# Patient Record
Sex: FEMALE | Race: WHITE
[De-identification: names, ages, dates, MRNs, and addresses within clinical notes are randomized per-mention and may not be internally consistent; named-entity substitution may affect disease eponyms.]

---

## 2017-03-30 ENCOUNTER — APPOINTMENT (OUTPATIENT)
Dept: GYNECOLOGIC ONCOLOGY | Facility: CLINIC | Age: 57
End: 2017-03-30

## 2017-03-30 VITALS
SYSTOLIC BLOOD PRESSURE: 120 MMHG | BODY MASS INDEX: 28.32 KG/M2 | DIASTOLIC BLOOD PRESSURE: 80 MMHG | WEIGHT: 170 LBS | HEIGHT: 65 IN

## 2017-03-30 DIAGNOSIS — M84.40XA PATHOLOGICAL FRACTURE, UNSPECIFIED SITE, INITIAL ENCOUNTER FOR FRACTURE: ICD-10-CM

## 2017-03-30 DIAGNOSIS — C53.9 MALIGNANT NEOPLASM OF CERVIX UTERI, UNSPECIFIED: ICD-10-CM

## 2017-03-30 DIAGNOSIS — C79.9 SECONDARY MALIGNANT NEOPLASM OF UNSPECIFIED SITE: ICD-10-CM

## 2017-03-30 DIAGNOSIS — C79.51 SECONDARY MALIGNANT NEOPLASM OF BONE: ICD-10-CM

## 2017-03-30 DIAGNOSIS — C80.1 SECONDARY MALIGNANT NEOPLASM OF UNSPECIFIED SITE: ICD-10-CM

## 2017-03-30 DIAGNOSIS — N88.8 OTHER SPECIFIED NONINFLAMMATORY DISORDERS OF CERVIX UTERI: ICD-10-CM

## 2017-04-07 ENCOUNTER — APPOINTMENT (OUTPATIENT)
Dept: ORTHOPEDIC SURGERY | Facility: CLINIC | Age: 57
End: 2017-04-07

## 2017-04-14 PROBLEM — M84.40XA PATHOLOGIC FRACTURE: Status: ACTIVE | Noted: 2017-04-14

## 2017-04-14 PROBLEM — C53.9 CERVICAL CANCER, FIGO STAGE IVB: Status: ACTIVE | Noted: 2017-04-14

## 2017-04-14 PROBLEM — C79.51 BONE METASTASIS: Status: ACTIVE | Noted: 2017-04-14

## 2017-04-23 ENCOUNTER — INPATIENT (INPATIENT)
Facility: HOSPITAL | Age: 57
LOS: 3 days | Discharge: HOME HEALTH | End: 2017-04-27
Attending: INTERNAL MEDICINE | Admitting: INTERNAL MEDICINE
Payer: COMMERCIAL

## 2017-04-23 VITALS — WEIGHT: 160.06 LBS

## 2017-04-23 LAB
ALBUMIN SERPL ELPH-MCNC: 2.4 G/DL — LOW (ref 3.3–5)
ALP SERPL-CCNC: 420 U/L — HIGH (ref 40–120)
ALT FLD-CCNC: 84 U/L — HIGH (ref 12–78)
ANION GAP SERPL CALC-SCNC: 13 MMOL/L — SIGNIFICANT CHANGE UP (ref 5–17)
APTT BLD: 32.5 SEC — SIGNIFICANT CHANGE UP (ref 27.5–37.4)
AST SERPL-CCNC: 203 U/L — HIGH (ref 15–37)
BILIRUB SERPL-MCNC: 2.5 MG/DL — HIGH (ref 0.2–1.2)
BLD GP AB SCN SERPL QL: SIGNIFICANT CHANGE UP
BUN SERPL-MCNC: 17 MG/DL — SIGNIFICANT CHANGE UP (ref 7–23)
CALCIUM SERPL-MCNC: 7.3 MG/DL — LOW (ref 8.5–10.1)
CHLORIDE SERPL-SCNC: 90 MMOL/L — LOW (ref 96–108)
CO2 SERPL-SCNC: 24 MMOL/L — SIGNIFICANT CHANGE UP (ref 22–31)
CREAT SERPL-MCNC: 0.73 MG/DL — SIGNIFICANT CHANGE UP (ref 0.5–1.3)
GLUCOSE SERPL-MCNC: 132 MG/DL — HIGH (ref 70–99)
INR BLD: 1.55 RATIO — HIGH (ref 0.88–1.16)
LACTATE SERPL-SCNC: 3.3 MMOL/L — HIGH (ref 0.7–2)
NT-PROBNP SERPL-SCNC: 1122 PG/ML — HIGH (ref 0–125)
POTASSIUM SERPL-MCNC: 3.7 MMOL/L — SIGNIFICANT CHANGE UP (ref 3.5–5.3)
POTASSIUM SERPL-SCNC: 3.7 MMOL/L — SIGNIFICANT CHANGE UP (ref 3.5–5.3)
PROT SERPL-MCNC: 6.7 GM/DL — SIGNIFICANT CHANGE UP (ref 6–8.3)
PROTHROM AB SERPL-ACNC: 16.9 SEC — HIGH (ref 9.8–12.7)
RAPID RVP RESULT: SIGNIFICANT CHANGE UP
SODIUM SERPL-SCNC: 127 MMOL/L — LOW (ref 135–145)
TROPONIN I SERPL-MCNC: <0.015 NG/ML — SIGNIFICANT CHANGE UP (ref 0.01–0.04)
TYPE + AB SCN PNL BLD: SIGNIFICANT CHANGE UP

## 2017-04-23 PROCEDURE — 93010 ELECTROCARDIOGRAM REPORT: CPT

## 2017-04-23 PROCEDURE — 76705 ECHO EXAM OF ABDOMEN: CPT | Mod: 26

## 2017-04-23 PROCEDURE — 99285 EMERGENCY DEPT VISIT HI MDM: CPT

## 2017-04-23 PROCEDURE — 71010: CPT | Mod: 26

## 2017-04-23 RX ORDER — PIPERACILLIN AND TAZOBACTAM 4; .5 G/20ML; G/20ML
3.38 INJECTION, POWDER, LYOPHILIZED, FOR SOLUTION INTRAVENOUS ONCE
Qty: 0 | Refills: 0 | Status: COMPLETED | OUTPATIENT
Start: 2017-04-23 | End: 2017-04-23

## 2017-04-23 RX ORDER — ONDANSETRON 8 MG/1
4 TABLET, FILM COATED ORAL ONCE
Qty: 0 | Refills: 0 | Status: COMPLETED | OUTPATIENT
Start: 2017-04-23 | End: 2017-04-23

## 2017-04-23 RX ORDER — VANCOMYCIN HCL 1 G
1000 VIAL (EA) INTRAVENOUS ONCE
Qty: 0 | Refills: 0 | Status: COMPLETED | OUTPATIENT
Start: 2017-04-23 | End: 2017-04-23

## 2017-04-23 RX ORDER — ACETAMINOPHEN 500 MG
650 TABLET ORAL ONCE
Qty: 0 | Refills: 0 | Status: COMPLETED | OUTPATIENT
Start: 2017-04-23 | End: 2017-04-23

## 2017-04-23 RX ORDER — DIPHENHYDRAMINE HCL 50 MG
52 CAPSULE ORAL ONCE
Qty: 0 | Refills: 0 | Status: DISCONTINUED | OUTPATIENT
Start: 2017-04-23 | End: 2017-04-23

## 2017-04-23 RX ORDER — DIPHENHYDRAMINE HCL 50 MG
50 CAPSULE ORAL ONCE
Qty: 0 | Refills: 0 | Status: COMPLETED | OUTPATIENT
Start: 2017-04-23 | End: 2017-04-23

## 2017-04-23 RX ORDER — SODIUM CHLORIDE 9 MG/ML
2000 INJECTION INTRAMUSCULAR; INTRAVENOUS; SUBCUTANEOUS ONCE
Qty: 0 | Refills: 0 | Status: COMPLETED | OUTPATIENT
Start: 2017-04-23 | End: 2017-04-23

## 2017-04-23 RX ADMIN — SODIUM CHLORIDE 2000 MILLILITER(S): 9 INJECTION INTRAMUSCULAR; INTRAVENOUS; SUBCUTANEOUS at 20:20

## 2017-04-23 RX ADMIN — PIPERACILLIN AND TAZOBACTAM 200 GRAM(S): 4; .5 INJECTION, POWDER, LYOPHILIZED, FOR SOLUTION INTRAVENOUS at 21:42

## 2017-04-23 RX ADMIN — Medication 50 MILLIGRAM(S): at 21:42

## 2017-04-23 RX ADMIN — Medication 250 MILLIGRAM(S): at 22:43

## 2017-04-23 RX ADMIN — Medication 650 MILLIGRAM(S): at 21:42

## 2017-04-23 NOTE — ED PROVIDER NOTE - DETAILS:
I, Maninder Pace, performed the initial face to face bedside interview with this patient regarding history of present illness, review of symptoms and relevant past medical, social and family history.  I completed an independent physical examination.  I was the initial provider who evaluated this patient.  The history, relevant review of systems, past medical and surgical history, medical decision making, and physical examination was documented by the scribe in my presence and I attest to the accuracy of the documentation.

## 2017-04-23 NOTE — ED STATDOCS - DETAILS:
I, Laurie Reed, performed the initial face to face bedside interview with this patient regarding history of present illness, review of symptoms and relevant past medical, social and family history.  I completed an independent physical examination.  I was the initial provider who evaluated this patient.  The history, relevant review of systems, past medical and surgical history, medical decision making, and physical examination was documented by the scribe in my presence and I attest to the accuracy of the documentation.

## 2017-04-23 NOTE — ED PROVIDER NOTE - CONSTITUTIONAL, MLM
normal... +Diaphoretic. Well appearing, well nourished, awake, alert, oriented to person, place, time/situation and in no apparent distress.

## 2017-04-23 NOTE — ED PROVIDER NOTE - NS ED MD SCRIBE ATTENDING SCRIBE SECTIONS
RESULTS/DISPOSITION/PAST MEDICAL/SURGICAL/SOCIAL HISTORY/HISTORY OF PRESENT ILLNESS/REVIEW OF SYSTEMS/PHYSICAL EXAM/PROGRESS NOTE

## 2017-04-23 NOTE — ED STATDOCS - PROGRESS NOTE DETAILS
57 y/o F PMHx PMD Dr. Ochoa, Onco Dr. Coombs cervical ca with mets, presents to the ED c/o abd pain. Pt's family provides that the pt has been having upper abd pain, decreased intake p/o, increased weakness, and SOB. +BM today +N. Further eval in Main ED. Please note that orders have been placed by Dr. Reed

## 2017-04-23 NOTE — ED PROVIDER NOTE - PROGRESS NOTE DETAILS
CT results reviewed and case DW Dr. Conway (sx) who will see and evaluate pt. Dr Conway had seen pt, NGT ordered and pt will be followed by Sx but admitted to the medicine service. Case DW Dr. Coleman and pt will be admitted to hostpitalist.

## 2017-04-23 NOTE — ED PROVIDER NOTE - CARE PLAN
Principal Discharge DX:	Sepsis  Secondary Diagnosis:	Cervical ca  Secondary Diagnosis:	Partial small bowel obstruction

## 2017-04-23 NOTE — ED ADULT NURSE NOTE - OBJECTIVE STATEMENT
pt brought to Intake 4, A&Ox3, skin w/d/i, PMHx cervical ca with mets, port noted to R chest wall, presents to the ED c/o abd pain, having upper abd pain, decreased intake p/o, increased weakness, and SOB. +BM today described as normal, + nausea w/ no episodes of emesis, found to be 93%SPO2 on arrival, placed on NC 3L/min, Sl placed, labs sent, fluid as per orders, report to Pattie in main 6.

## 2017-04-23 NOTE — ED PROVIDER NOTE - OBJECTIVE STATEMENT
55 y/o F PMHx cervical ca with mets presents to the ED c/o abd pain. Pt's family provides that the pt has been having upper abd pain, n/v/d, decrease PO intake, and increased weakness. Currently pt has no other complaints and denies CP, fever, and chills. PMD= Don. Oncologist= Corin.

## 2017-04-24 DIAGNOSIS — R16.0 HEPATOMEGALY, NOT ELSEWHERE CLASSIFIED: ICD-10-CM

## 2017-04-24 DIAGNOSIS — Z98.890 OTHER SPECIFIED POSTPROCEDURAL STATES: Chronic | ICD-10-CM

## 2017-04-24 DIAGNOSIS — C53.1 MALIGNANT NEOPLASM OF EXOCERVIX: ICD-10-CM

## 2017-04-24 DIAGNOSIS — K56.69 OTHER INTESTINAL OBSTRUCTION: ICD-10-CM

## 2017-04-24 LAB
APPEARANCE UR: (no result)
BACTERIA # UR AUTO: (no result)
BILIRUB UR-MCNC: (no result)
COLOR SPEC: (no result)
COMMENT - URINE: SIGNIFICANT CHANGE UP
DIFF PNL FLD: (no result)
EPI CELLS # UR: SIGNIFICANT CHANGE UP
GLUCOSE UR QL: NEGATIVE MG/DL — SIGNIFICANT CHANGE UP
KETONES UR-MCNC: (no result)
LACTATE SERPL-SCNC: 2 MMOL/L — SIGNIFICANT CHANGE UP (ref 0.7–2)
LEUKOCYTE ESTERASE UR-ACNC: (no result)
NITRITE UR-MCNC: NEGATIVE — SIGNIFICANT CHANGE UP
PH UR: 5 — SIGNIFICANT CHANGE UP (ref 5–8)
PROT UR-MCNC: 30 MG/DL
RBC CASTS # UR COMP ASSIST: (no result) /HPF (ref 0–4)
SP GR SPEC: 1.01 — SIGNIFICANT CHANGE UP (ref 1.01–1.02)
TROPONIN I SERPL-MCNC: <0.015 NG/ML — SIGNIFICANT CHANGE UP (ref 0.01–0.04)
TROPONIN I SERPL-MCNC: <0.015 NG/ML — SIGNIFICANT CHANGE UP (ref 0.01–0.04)
UROBILINOGEN FLD QL: 8 MG/DL
WBC UR QL: (no result)

## 2017-04-24 PROCEDURE — 74183 MRI ABD W/O CNTR FLWD CNTR: CPT | Mod: 26

## 2017-04-24 PROCEDURE — 74176 CT ABD & PELVIS W/O CONTRAST: CPT | Mod: 26

## 2017-04-24 PROCEDURE — 74022 RADEX COMPL AQT ABD SERIES: CPT | Mod: 26

## 2017-04-24 RX ORDER — SODIUM CHLORIDE 9 MG/ML
1000 INJECTION, SOLUTION INTRAVENOUS
Qty: 0 | Refills: 0 | Status: DISCONTINUED | OUTPATIENT
Start: 2017-04-24 | End: 2017-04-26

## 2017-04-24 RX ORDER — ACETAMINOPHEN 500 MG
650 TABLET ORAL EVERY 6 HOURS
Qty: 0 | Refills: 0 | Status: DISCONTINUED | OUTPATIENT
Start: 2017-04-24 | End: 2017-04-27

## 2017-04-24 RX ORDER — PIPERACILLIN AND TAZOBACTAM 4; .5 G/20ML; G/20ML
3.38 INJECTION, POWDER, LYOPHILIZED, FOR SOLUTION INTRAVENOUS EVERY 8 HOURS
Qty: 0 | Refills: 0 | Status: DISCONTINUED | OUTPATIENT
Start: 2017-04-24 | End: 2017-04-25

## 2017-04-24 RX ORDER — MORPHINE SULFATE 50 MG/1
2 CAPSULE, EXTENDED RELEASE ORAL EVERY 4 HOURS
Qty: 0 | Refills: 0 | Status: DISCONTINUED | OUTPATIENT
Start: 2017-04-24 | End: 2017-04-27

## 2017-04-24 RX ORDER — ACETAMINOPHEN 500 MG
650 TABLET ORAL ONCE
Qty: 0 | Refills: 0 | Status: COMPLETED | OUTPATIENT
Start: 2017-04-24 | End: 2017-04-24

## 2017-04-24 RX ORDER — ONDANSETRON 8 MG/1
4 TABLET, FILM COATED ORAL EVERY 4 HOURS
Qty: 0 | Refills: 0 | Status: DISCONTINUED | OUTPATIENT
Start: 2017-04-24 | End: 2017-04-24

## 2017-04-24 RX ORDER — SODIUM CHLORIDE 9 MG/ML
1000 INJECTION INTRAMUSCULAR; INTRAVENOUS; SUBCUTANEOUS ONCE
Qty: 0 | Refills: 0 | Status: COMPLETED | OUTPATIENT
Start: 2017-04-24 | End: 2017-04-24

## 2017-04-24 RX ORDER — DEXAMETHASONE 0.5 MG/5ML
4 ELIXIR ORAL DAILY
Qty: 0 | Refills: 0 | Status: DISCONTINUED | OUTPATIENT
Start: 2017-04-24 | End: 2017-04-27

## 2017-04-24 RX ORDER — ONDANSETRON 8 MG/1
4 TABLET, FILM COATED ORAL EVERY 4 HOURS
Qty: 0 | Refills: 0 | Status: DISCONTINUED | OUTPATIENT
Start: 2017-04-24 | End: 2017-04-27

## 2017-04-24 RX ORDER — MORPHINE SULFATE 50 MG/1
4 CAPSULE, EXTENDED RELEASE ORAL EVERY 4 HOURS
Qty: 0 | Refills: 0 | Status: DISCONTINUED | OUTPATIENT
Start: 2017-04-24 | End: 2017-04-27

## 2017-04-24 RX ADMIN — ONDANSETRON 4 MILLIGRAM(S): 8 TABLET, FILM COATED ORAL at 02:00

## 2017-04-24 RX ADMIN — Medication 4 MILLIGRAM(S): at 19:02

## 2017-04-24 RX ADMIN — ONDANSETRON 4 MILLIGRAM(S): 8 TABLET, FILM COATED ORAL at 19:48

## 2017-04-24 RX ADMIN — SODIUM CHLORIDE 75 MILLILITER(S): 9 INJECTION, SOLUTION INTRAVENOUS at 09:28

## 2017-04-24 RX ADMIN — PIPERACILLIN AND TAZOBACTAM 25 GRAM(S): 4; .5 INJECTION, POWDER, LYOPHILIZED, FOR SOLUTION INTRAVENOUS at 09:32

## 2017-04-24 RX ADMIN — SODIUM CHLORIDE 1000 MILLILITER(S): 9 INJECTION INTRAMUSCULAR; INTRAVENOUS; SUBCUTANEOUS at 05:38

## 2017-04-24 RX ADMIN — PIPERACILLIN AND TAZOBACTAM 25 GRAM(S): 4; .5 INJECTION, POWDER, LYOPHILIZED, FOR SOLUTION INTRAVENOUS at 16:40

## 2017-04-24 RX ADMIN — MORPHINE SULFATE 4 MILLIGRAM(S): 50 CAPSULE, EXTENDED RELEASE ORAL at 15:17

## 2017-04-24 RX ADMIN — MORPHINE SULFATE 4 MILLIGRAM(S): 50 CAPSULE, EXTENDED RELEASE ORAL at 15:30

## 2017-04-24 RX ADMIN — ONDANSETRON 4 MILLIGRAM(S): 8 TABLET, FILM COATED ORAL at 13:20

## 2017-04-24 RX ADMIN — PIPERACILLIN AND TAZOBACTAM 25 GRAM(S): 4; .5 INJECTION, POWDER, LYOPHILIZED, FOR SOLUTION INTRAVENOUS at 23:12

## 2017-04-24 RX ADMIN — Medication 650 MILLIGRAM(S): at 05:04

## 2017-04-24 NOTE — CONSULT NOTE ADULT - PROBLEM SELECTOR RECOMMENDATION 9
with likely disease progression in the bones and possibly in the left lobe of the liver  chemo options are limited  pemetrexed or off label use of immunotherapy can be considered if she improves.

## 2017-04-24 NOTE — CONSULT NOTE ADULT - ASSESSMENT
55 y/o female with h/o metastatic cervical cancer with extensive bone mets (diagnosed in 8/16) s/p surgery for spinal stabilization s/p XRT and chemotherapy (cisplatin). She has a large cervical/uterine mass obstructing the ureters. She was admitted on 4/23 for worsening abdominal pain, nausea and vomiting. Workup showed SBO and fever ro 10.7F. She was given zosyn IV.    1. Febrile syndrome. ?cause ?sepsis ?tumor fever. cervical Ca with extensive bone mets and likely liver mets.  -obtain BC x 2  -agree with zosyn 3.375 gm IV q8h for now  -reason for abx use and side effects reviewed with patient; monitor BMP  -monitor temps  -f/u CBC  -prognosis is poor  -supportive care  2. Other issues: care per medicine and oncology

## 2017-04-24 NOTE — CONSULT NOTE ADULT - SUBJECTIVE AND OBJECTIVE BOX
Patient is a 56y old  Female who presents with a chief complaint of abdominal pain, N/V    HPI:  55 y/o female with h/o metastatic cervical cancer with extensive bone mets (diagnosed in 8/16) s/p surgery for spinal stabilization s/p XRT and chemotherapy (cisplatin). She has a large cervical/uterine mass obstructing the ureters. She was admitted on 4/23 for worsening abdominal pain, nausea and vomiting. Workup showed SBO and fever ro 10.7F. She was given zosyn IV.      PAST MEDICAL & SURGICAL HISTORY:  cervical cancer as above with bone mets  lumbar spine stabilization for pathological fracture  mediport  Hernia repair  abdominal wall defect, congenital.  Meds: per reconciliation sheet, noted below  MEDICATIONS  (STANDING):  piperacillin/tazobactam IVPB. 3.375Gram(s) IV Intermittent every 8 hours  dextrose 5% + sodium chloride 0.45%. 1000milliLiter(s) IV Continuous <Continuous>    MEDICATIONS  (PRN):  morphine  - Injectable 2milliGRAM(s) IV Push every 4 hours PRN Moderate Pain (4 - 6)  morphine  - Injectable 4milliGRAM(s) IV Push every 4 hours PRN Severe Pain (7 - 10)  ondansetron    Tablet 4milliGRAM(s) Oral every 4 hours PRN Nausea and/or Vomiting  acetaminophen   Tablet 650milliGRAM(s) Oral every 6 hours PRN For Temp greater than 38 C (100.4 F)  acetaminophen   Tablet. 650milliGRAM(s) Oral every 6 hours PRN Mild Pain (1 - 3)    Allergies    No Known Allergies    Intolerances      Social: no smoking, no alcohol, no illegal drugs; no recent travel, no exposure to TB  FAMILY HISTORY:    ROS: the patient denies fever, no chills, no HA, no dizziness, no sore throat, no blurry vision, no CP, no palpitations, no SOB, no cough, no abdominal pain, no diarrhea, no N/V, no dysuria, no leg pain, no claudication, no rash, no joint aches, no rectal pain or bleeding, no night sweats    Vital Signs Last 24 Hrs  T(C): 36.4, Max: 38.7 (04-23 @ 21:00)  T(F): 97.6, Max: 101.7 (04-23 @ 21:00)  HR: 86 (86 - 117)  BP: 104/61 (104/61 - 123/72)  BP(mean): --  RR: 20 (16 - 20)  SpO2: 98% (93% - 100%)  Daily     Daily     PE:    Constitutional: frail looking  HEENT: NC/AT, EOMI, PERRLA  Neck: supple  Back: no tenderness  Respiratory: clear  Cardiovascular: S1S2 regular, no murmurs  Abdomen: soft, not tender, not distended, positive BS  Genitourinary: deferred  Rectal: deferred  Musculoskeletal: no muscle tenderness, no joint swelling or tenderness  Extremities: no pedal edema  Neurological: AxOx3, moving all extremities, no focal deficits  Skin: no rashes    Labs:                        11.4   10.9  )-----------( 160      ( 23 Apr 2017 21:13 )             32.9     04-23    127<L>  |  90<L>  |  17  ----------------------------<  132<H>  3.7   |  24  |  0.73    Ca    7.3<L>      23 Apr 2017 20:13    TPro  6.7  /  Alb  2.4<L>  /  TBili  2.5<H>  /  DBili  x   /  AST  203<H>  /  ALT  84<H>  /  AlkPhos  420<H>  04-23     LIVER FUNCTIONS - ( 23 Apr 2017 20:13 )  Alb: 2.4 g/dL / Pro: 6.7 gm/dL / ALK PHOS: 420 U/L / ALT: 84 U/L / AST: 203 U/L / GGT: x                 Radiology:    CT abdomen and pelvis:  Stomach and proximal small bowel loops are decompressed.  There are   dilated loops of mid small bowel measuring up to 4.5 cm multiple   air-fluid levels.  The distal small bowel is collapsed.  The colon is   underdistended.  Findings are suspicious for an early or low-grade bowel   obstruction.  Discrete transition point is not visualized.  The patient   would likely benefit from nasogastric tube placement.  If symptoms   persist or worsen, a follow-up CT with oral contrast can be performed.    There are two ventral abdominal wall hernias with nonobstructed distal   small bowel loop seen inside the rectus sheath; this does not contribute   to the patient's bowel obstruction.       Interval progression of masslike hypertrophy of the left hepatic lobe   which now measures 9 x 19 x 19 cm compared to 8 x14 x 16.5 cm   08/02/2016.  Underlying cirrhosis should be considered.  MRI can be   performed to exclude hepatocellular carcinoma.  Liver biopsy can be   performed as clinically warranted.    Progression of osseous metastatic disease throughout the visualized   spine, ribs and bony pelvis since 08/02/2014.    Advanced directives addressed: full resuscitation Patient is a 56y old  Female who presents with a chief complaint of abdominal pain, N/V    HPI:  55 y/o female with h/o omphalocele, metastatic cervical cancer with extensive bone mets (diagnosed in 8/16) s/p surgery for spinal stabilization s/p XRT and chemotherapy (cisplatin). She has a large cervical/uterine mass obstructing the ureters. She was admitted on 4/23 for worsening abdominal pain, nausea and vomiting. Workup showed SBO and fever ro 10.7F. She was given zosyn IV.      PAST MEDICAL & SURGICAL HISTORY:  cervical cancer as above with bone mets  lumbar spine stabilization for pathological fracture  mediport  Hernia repair  abdominal wall defect, congenital.  Meds: per reconciliation sheet, noted below  MEDICATIONS  (STANDING):  piperacillin/tazobactam IVPB. 3.375Gram(s) IV Intermittent every 8 hours  dextrose 5% + sodium chloride 0.45%. 1000milliLiter(s) IV Continuous <Continuous>    MEDICATIONS  (PRN):  morphine  - Injectable 2milliGRAM(s) IV Push every 4 hours PRN Moderate Pain (4 - 6)  morphine  - Injectable 4milliGRAM(s) IV Push every 4 hours PRN Severe Pain (7 - 10)  ondansetron    Tablet 4milliGRAM(s) Oral every 4 hours PRN Nausea and/or Vomiting  acetaminophen   Tablet 650milliGRAM(s) Oral every 6 hours PRN For Temp greater than 38 C (100.4 F)  acetaminophen   Tablet. 650milliGRAM(s) Oral every 6 hours PRN Mild Pain (1 - 3)    Allergies    No Known Allergies    Intolerances      Social: no smoking, no alcohol, no illegal drugs; no recent travel, no exposure to TB  FAMILY HISTORY:    ROS: the patient denies fever, no chills, no HA, no dizziness, no sore throat, no blurry vision, no CP, no palpitations, no SOB, no cough, no abdominal pain, no diarrhea, no N/V, no dysuria, no leg pain, no claudication, no rash, no joint aches, no rectal pain or bleeding, no night sweats    Vital Signs Last 24 Hrs  T(C): 36.4, Max: 38.7 (04-23 @ 21:00)  T(F): 97.6, Max: 101.7 (04-23 @ 21:00)  HR: 86 (86 - 117)  BP: 104/61 (104/61 - 123/72)  BP(mean): --  RR: 20 (16 - 20)  SpO2: 98% (93% - 100%)  Daily     Daily     PE:    Constitutional: frail looking  HEENT: NC/AT, EOMI, PERRLA  Neck: supple  Back: no tenderness  Respiratory: clear  Cardiovascular: S1S2 regular, no murmurs  Abdomen: soft, not tender, not distended, positive BS  Genitourinary: deferred  Rectal: deferred  Musculoskeletal: no muscle tenderness, no joint swelling or tenderness  Extremities: no pedal edema  Neurological: AxOx3, moving all extremities, no focal deficits  Skin: no rashes    Labs:                        11.4   10.9  )-----------( 160      ( 23 Apr 2017 21:13 )             32.9     04-23    127<L>  |  90<L>  |  17  ----------------------------<  132<H>  3.7   |  24  |  0.73    Ca    7.3<L>      23 Apr 2017 20:13    TPro  6.7  /  Alb  2.4<L>  /  TBili  2.5<H>  /  DBili  x   /  AST  203<H>  /  ALT  84<H>  /  AlkPhos  420<H>  04-23     LIVER FUNCTIONS - ( 23 Apr 2017 20:13 )  Alb: 2.4 g/dL / Pro: 6.7 gm/dL / ALK PHOS: 420 U/L / ALT: 84 U/L / AST: 203 U/L / GGT: x                 Radiology:    CT abdomen and pelvis:  Stomach and proximal small bowel loops are decompressed.  There are   dilated loops of mid small bowel measuring up to 4.5 cm multiple   air-fluid levels.  The distal small bowel is collapsed.  The colon is   underdistended.  Findings are suspicious for an early or low-grade bowel   obstruction.  Discrete transition point is not visualized.  The patient   would likely benefit from nasogastric tube placement.  If symptoms   persist or worsen, a follow-up CT with oral contrast can be performed.    There are two ventral abdominal wall hernias with nonobstructed distal   small bowel loop seen inside the rectus sheath; this does not contribute   to the patient's bowel obstruction.       Interval progression of masslike hypertrophy of the left hepatic lobe   which now measures 9 x 19 x 19 cm compared to 8 x14 x 16.5 cm   08/02/2016.  Underlying cirrhosis should be considered.  MRI can be   performed to exclude hepatocellular carcinoma.  Liver biopsy can be   performed as clinically warranted.    Progression of osseous metastatic disease throughout the visualized   spine, ribs and bony pelvis since 08/02/2014.    Advanced directives addressed: full resuscitation

## 2017-04-24 NOTE — CONSULT NOTE ADULT - ASSESSMENT
56 y old female with cervical CA, S/p chemoradiation, with abdominal pain, nausea vomiting, fever, passing gas, had a BM in ER, Ct evidence of  possible early SBO    IV hydration  serial abdominal exam  NGT  fever work up, medical evaluation  DVT /GI prophylaxis  Oncological evaluation  Pt is known to DR. Alfaro will inform him in AM

## 2017-04-24 NOTE — PATIENT PROFILE ADULT. - VISION (WITH CORRECTIVE LENSES IF THE PATIENT USUALLY WEARS THEM):
for distance/Partially impaired: cannot see medication labels or newsprint, but can see obstacles in path, and the surrounding layout; can count fingers at arm's length

## 2017-04-24 NOTE — CHART NOTE - NSCHARTNOTEFT_GEN_A_CORE
Hospitalist Update Note    Case discussed via phone with Dr. Huang regarding patient. Patient recently seen outpatient in her office to discuss progression of disease. Per Gyn Onc surgeon, there is no surgical plans from her standpoint. Case will be managed by medical oncology and general surgery with regards to SBO. Any potential surgery by general surgery would be moreso palliative given extensive disease.     - Will continue care via NGT and serial abdominal exams with Dr. Gonzalez on board.   - Palliative care discussions for GOC underway.

## 2017-04-24 NOTE — CONSULT NOTE ADULT - SUBJECTIVE AND OBJECTIVE BOX
Patient is a 56y old  Female who presents with a chief complaint of abdominal pain,fever    HPI: 67 Y old female with H/O cervical CA, S/P chemoradiation last in January 2017, has chronic abdominal pain, off a on worse since yesterday, diffuse associated with nausea and vomiting. febrile in the ER, no cough , congestion, no urinary complaint had a loose BM in ER, passing gas.    ROS: as above    PAST MEDICAL & SURGICAL HISTORY:  Congenital abdominal wall defect, S/P surgeries  Cervical CA, chemo radiation  Thoracic spine surgery    Allergies    No Known Allergies    FH: NC  Social H: Quit smoking in August 2016, Drinks weekly.    Medication Zofran, laxative    Vital Signs Last 24 Hrs  T(C): 36.6, Max: 38.7 (04-23 @ 21:00)  T(F): 97.9, Max: 101.7 (04-23 @ 21:00)  HR: 98 (98 - 117)  BP: 120/76 (111/68 - 123/72)  BP(mean): --  RR: 17 (16 - 18)  SpO2: 100% (93% - 100%)      PHYSICAL EXAM:      Constitutional: NAD    General:  AOx3    Respiratory:  CTABL    Cardiovascular: S1+S2+0    Gastrointestinal : Abdomen soft,  distended, Mid and left  abdominal tenderness no rebound or guarding, palpable liver. healed mid line scars.      Extremities: NV intact    Neurological: No focal deficit.    Labs:                          11.4   10.9  )-----------( 160      ( 23 Apr 2017 21:13 )             32.9       04-23    127<L>  |  90<L>  |  17  ----------------------------<  132<H>  3.7   |  24  |  0.73    Ca    7.3<L>      23 Apr 2017 20:13    TPro  6.7  /  Alb  2.4<L>  /  TBili  2.5<H>  /  DBili  x   /  AST  203<H>  /  ALT  84<H>  /  AlkPhos  420<H>  04-23      PT/INR - ( 23 Apr 2017 20:13 )   PT: 16.9 sec;   INR: 1.55 ratio         PTT - ( 23 Apr 2017 20:13 )  PTT:32.5 sec    S lactate: 3.3-2    IMPRESSION:  Stomach and proximal small bowel loops are decompressed.  There are   dilated loops of mid small bowel measuring up to 4.5 cm multiple   air-fluid levels.  The distal small bowel is collapsed.  The colon is   underdistended.  Findings are suspicious for an early or low-grade bowel   obstruction.  Discrete transition point is not visualized.  The patient   would likely benefit from nasogastric tube placement.  If symptoms   persist or worsen, a follow-up CT with oral contrast can be performed.    There are two ventral abdominal wall hernias with nonobstructed distal   small bowel loop seen inside the rectus sheath; this does not contribute   to the patient's bowel obstruction.       Interval progression of masslike hypertrophy of the left hepatic lobe   which now measures 9 x 19 x 19 cm compared to 8 x14 x 16.5 cm   08/02/2016.  Underlying cirrhosis should be considered.  MRI can be   performed to exclude hepatocellular carcinoma.  Liver biopsy can be   performed as clinically warranted.    Progression of osseous metastatic disease throughout the visualized   spine, ribs and bony pelvis since 08/02/2014.

## 2017-04-24 NOTE — CONSULT NOTE ADULT - SUBJECTIVE AND OBJECTIVE BOX
Patient is a 56y old  Female who presents with a chief complaint of abdominal pain nausea and vomiting with constipation    HPI:  56 yr old female who was diagnosed with metastatic  cervical cancer with extensive bone mets in 8/16. she required surgery for spinal stabilization and the path was squamous cell ca with positive p16 consistent with cervical cancer. subsequently a large cervical/uterine mass obstructing the ureters was also noted.    she received RT to her spine and also received chemotherapy with 6 cycles of cisplatin and taxol with radiation therapy to her pelvis.    last bone scan in 2/16 showed widespread bone mets but the patient was asymptomatic and no obvious destructive lesions were seen and she is currently not on chemotherapy but is receiving xgeva for bone stabilization. and enlarged left lobe of liver with normal LFTs were also noted    she is awaiting a knee replacement for severe OA.    now presents with abdominal pain, Vomiting and no BM for 1 week    found to have SBO , abnormal LFTs and further enlargement of the left lobe of the liver.        PAST MEDICAL & SURGICAL HISTORY:  cervical cancer as above with bone mets  lumbar spine stabilization for pathological fracture  mediport  Hernia repair  abdominal wall defect, congenital.    REVIEW OF SYSTEMS    General:	The patient feels unwell  no unexpected weight loss. Appetite poor . no night sweats.  Skin: no Rash.	  ENT: no sore throat or oral pain. no difficulty with swallowing  Respiratory: No chest pain, No shortness of breath, no hemoptysis, no cough, no chest pain.  Cardiovascular : No chest pain. no palpitation.  Gastrointestinal: abdominal pain, vomiting, constipation.  Genitourinary: No hematuria , no dysuria	  Musculoskeletal: No myalgia, no arthralgia, no back pain, no joint swelling  Neurological: no headaches, no dizzyness, no weakness, no double vision. 	  Psychiatric: no change in mood. 	  Hematology/Lymphatics: no weakness. 	  Endocrine:	no burning in urine or frequency  Allergic/Immunologic:	 no fever.     Allergies    No Known Allergies    Intolerances        Occupation:  [employer]:+ Smoking: Denied Drug Use: Denied  Marital Status:         FAMILY HISTORY:      MEDICATIONS  (STANDING):  piperacillin/tazobactam IVPB. 3.375Gram(s) IV Intermittent every 8 hours  dextrose 5% + sodium chloride 0.45%. 1000milliLiter(s) IV Continuous <Continuous>    MEDICATIONS  (PRN):  morphine  - Injectable 2milliGRAM(s) IV Push every 4 hours PRN Moderate Pain (4 - 6)  morphine  - Injectable 4milliGRAM(s) IV Push every 4 hours PRN Severe Pain (7 - 10)  ondansetron    Tablet 4milliGRAM(s) Oral every 4 hours PRN Nausea and/or Vomiting  acetaminophen   Tablet 650milliGRAM(s) Oral every 6 hours PRN For Temp greater than 38 C (100.4 F)  acetaminophen   Tablet. 650milliGRAM(s) Oral every 6 hours PRN Mild Pain (1 - 3)      PHYSICAL EXAM:  Vital Signs Last 24 Hrs  T(C): 36.4, Max: 38.7 (04-23 @ 21:00)  T(F): 97.6, Max: 101.7 (04-23 @ 21:00)  HR: 86 (86 - 117)  BP: 104/61 (104/61 - 123/72)  BP(mean): --  RR: 20 (16 - 20)  SpO2: 98% (93% - 100%)      Gen: appears uncomfortable with NGT in place.  HEENT: normocephalic/atraumatic, no conjunctival pallor, no scleral icterus, no oral thrush/mucosal bleeding/mucositis  Neck: supple, no masses, no JVD  Breasts: deferred  Back: nontender  Cardiovascular: heart sounds Normal S1S2, no murmurs/rubs/gallops  Respiratory: air entry normal and equal on both sides. no wheeze, no ronchi,   Gastrointestinal: large liver palpable to below the umbilicaus. no evidence for ascites. BS++  Genitourinary: deferred  Rectal: deferred  Extremities: no clubbing/cyanosis, no edema, no calf tenderness  Neurological:  Alert oriented X3 cranial nerves normal. no focal deficits  Skin: no rash on visible skin  Lymph Nodes:  no cervical/supraclavicular LAD, no axillary/groin LAD  Musculoskeletal:  full ROM  Psychiatric:  mood appears normal. anxious.        LABS:                        11.4   10.9  )-----------( 160      ( 23 Apr 2017 21:13 )             32.9     23 Apr 2017 20:13    127    |  90     |  17     ----------------------------<  132    3.7     |  24     |  0.73     Ca    7.3        23 Apr 2017 20:13    TPro  6.7    /  Alb  2.4    /  TBili  2.5    /  DBili  x      /  AST  203    /  ALT  84     /  AlkPhos  420    23 Apr 2017 20:13    LIVER FUNCTIONS - ( 23 Apr 2017 20:13 )  Alb: 2.4 g/dL / Pro: 6.7 gm/dL / ALK PHOS: 420 U/L / ALT: 84 U/L / AST: 203 U/L / GGT: x           PT/INR - ( 23 Apr 2017 20:13 )   PT: 16.9 sec;   INR: 1.55 ratio         PTT - ( 23 Apr 2017 20:13 )  PTT:32.5 sec    RADIOLOGY & ADDITIONAL STUDIES:    FINDINGS: The examination is limited by respiratory motion artifact and lack of IV/oral contrast. Visualized lower chest: Unremarkable. Liver: Interval progression of masslike hypertrophy of the left hepatic lobe which measures up to 9 x 19 x 19 cm compared to 8 x 14 x 16.5 cm on 08/02/2016. Bile ducts: Common bile duct not visualized. Gallbladder: Poorly visualized. Spleen: Unremarkable. Pancreas: Partially obscured by motion artifact. Grossly unremarkable. Adrenal glands: Unremarkable. Kidneys/ureters: No hydronephrosis, renal stones or perinephric infiltration Bladder: Unremarkable. Reproductive organs: Multilobulated appearance of the uterus compatible with multiple fibroids is unchanged in appearance from 08/02/2016. Bowel: Stomach and proximal small bowel loops are decompressed. There are dilated loops of mid small bowel measuring up to 4.5 cm with multiple air-fluid levels. Distal small bowel is collapsed. Colon is underdistended. Findings are suspicious for an early or low-grade bowel obstruction. Normal appendix. Peritoneum: No ascites, free air or abscess. Retroperitoneum: No lymphadenopathy. Vasculature: After cirrhotic calcifications of the aortoiliac tree. Abdominal wall/soft tissues: There are two ventral abdominal wall hernias with nonobstructed distal small bowel loops seen in the rectus sheath. Bones: Since 08/02/2014 there is been marked progression of osseous metastatic disease throughout the visualized spine, ribs and bony pelvis. The patient is status post T9-L4 posterior spinal fusion with raquel screw construct as well as T1 corpectomy with placement of a strut graft. Patient. IMPRESSION: Stomach and proximal small bowel loops are decompressed. There are dilated loops of mid small bowel measuring up to 4.5 cm multiple air-fluid levels. The distal small bowel is collapsed. The colon is underdistended. Findings are suspicious for an early or low-grade bowel obstruction. Discrete transition point is not visualized. The patient would likely benefit from nasogastric tube placement. If symptoms persist or worsen, a follow-up CT with oral contrast can be performed. There are two ventral abdominal wall hernias with nonobstructed distal small bowel loop seen inside the rectus sheath; this does not contribute to the patient's bowel obstruction. Interval progression of masslike hypertrophy of the left hepatic lobe which now measures 9 x 19 x 19 cm compared to 8 x 14 x 16.5 cm 08/02/2016. Underlying cirrhosis should be considered. MRI can be performed to exclude hepatocellular carcinoma. Liver biopsy can be performed as clinically warranted. Progression of osseous metastatic disease throughout the visualized spine, ribs and bony pelvis since 08/02/2014.

## 2017-04-24 NOTE — CONSULT NOTE ADULT - SUBJECTIVE AND OBJECTIVE BOX
HPI: Ms. Burnett is a 56 yr old female w/ PMHx extensive abd surgery as a child (?omphaloceole), severe knee OA (awaiting replacement) recently diagnosed with metastatic cervical cancer with extensive bone mets in 8/16, after surgery for spinal stabilization, subsequently found to have a large cervical/uterine mass obstructing the ureters. Now s/p RT to spine, chemotherapy with 6 cycles of cisplatin and taxol + RT to pelvis. Of note, last bone scan in 2/16 showed widespread bone mets but asx,  and enlarged left lobe of liver with normal LFTs were also noted. No longer on chemo but is receiving xgeva for bone stabilization. Now admitted on 4/24 for abdominal pain, vomiting and no BM x1 week. CT abd pelvis c/w early low grade SBO (s/p NGT placement), enlarging liver mass, and worsening bony mets, now with abnormal LFTs. Pt with likely poor prognosis. Palliative care consulted to help with establishing GOC.    Met Ms. Burnett this afternoon with Palliative Care DONTA Navarro, father Julio was just stepping out. Pt denied pain. Had some nausea prior to our encounter resolved s/p zofran. Also notes some difficulty getting urine out, denies incontinence of urine or stool. She has no other physical complaints.    Attempted to gather what pt knows about her current medical condition. She noted wanting the NGT out, but understanding why it had to be kept in after explanation. Pt seemed to have some very mild cognitive decline. Saying she came for not feeling well. it took quite a bit of questioning to get her to mention that she had cancer and she was unable to recall details of this. She noted she also has knee pain that she took pain medication for at home, but could not recall what the medication was, saying that her sister usually handles her medications. When asked she noted that she would want to know about what is going on with her, though agreeing with our recommendation to have family around for support just in case any of this information was upsetting. She shared that she lives with her father, she lives upstairs and her sister lives downstairs. 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     We agreed to return when she came back from MRI, however, timing for this was delayed. Thus pt's father plans to touch base with sister and get in touch with pall SW cc: meeting time for tomorrow, likely afternoon.        PAIN: ( )Yes   (x )No  Level:  Location:  Intensity:    /10  Quality:  Aggravating Factors:  Alleviating Factors:  Radiation:  Duration/Timing:  Impact on ADLs:    DYSPNEA: ( ) Yes  (x ) No  Level:    PAST MEDICAL & SURGICAL HISTORY:  cervical cancer as above with bone mets  lumbar spine stabilization for pathological fracture  mediport  Hernia repair  abdominal wall defect, congenital.  Omphacele      SOCIAL HX: Lives with father and sister    Hx opiate tolerance ( )YES  (x )NO    Baseline ADLs  (Prior to Admission)  (x ) Independent   ( )Dependent    FAMILY HISTORY: unknown      Review of Systems:    Anxiety- denies  Physical Discomfort- mild  Constipation- denies, last bm in ED no issues  Diarrhea- denies  Nausea- yes  Vomiting- yes  Anorexia- yes  Weight Loss-   Cough/Secretions- no  Fatigue- yes  Weakness- yes    All other systems reviewed and negative        PHYSICAL EXAM:    Vital Signs Last 24 Hrs  T(C): 36.2, Max: 38.7 (04-23 @ 21:00)  T(F): 97.2, Max: 101.7 (04-23 @ 21:00)  HR: 92 (86 - 117)  BP: 108/62 (104/61 - 123/72)  BP(mean): --  RR: 18 (16 - 20)  SpO2: 98% (93% - 100%)  Daily     Daily     PPSV2: 40  %  FAST:    General: Middle aged female, sitting up in chair, NAD, flat affect, pleasant  Mental Status: aOx3  HEENT: NGT in place to wall suction with dark brown fluid, perrl  Lungs: dec at bases bl  Cardiac: +s1 s2 rrr  GI: +bs, soft, nt, healed surgical scar, palpable liver below costal angle  Ext: scant pedal edema  Neuro: seems to have some slowed mentation though answers questions mostly appropriately, otherwise no focal deficits      LABS:                        11.4   10.9  )-----------( 160      ( 23 Apr 2017 21:13 )             32.9     04-23    127<L>  |  90<L>  |  17  ----------------------------<  132<H>  3.7   |  24  |  0.73    Ca    7.3<L>      23 Apr 2017 20:13    TPro  6.7  /  Alb  2.4<L>  /  TBili  2.5<H>  /  DBili  x   /  AST  203<H>  /  ALT  84<H>  /  AlkPhos  420<H>  04-23    PT/INR - ( 23 Apr 2017 20:13 )   PT: 16.9 sec;   INR: 1.55 ratio         PTT - ( 23 Apr 2017 20:13 )  PTT:32.5 sec  Albumin: Albumin, Serum: 2.4 g/dL (04-23 @ 20:13)      Allergies    No Known Allergies    Intolerances      MEDICATIONS  (STANDING):  piperacillin/tazobactam IVPB. 3.375Gram(s) IV Intermittent every 8 hours  dextrose 5% + sodium chloride 0.45%. 1000milliLiter(s) IV Continuous <Continuous>    MEDICATIONS  (PRN):  morphine  - Injectable 2milliGRAM(s) IV Push every 4 hours PRN Moderate Pain (4 - 6)  morphine  - Injectable 4milliGRAM(s) IV Push every 4 hours PRN Severe Pain (7 - 10)  acetaminophen   Tablet 650milliGRAM(s) Oral every 6 hours PRN For Temp greater than 38 C (100.4 F)  acetaminophen   Tablet. 650milliGRAM(s) Oral every 6 hours PRN Mild Pain (1 - 3)  ondansetron Injectable 4milliGRAM(s) IV Push every 4 hours PRN Nausea and/or Vomiting      RADIOLOGY/ADDITIONAL STUDIES:    EXAM:  CT ABDOMEN AND PELVIS                        PROCEDURE DATE:  04/24/2017    INTERPRETATION:  EXAM: CT ABDOMEN AND PELVIS WITHOUT CONTRAST    IMPRESSION:  Stomach and proximal small bowel loops are decompressed.  There are   dilated loops of mid small bowel measuring up to 4.5 cm multiple   air-fluid levels.  The distal small bowel is collapsed.  The colon is   underdistended.  Findings are suspicious for an early or low-grade bowel   obstruction.  Discrete transition point is not visualized.  The patient   would likely benefit from nasogastric tube placement.  If symptoms   persist or worsen, a follow-up CT with oral contrast can be performed.    There are two ventral abdominal wall hernias with nonobstructed distal   small bowel loop seen inside the rectus sheath; this does not contribute   to the patient's bowel obstruction.       Interval progression of masslike hypertrophy of the left hepatic lobe   which now measures 9 x 19 x 19 cm compared to 8 x14 x 16.5 cm   08/02/2016.  Underlying cirrhosis should be considered.  MRI can be   performed to exclude hepatocellular carcinoma.  Liver biopsy can be   performed as clinically warranted.    Progression of osseous metastatic disease throughout the visualized   spine, ribs and bony pelvis since 08/02/2014.    EXAM:  US ABDOMEN LIMITED                        PROCEDURE DATE:  04/23/2017    INTERPRETATION:  CLINICAL INFORMATION: Vomiting.  Fever.  History of   cancer.    IMPRESSION:     Gallbladder sludge.  No evidence of gallstones, cholecystitis or biliary   duct ductal dilatation.      DASHA ABRAHAM   This document has been electronically signed. Apr 23 2017 10:39PM

## 2017-04-24 NOTE — CONSULT NOTE ADULT - ASSESSMENT
56 yr old female w/ PMHx extensive abd surgery as a child (?omphaloceole), severe knee OA (awaiting replacement) recently diagnosed with metastatic cervical cancer with extensive bone mets in 8/16, after surgery for spinal stabilization, subsequently found to have a large cervical/uterine mass obstructing the ureters. Now s/p RT to spine, chemotherapy with 6 cycles of cisplatin and taxol + RT to pelvis. Of note, last bone scan in 2/16 showed widespread bone mets but asx,  and enlarged left lobe of liver with normal LFTs were also noted. No longer on chemo but is receiving xgeva for bone stabilization. Now admitted on 4/24 for abdominal pain, vomiting and no BM x1 week. CT abd pelvis c/w early low grade SBO (s/p NGT placement), enlarging liver mass, and worsening bony mets, now with abnormal LFTs. Pt with likely poor prognosis. Palliative care consulted to help with establishing GOC.      1) N/V  - 2/2 to SBO  - surgical notes appreciated  - NGT placed  - medical management for now  - c/w zofran, will also add low dose steroids to help with this likely malignant SBO (decadron 4mg IVqd)    2) Abd pain  - nociceptive pain 2/2 to visceral stretch from underlying SBO and enlarging liver mass  - c/w morphine IV 2mg or 4mg prn, no doses needed yet  - monitor response    3) Metastatic cervical cancer   - widespread bone mets, worsening, also worsening liver mass  - awaiting MRI for further workup  - elvin/gyn also consulted, likely no surgical intervention  - oncology notes appreciated  - not candidate for chemo at this moment, may be if improves  - likely overall poor prognosis, elevated LFTs    4) Debility  - PPS 40-50%, active before this  - evidence of protein malnutrition with albumin 2.4    5) Prognosis  - likely poor  - in light of extensive metastatic cervical cancer, c/b sbo, enlarging liver mass, worsened bony mets, abn LFTs, and poor nutritional status with pauctiy of curative options pt would likely fit criteria for hospice if no cure possible or opting out of this    6) GOC/aDvanced Directives  - pt seems to have capacity for medical decision-making, will need more information cc: possible cognitive issues from family, pt would like to be aware of all medical updates, preferring family to be involved when shared  - full code  - will clarify HCP status, for now surrogates are father Julio Burentt [phone] and sister Awilda Lewis [phone]  - father to get back to our team cc: meeting time for tomorrow. Hope to solidify HCP, and begin to share information, as well as gather wishes for care moving forward.    Thank you for including us in Ms. Burnett's care. Will continue to follow with you.    Helen Bhardwaj MD  Palliative Care Attending

## 2017-04-24 NOTE — H&P ADULT - ASSESSMENT
This is a 56 y.o F PMH of prior abdominal surgeries as a child with metastatic cervical cancer to bone presents with abdominal pain, nausea and vomiting concerning for early small bowel obstruction and likely metastatic disease to the liver:     # Small Bowel Obstruction  # Nausea / Vomiting  # Acute Abdominal Pain  - NGT, IVFs for hydration, NPO and pain medications.   - serial abdominal exams. Case discussed with Dr. Gonzalez and also consult placed to Dr. Huang who has seen patient outpatient for Gyn exams. No reported gyn surgeries previously.   - will continue IV Zosyn empirically in the setting of fever. Appreciate ID recs.   - await blood culture results.     # Metastatic Cervical Cancer - known bony mets now progressed and hepatomegaly concerning for visceral involvement.   - MRI abdomin ordered for delineation.   - GI consult for further input.   - Heme following, last Chemo in Jan 2017.   - Consult Palliative Care for additional support, GOC.     DVT ppx: Venodynes, hold off on pharmacological ppx in case surgery needed.   Diet: NPO  Dispo: admit to 1N, floor.     Case discussed with team, Onc, Surgery, Palliative, Heme.

## 2017-04-24 NOTE — PROGRESS NOTE ADULT - SUBJECTIVE AND OBJECTIVE BOX
CC:Patient is a 56y old  Female who presents with a chief complaint of     Subjective:  Pt seen and examined at bedside with chaperone and . Pt is AAOx3, pt in no acute distress. Pt states improved abd pain since admission. Pt denied c/o fever, chills, chest pain, SOB, N/V/D, extremity pain or dysfunction, hemoptysis, hematemesis, hematuria, hematochexia, headache, diplopia, vertigo, dizzyness. Pt states (+) void, (+) ambulation, no bowel function of flatus or bm today    ROS:  abd pain, obstipation, otherwise negative ROS    Vital Signs Last 24 Hrs  T(C): 36.4, Max: 38.7 (04-23 @ 21:00)  T(F): 97.6, Max: 101.7 (04-23 @ 21:00)  HR: 86 (86 - 117)  BP: 104/61 (104/61 - 123/72)  BP(mean): --  RR: 20 (16 - 20)  SpO2: 98% (93% - 100%)    Labs:    CARDIAC MARKERS ( 24 Apr 2017 02:52 )  <0.015 ng/mL / x     / x     / x     / x      CARDIAC MARKERS ( 23 Apr 2017 23:53 )  <0.015 ng/mL / x     / x     / x     / x      CARDIAC MARKERS ( 23 Apr 2017 20:13 )  <0.015 ng/mL / x     / x     / x     / x                                11.4   10.9  )-----------( 160      ( 23 Apr 2017 21:13 )             32.9     CBC Full  -  ( 23 Apr 2017 21:13 )  WBC Count : 10.9 K/uL  Hemoglobin : 11.4 g/dL  Hematocrit : 32.9 %  Platelet Count - Automated : 160 K/uL  Mean Cell Volume : 85.1 fl  Mean Cell Hemoglobin : 29.4 pg  Mean Cell Hemoglobin Concentration : 34.6 gm/dL  Auto Neutrophil # : 9.9 K/uL  Auto Lymphocyte # : 0.1 K/uL  Auto Monocyte # : 0.8 K/uL  Auto Eosinophil # : 0.0 K/uL  Auto Basophil # : 0.1 K/uL  Auto Neutrophil % : 90.6 %  Auto Lymphocyte % : 1.3 %  Auto Monocyte % : 6.9 %  Auto Eosinophil % : 0.3 %  Auto Basophil % : 0.8 %    04-23    127<L>  |  90<L>  |  17  ----------------------------<  132<H>  3.7   |  24  |  0.73    Ca    7.3<L>      23 Apr 2017 20:13    TPro  6.7  /  Alb  2.4<L>  /  TBili  2.5<H>  /  DBili  x   /  AST  203<H>  /  ALT  84<H>  /  AlkPhos  420<H>  04-23    LIVER FUNCTIONS - ( 23 Apr 2017 20:13 )  Alb: 2.4 g/dL / Pro: 6.7 gm/dL / ALK PHOS: 420 U/L / ALT: 84 U/L / AST: 203 U/L / GGT: x           PT/INR - ( 23 Apr 2017 20:13 )   PT: 16.9 sec;   INR: 1.55 ratio         PTT - ( 23 Apr 2017 20:13 )  PTT:32.5 sec      Meds:  morphine  - Injectable 2milliGRAM(s) IV Push every 4 hours PRN  morphine  - Injectable 4milliGRAM(s) IV Push every 4 hours PRN  ondansetron    Tablet 4milliGRAM(s) Oral every 4 hours PRN  piperacillin/tazobactam IVPB. 3.375Gram(s) IV Intermittent every 8 hours  acetaminophen   Tablet 650milliGRAM(s) Oral every 6 hours PRN  acetaminophen   Tablet. 650milliGRAM(s) Oral every 6 hours PRN  dextrose 5% + sodium chloride 0.45%. 1000milliLiter(s) IV Continuous <Continuous>      Radiology:  EXAM:  CT ABDOMEN AND PELVIS                            PROCEDURE DATE:  04/24/2017        INTERPRETATION:  EXAM: CT ABDOMEN AND PELVIS WITHOUT CONTRAST    CLINICAL INFORMATION:  Flank pain and vomiting.  History of metastatic   cervical cancer.    TECHNIQUE:   Axial CT images were acquired through the abdomen and pelvis   Intravenous contrast:  None.  Oral contrast: None.   Coronal and sagittal reformats were generated.     COMPARISON STUDY:  Contrast-enhanced CT from 08/02/2016.    FINDINGS:   The examination is limited by respiratory motion artifact and lack of   IV/oral contrast.    Visualized lower chest: Unremarkable.    Liver: Interval progression of masslike hypertrophy of the left hepatic   lobe which measures up to 9 x 19 x 19 cm compared to 8 x 14 x 16.5 cm on   08/02/2016.  Bile ducts: Common bile duct not visualized.  Gallbladder: Poorly visualized.  Spleen: Unremarkable.  Pancreas: Partially obscured by motion artifact.  Grossly unremarkable.  Adrenal glands: Unremarkable.  Kidneys/ureters: No hydronephrosis, renal stones or perinephric   infiltration    Bladder: Unremarkable.  Reproductive organs: Multilobulated appearance of the uterus compatible   with multiple fibroids is unchanged in appearance from 08/02/2016.    Bowel: Stomach and proximal small bowel loops are decompressed.  There   are dilated loops of mid small bowel measuring up to 4.5 cm with multiple   air-fluid levels.  Distal small bowel is collapsed.  Colon is   underdistended.  Findings are suspicious for an early or low-grade bowel   obstruction.  Normal appendix.  Peritoneum: No ascites, free air or abscess.    Retroperitoneum: No lymphadenopathy.  Vasculature: After cirrhotic calcifications of the aortoiliac tree.    Abdominal wall/soft tissues: There are two ventral abdominal wall hernias   with nonobstructed distal small bowel loops seen in the rectus sheath.  Bones: Since 08/02/2014 there is been marked progression of osseous   metastatic disease throughout the visualized spine, ribs and bony pelvis.    The patient is status post T9-L4 posterior spinal fusion with raquel screw   construct as well as T1 corpectomy with placement of a strut graft.    Patient.      IMPRESSION:  Stomach and proximal small bowel loops are decompressed.  There are   dilated loops of mid small bowel measuring up to 4.5 cm multiple   air-fluid levels.  The distal small bowel is collapsed.  The colon is   underdistended.  Findings are suspicious for an early or low-grade bowel   obstruction.  Discrete transition point is not visualized.  The patient   would likely benefit from nasogastric tube placement.  If symptoms   persist or worsen, a follow-up CT with oral contrast can be performed.    There are two ventral abdominal wall hernias with nonobstructed distal   small bowel loop seen inside the rectus sheath; this does not contribute   to the patient's bowel obstruction.       Interval progression of masslike hypertrophy of the left hepatic lobe   which now measures 9 x 19 x 19 cm compared to 8 x14 x 16.5 cm   08/02/2016.  Underlying cirrhosis should be considered.  MRI can be   performed to exclude hepatocellular carcinoma.  Liver biopsy can be   performed as clinically warranted.    Progression of osseous metastatic disease throughout the visualized   spine, ribs and bony pelvis since 08/02/2014.              DASHA ABRAHAM   This document has been electronically signed. Apr 24 2017  1:27AM    Physical exam:  Pt is aaox3  Pt in no acute distress  Resp: CTAB  CVS: S1S2(+)  ABD: bowel sounds (+), soft, mildly distended, no rebound, no guarding, no rigidity, no skin changes to exam. (+) mild lower b/l abd tenderness to exam, nontender reducible ventral hernias to exam. NGT demonstrates approrpaite bilious output  EXT: no calf tenderness or edema to exam b/l, on VTE prophylaxis  Skin: no skin changes to exam

## 2017-04-24 NOTE — ED ADULT NURSE REASSESSMENT NOTE - NS ED NURSE REASSESS COMMENT FT1
Pt received from prior RN sleeping, but easily awakens. Pt with NGT draining to suction.  Awaiting admission orders at this time. Will cont to monitor.
Pt reports some relief of pain and nausea since NGT insertion. Declines pain medication at this time. Medicated as ordered and maintained NPO. Plan of care discussed with pt and father with verbalized understanding. WIll cont to monitor.
Pt. is resting sleeping in bed- Rash over neck disappeared and pt. states to feel much better at this time- Pending CT of Abdomen - Cardiac monitor in place- Will cont to monitor closely- Family at the bedside
Received patient from Lesli HENDRICKS from Intake area - Pt. febrile and Tachycardic- Code Sepsis called at 2108- BC x 2 performed as Sepsis protocol- IV in place and IVFs and antibiotics infusing as ordered. Rash noted in the neck and pt. states it started once she arrived to the ED- Denies new meds and or foods- medicated with benadryl as ordered. Pt. placed on cardiac monitor - Family a the bedside - Will cont to monitor pt. closely- Safety maintained
Pt. is resting in bed- Low grade rectal temp noted, medicated with Tylenol 650mg Rectally as ordered. Pt. bladder scanned for urine, 98 ml noted in bladder- IVFs NS 1000ml Bolus initiated as ordered. Pt. denies urgency to void at this time- NG Tube draining effectively- Perineal care performed- Will cont to monitor pt. closely- Safety maintained

## 2017-04-24 NOTE — PROGRESS NOTE ADULT - ASSESSMENT
A/P:  SBO  Metastatic cervical cancer  Hepatomegaly r/o malignancy  NPO, IV hydration  NGT decompression  GI/DVT prophylaxis  Pain control  Monitor for return of bowel function  Serial abd exams  F/U labs, radiologic studies  Pt will be monitored for signs of evolution/resolution of pathology and surgical intervention as required and warranted  Oncology on consult  Medical management per primary service  ID on consult  Pt aware of and agrees with all of the above

## 2017-04-24 NOTE — H&P ADULT - HISTORY OF PRESENT ILLNESS
This is a 57 y/o female with PMH of omphalocele s/p multiple abdominal surgeries as a child, metastatic cervical cancer with extensive bone mets (diagnosed in 8/16) s/p surgery for spinal stabilization s/p XRT to her pelvis and chemotherapy (cisplatin and taxol). Patient last completed chemotherapy in Jan 2017 and has dealt with chronic constipation issues. She reports having weakness and persistent nausea and vomiting. In the ED, patient notably febrile, lactate 3.3. CT abd / pelvis revealed early small bowel obstruction with large liver mass concerning for visceral metastic disease (new for patient). She was given IV Zosyn and Vanc empirically, seen by surgery with NG tube placed.     Patient very weak with mild abdominal pain having some relief with NGT. No CP or SOB. Fever resolved.   ROS: stated above.       PAST MEDICAL & SURGICAL HISTORY:  cervical cancer as above with bone mets  lumbar spine stabilization for pathological fracture  mediport  Hernia repair  abdominal wall defect, congenital.  Omphacele    Social Hx: lives with , former smoker quit > 1 year ago. No illicit drug use.   Family Hx: unknown  NKDA    Vital Signs Last 24 Hrs  T(C): 36.4, Max: 38.7 (04-23 @ 21:00)  T(F): 97.6, Max: 101.7 (04-23 @ 21:00)  HR: 86 (86 - 117)  BP: 104/61 (104/61 - 123/72)  BP(mean): --  RR: 20 (16 - 20)  SpO2: 98% (93% - 100%)    PHYSICAL EXAM:  Constitutional: middle aged female weak appearing in mild distress.   HEENT: PERR, EOMI, Normal Hearing, NGT in place, dark brown feculent material in suction bottle  Neck: Soft and supple, No LAD, No JVD  Respiratory: Breath sounds are clear bilaterally, No wheezing, rales or rhonchi  Cardiovascular: S1 and S2, regular rate and rhythm, no Murmurs, gallops or rubs  Gastrointestinal: Bowel Sounds present however with notable distention. Nontender to light palpation. Enlarged liver. No guarding, no rebound  Extremities: No peripheral edema  Vascular: 2+ peripheral pulses  Neurological: A/O x 3, no focal deficits  Musculoskeletal: 5/5 strength b/l upper and lower extremities  Skin: No rashes      MEDICATIONS  (STANDING):  piperacillin/tazobactam IVPB. 3.375Gram(s) IV Intermittent every 8 hours  dextrose 5% + sodium chloride 0.45%. 1000milliLiter(s) IV Continuous <Continuous>      LABS: All Labs Reviewed:                        11.4   10.9  )-----------( 160      ( 23 Apr 2017 21:13 )             32.9     04-23    127<L>  |  90<L>  |  17  ----------------------------<  132<H>  3.7   |  24  |  0.73    Ca    7.3<L>      23 Apr 2017 20:13    TPro  6.7  /  Alb  2.4<L>  /  TBili  2.5<H>  /  DBili  x   /  AST  203<H>  /  ALT  84<H>  /  AlkPhos  420<H>  04-23    PT/INR - ( 23 Apr 2017 20:13 )   PT: 16.9 sec;   INR: 1.55 ratio    PTT - ( 23 Apr 2017 20:13 )  PTT:32.5 sec  CARDIAC MARKERS trops < 0.01 X 3.       Blood Culture: pending    Radiology:   CT Abd/Pelvis IMPRESSION:  Stomach and proximal small bowel loops are decompressed.  There are   dilated loops of mid small bowel measuring up to 4.5 cm multiple   air-fluid levels.  The distal small bowel is collapsed.  The colon is   underdistended.  Findings are suspicious for an early or low-grade bowel   obstruction.  Discrete transition point is not visualized.  The patient   would likely benefit from nasogastric tube placement.  If symptoms   persist or worsen, a follow-up CT with oral contrast can be performed.    There are two ventral abdominal wall hernias with nonobstructed distal   small bowel loop seen inside the rectus sheath; this does not contribute   to the patient's bowel obstruction.       Interval progression of masslike hypertrophy of the left hepatic lobe   which now measures 9 x 19 x 19 cm compared to 8 x14 x 16.5 cm   08/02/2016.  Underlying cirrhosis should be considered.  MRI can be   performed to exclude hepatocellular carcinoma.  Liver biopsy can be   performed as clinically warranted.    Progression of osseous metastatic disease throughout the visualized   spine, ribs and bony pelvis since 08/02/2014. This is a 55 y/o female with PMH of omphalocele s/p multiple abdominal surgeries as a child, metastatic cervical cancer with extensive bone mets (diagnosed in 8/16) s/p surgery for spinal stabilization s/p XRT to her pelvis and chemotherapy (cisplatin and taxol). Patient last completed chemotherapy in Jan 2017 and has dealt with chronic constipation issues. She reports having weakness and persistent nausea and vomiting. In the ED, patient notably febrile, lactate 3.3. CT abd / pelvis revealed early small bowel obstruction with large liver mass concerning for visceral metastic disease with enlarged liver (new for patient). She was given IV Zosyn and Vanc empirically, seen by surgery with NG tube placed.     Patient very weak with mild abdominal pain having some relief with NGT. No CP or SOB. Fever resolved.   ROS: stated above.       PAST MEDICAL & SURGICAL HISTORY:  cervical cancer as above with bone mets  lumbar spine stabilization for pathological fracture  mediport  Hernia repair  abdominal wall defect, congenital.  Omphacele    Social Hx: lives with , former smoker quit > 1 year ago. No illicit drug use.   Family Hx: unknown  NKDA    Vital Signs Last 24 Hrs  T(C): 36.4, Max: 38.7 (04-23 @ 21:00)  T(F): 97.6, Max: 101.7 (04-23 @ 21:00)  HR: 86 (86 - 117)  BP: 104/61 (104/61 - 123/72)  BP(mean): --  RR: 20 (16 - 20)  SpO2: 98% (93% - 100%)    PHYSICAL EXAM:  Constitutional: middle aged female weak appearing in mild distress. Awake.   HEENT: PERR, EOMI, Normal Hearing, NGT in place, dark brown feculent material in suction bottle  Neck: Soft and supple, No LAD, No JVD  Respiratory: Breath sounds are clear bilaterally, No wheezing, rales or rhonchi  Cardiovascular: S1 and S2, regular rate and rhythm, no Murmurs, gallops or rubs  Gastrointestinal: Bowel Sounds present however with notable distention. Nontender to light palpation. Enlarged liver. No guarding, no rebound  Extremities: No peripheral edema  Vascular: 2+ peripheral pulses  Neurological: A/O x 3, no focal deficits  Musculoskeletal: 5/5 strength b/l upper and lower extremities  Skin: No rashes      MEDICATIONS  (STANDING):  piperacillin/tazobactam IVPB. 3.375Gram(s) IV Intermittent every 8 hours  dextrose 5% + sodium chloride 0.45%. 1000milliLiter(s) IV Continuous <Continuous>      LABS: All Labs Reviewed:                        11.4   10.9  )-----------( 160      ( 23 Apr 2017 21:13 )             32.9     04-23    127<L>  |  90<L>  |  17  ----------------------------<  132<H>  3.7   |  24  |  0.73    Ca    7.3<L>      23 Apr 2017 20:13    TPro  6.7  /  Alb  2.4<L>  /  TBili  2.5<H>  /  DBili  x   /  AST  203<H>  /  ALT  84<H>  /  AlkPhos  420<H>  04-23    PT/INR - ( 23 Apr 2017 20:13 )   PT: 16.9 sec;   INR: 1.55 ratio    PTT - ( 23 Apr 2017 20:13 )  PTT:32.5 sec  CARDIAC MARKERS trops < 0.01 X 3.       Blood Culture: pending    Radiology:   CT Abd/Pelvis IMPRESSION:  Stomach and proximal small bowel loops are decompressed.  There are   dilated loops of mid small bowel measuring up to 4.5 cm multiple   air-fluid levels.  The distal small bowel is collapsed.  The colon is   underdistended.  Findings are suspicious for an early or low-grade bowel   obstruction.  Discrete transition point is not visualized.  The patient   would likely benefit from nasogastric tube placement.  If symptoms   persist or worsen, a follow-up CT with oral contrast can be performed.    There are two ventral abdominal wall hernias with nonobstructed distal   small bowel loop seen inside the rectus sheath; this does not contribute   to the patient's bowel obstruction.       Interval progression of masslike hypertrophy of the left hepatic lobe   which now measures 9 x 19 x 19 cm compared to 8 x14 x 16.5 cm   08/02/2016.  Underlying cirrhosis should be considered.  MRI can be   performed to exclude hepatocellular carcinoma.  Liver biopsy can be   performed as clinically warranted.    Progression of osseous metastatic disease throughout the visualized   spine, ribs and bony pelvis since 08/02/2014.    US Abd: Gallbladder sludge.  No evidence of gallstones, cholecystitis or biliary   duct ductal dilatation.

## 2017-04-25 LAB
ALBUMIN SERPL ELPH-MCNC: 2 G/DL — LOW (ref 3.3–5)
ALP SERPL-CCNC: 347 U/L — HIGH (ref 40–120)
ALT FLD-CCNC: 78 U/L — SIGNIFICANT CHANGE UP (ref 12–78)
ANION GAP SERPL CALC-SCNC: 13 MMOL/L — SIGNIFICANT CHANGE UP (ref 5–17)
ANISOCYTOSIS BLD QL: SLIGHT — SIGNIFICANT CHANGE UP
AST SERPL-CCNC: 192 U/L — HIGH (ref 15–37)
BASOPHILS # BLD AUTO: 0.1 K/UL — SIGNIFICANT CHANGE UP (ref 0–0.2)
BASOPHILS NFR BLD AUTO: 1.3 % — SIGNIFICANT CHANGE UP (ref 0–2)
BILIRUB DIRECT SERPL-MCNC: 1.4 MG/DL — HIGH (ref 0–0.2)
BILIRUB INDIRECT FLD-MCNC: 0.5 MG/DL — SIGNIFICANT CHANGE UP (ref 0.2–1)
BILIRUB SERPL-MCNC: 1.9 MG/DL — HIGH (ref 0.2–1.2)
BUN SERPL-MCNC: 18 MG/DL — SIGNIFICANT CHANGE UP (ref 7–23)
CALCIUM SERPL-MCNC: 6.8 MG/DL — LOW (ref 8.5–10.1)
CHLORIDE SERPL-SCNC: 97 MMOL/L — SIGNIFICANT CHANGE UP (ref 96–108)
CO2 SERPL-SCNC: 24 MMOL/L — SIGNIFICANT CHANGE UP (ref 22–31)
CREAT SERPL-MCNC: 0.87 MG/DL — SIGNIFICANT CHANGE UP (ref 0.5–1.3)
EOSINOPHIL # BLD AUTO: 0 K/UL — SIGNIFICANT CHANGE UP (ref 0–0.5)
EOSINOPHIL NFR BLD AUTO: 0.1 % — SIGNIFICANT CHANGE UP (ref 0–6)
GLUCOSE SERPL-MCNC: 129 MG/DL — HIGH (ref 70–99)
HCT VFR BLD CALC: 33.6 % — LOW (ref 34.5–45)
HGB BLD-MCNC: 10.7 G/DL — LOW (ref 11.5–15.5)
HYPOCHROMIA BLD QL: SLIGHT — SIGNIFICANT CHANGE UP
LYMPHOCYTES # BLD AUTO: 0.1 K/UL — LOW (ref 1–3.3)
LYMPHOCYTES # BLD AUTO: 1.2 % — LOW (ref 13–44)
MANUAL DIF COMMENT BLD-IMP: SIGNIFICANT CHANGE UP
MCHC RBC-ENTMCNC: 27.7 PG — SIGNIFICANT CHANGE UP (ref 27–34)
MCHC RBC-ENTMCNC: 32 GM/DL — SIGNIFICANT CHANGE UP (ref 32–36)
MCV RBC AUTO: 86.7 FL — SIGNIFICANT CHANGE UP (ref 80–100)
MONOCYTES # BLD AUTO: 0.6 K/UL — SIGNIFICANT CHANGE UP (ref 0–0.9)
MONOCYTES NFR BLD AUTO: 6.1 % — SIGNIFICANT CHANGE UP (ref 2–14)
NEUTROPHILS # BLD AUTO: 8.6 K/UL — HIGH (ref 1.8–7.4)
NEUTROPHILS NFR BLD AUTO: 91.4 % — HIGH (ref 43–77)
PLAT MORPH BLD: NORMAL — SIGNIFICANT CHANGE UP
PLATELET # BLD AUTO: 159 K/UL — SIGNIFICANT CHANGE UP (ref 150–400)
POIKILOCYTOSIS BLD QL AUTO: SLIGHT — SIGNIFICANT CHANGE UP
POLYCHROMASIA BLD QL SMEAR: SLIGHT — SIGNIFICANT CHANGE UP
POTASSIUM SERPL-MCNC: 3.1 MMOL/L — LOW (ref 3.5–5.3)
POTASSIUM SERPL-SCNC: 3.1 MMOL/L — LOW (ref 3.5–5.3)
PROT SERPL-MCNC: 5.7 GM/DL — LOW (ref 6–8.3)
RBC # BLD: 3.87 M/UL — SIGNIFICANT CHANGE UP (ref 3.8–5.2)
RBC # FLD: 14.9 % — HIGH (ref 10.3–14.5)
RBC BLD AUTO: (no result)
SODIUM SERPL-SCNC: 134 MMOL/L — LOW (ref 135–145)
TOXIC GRANULES BLD QL SMEAR: PRESENT — SIGNIFICANT CHANGE UP
WBC # BLD: 9.4 K/UL — SIGNIFICANT CHANGE UP (ref 3.8–10.5)
WBC # FLD AUTO: 9.4 K/UL — SIGNIFICANT CHANGE UP (ref 3.8–10.5)

## 2017-04-25 RX ORDER — DIPHENHYDRAMINE HCL 50 MG
25 CAPSULE ORAL EVERY 6 HOURS
Qty: 0 | Refills: 0 | Status: DISCONTINUED | OUTPATIENT
Start: 2017-04-25 | End: 2017-04-27

## 2017-04-25 RX ORDER — POTASSIUM CHLORIDE 20 MEQ
10 PACKET (EA) ORAL
Qty: 0 | Refills: 0 | Status: COMPLETED | OUTPATIENT
Start: 2017-04-25 | End: 2017-04-25

## 2017-04-25 RX ORDER — PANTOPRAZOLE SODIUM 20 MG/1
40 TABLET, DELAYED RELEASE ORAL DAILY
Qty: 0 | Refills: 0 | Status: DISCONTINUED | OUTPATIENT
Start: 2017-04-25 | End: 2017-04-27

## 2017-04-25 RX ADMIN — Medication 4 MILLIGRAM(S): at 06:11

## 2017-04-25 RX ADMIN — SODIUM CHLORIDE 75 MILLILITER(S): 9 INJECTION, SOLUTION INTRAVENOUS at 20:05

## 2017-04-25 RX ADMIN — Medication 100 MILLIEQUIVALENT(S): at 14:49

## 2017-04-25 RX ADMIN — MORPHINE SULFATE 4 MILLIGRAM(S): 50 CAPSULE, EXTENDED RELEASE ORAL at 17:00

## 2017-04-25 RX ADMIN — Medication 100 MILLIEQUIVALENT(S): at 19:52

## 2017-04-25 RX ADMIN — PANTOPRAZOLE SODIUM 40 MILLIGRAM(S): 20 TABLET, DELAYED RELEASE ORAL at 11:48

## 2017-04-25 RX ADMIN — SODIUM CHLORIDE 75 MILLILITER(S): 9 INJECTION, SOLUTION INTRAVENOUS at 14:48

## 2017-04-25 RX ADMIN — SODIUM CHLORIDE 75 MILLILITER(S): 9 INJECTION, SOLUTION INTRAVENOUS at 00:03

## 2017-04-25 RX ADMIN — Medication 25 MILLIGRAM(S): at 10:57

## 2017-04-25 RX ADMIN — PIPERACILLIN AND TAZOBACTAM 25 GRAM(S): 4; .5 INJECTION, POWDER, LYOPHILIZED, FOR SOLUTION INTRAVENOUS at 06:52

## 2017-04-25 RX ADMIN — MORPHINE SULFATE 4 MILLIGRAM(S): 50 CAPSULE, EXTENDED RELEASE ORAL at 16:42

## 2017-04-25 RX ADMIN — Medication 100 MILLIEQUIVALENT(S): at 11:58

## 2017-04-25 NOTE — PROGRESS NOTE ADULT - ASSESSMENT
Metastatic cervical cancer  S/P chemotherapy  SBO ? adhesions  Enlarged liver; ? etiology R/O metastatic disease  Poor Performance status    A/P    NGT/ attempt at removal  Liver biopsy scheduled  Will present case in conference in AM

## 2017-04-25 NOTE — DIETITIAN INITIAL EVALUATION ADULT. - PERTINENT MEDS FT
dextrose 5% + NaCl 0.45% @75mL/hr, benadryl, protonix, KCl 10 mEq/100mL, decadron, morphine, zofran,

## 2017-04-25 NOTE — PROGRESS NOTE ADULT - ASSESSMENT
57 y/o female with h/o metastatic cervical cancer with extensive bone mets (diagnosed in 8/16) s/p surgery for spinal stabilization s/p XRT and chemotherapy (cisplatin). She has a large cervical/uterine mass obstructing the ureters. She was admitted on 4/23 for worsening abdominal pain, nausea and vomiting. Workup showed SBO and fever ro 10.7F. She was given zosyn IV.    1. Febrile syndrome is improving. ?cause ?tumor fever. cervical Ca with extensive bone mets and likely liver mets.  -BC x 2 are negative to date  -on zosyn 3.375 gm IV q8h # 2  -tolerating abx well so far; no side effects noted  -if cultures remain negative, plan to discontinue abx soon  -monitor temps  -f/u CBC  -prognosis is poor  -supportive care  2. Other issues: care per medicine and oncology

## 2017-04-25 NOTE — PROGRESS NOTE ADULT - SUBJECTIVE AND OBJECTIVE BOX
CC: abdominal pain.     HPI: This is a 57 y/o female with PMH of omphalocele s/p multiple abdominal surgeries as a child, metastatic cervical cancer with extensive bone mets (diagnosed in 8/16) s/p surgery for spinal stabilization s/p XRT to her pelvis and chemotherapy (cisplatin and taxol). Patient last completed chemotherapy in Jan 2017 and has dealt with chronic constipation issues. She reports having weakness and persistent nausea and vomiting. In the ED, patient notably febrile, lactate 3.3. CT abd / pelvis revealed early small bowel obstruction with large liver mass concerning for visceral metastic disease with enlarged liver (new for patient). She was given IV Zosyn and Vanc empirically, seen by surgery with NG tube placed.     4/25/17: Had normal bowel movement overnight, + flatus. Less abd pain. Will clamp NGT, await surgery recs to pull today. Afebrile.    ROS: stated above.     Vital Signs Last 24 Hrs  T(C): 36.8, Max: 36.8 (04-25 @ 05:42)  T(F): 98.2, Max: 98.2 (04-25 @ 05:42)  HR: 92 (92 - 103)  BP: 113/67 (96/61 - 113/67)  BP(mean): --  RR: 17 (17 - 18)  SpO2: 92% (92% - 94%)    PHYSICAL EXAM:  PHYSICAL EXAM:  Constitutional: middle aged female weak appearing in mild distress. Awake.   HEENT: PERR, EOMI, Normal Hearing, NGT in place, dark brown feculent material in suction bottle  Neck: Soft and supple, No LAD, No JVD  Respiratory: Breath sounds are clear bilaterally, No wheezing, rales or rhonchi  Cardiovascular: S1 and S2, regular rate and rhythm, no Murmurs, gallops or rubs  Gastrointestinal: Bowel Sounds present however with notable distention slightly less today. Nontender to light palpation. Enlarged liver. No guarding, no rebound  Extremities: No peripheral edema  Vascular: 2+ peripheral pulses  Neurological: A/O x 3, no focal deficits  Musculoskeletal: 5/5 strength b/l upper and lower extremities  Skin: No rashes      MEDICATIONS  (STANDING):  piperacillin/tazobactam IVPB. 3.375Gram(s) IV Intermittent every 8 hours  dextrose 5% + sodium chloride 0.45%. 1000milliLiter(s) IV Continuous <Continuous>  dexamethasone  Injectable 4milliGRAM(s) IV Push daily  pantoprazole  Injectable 40milliGRAM(s) IV Push daily      LABS: All Labs Reviewed:                        10.7   9.4   )-----------( 159      ( 25 Apr 2017 05:42 )             33.6      04-25    134<L>  |  97  |  18  ----------------------------<  129<H>  3.1<L>   |  24  |  0.87    Ca    6.8<L>      25 Apr 2017 05:42    TPro  5.7<L>  /  Alb  2.0<L>  /  TBili  1.9<H>  /  DBili  1.4<H>  /  AST  192<H>  /  ALT  78  /  AlkPhos  347<H>  04-25    PT/INR - ( 23 Apr 2017 20:13 )   PT: 16.9 sec;   INR: 1.55 ratio         Neg trops X 3.       Culture - Blood (04.23.17 @ 21:13)    Specimen Source: .Blood None    Culture Results:   No growth to date.        MRI ABDOMEN:   IMPRESSION:    Marked enlargement of the left lobe of the liver with suggestion of   multiple underlying masses, which appear atypical for metastatic lesions.   Imaging findings are more suggestive of multiple areas of focal nodular   hyperplasia localized to the left lobe of the liver. Further noninvasive   testing can be performed utilizing hepatocyte-specific contrast agent   (Eovist) on follow up MRI. Otherwise, biopsy of the left lobe should   yield the correct diagnosis.

## 2017-04-25 NOTE — PROGRESS NOTE ADULT - SUBJECTIVE AND OBJECTIVE BOX
· Subjective and Objective: 	  CC:Patient is a 56y old  Female who presents with a chief complaint of     Subjective:  Pt seen and examined at bedside with chaperone and . Pt is AAOx3, pt in no acute distress. Pt states improved abd pain since admission. Pt denied c/o fever, chills, chest pain, SOB, N/V/D, extremity pain or dysfunction, hemoptysis, hematemesis, hematuria, hematochexia, headache, diplopia, vertigo, dizzyness. Pt states (+) void, (+) ambulation, no bowel function of flatus or bm today    4/25 Feeling beter, +flatus, hungary, no NV, minimal NGT output.

## 2017-04-25 NOTE — DIETITIAN INITIAL EVALUATION ADULT. - NS AS NUTRI DX NUTRIENT
pt meets criteria for moderate malnutrition in acute illness/Inadequate protein-energy intake/Malnutrition

## 2017-04-25 NOTE — CHART NOTE - NSCHARTNOTEFT_GEN_A_CORE
Hospitalist Update Note    Patient with what appears to be Hives on face and upper chest. Itchy. Did not eat anything. States she has hives occassionally. No SOB.   - given the fact that blood ctx negative, afebrile without source of infection will STOP Zosyn. No known PCN allergy and tolerated Zosyn X 2 days.   - monitor  - benedryl for itch.

## 2017-04-25 NOTE — PROGRESS NOTE ADULT - SUBJECTIVE AND OBJECTIVE BOX
INTERVAL HISTORY:    NGT in place  Possible removal today  Plan for liver biopsy as per Dr Coombs and GI    No sig pain  appetite poor        Allergies    No Known Allergies    Intolerances        MEDICATIONS  (STANDING):  piperacillin/tazobactam IVPB. 3.375Gram(s) IV Intermittent every 8 hours  dextrose 5% + sodium chloride 0.45%. 1000milliLiter(s) IV Continuous <Continuous>  dexamethasone  Injectable 4milliGRAM(s) IV Push daily  pantoprazole  Injectable 40milliGRAM(s) IV Push daily    MEDICATIONS  (PRN):  morphine  - Injectable 2milliGRAM(s) IV Push every 4 hours PRN Moderate Pain (4 - 6)  morphine  - Injectable 4milliGRAM(s) IV Push every 4 hours PRN Severe Pain (7 - 10)  acetaminophen   Tablet 650milliGRAM(s) Oral every 6 hours PRN For Temp greater than 38 C (100.4 F)  acetaminophen   Tablet. 650milliGRAM(s) Oral every 6 hours PRN Mild Pain (1 - 3)  ondansetron Injectable 4milliGRAM(s) IV Push every 4 hours PRN Nausea and/or Vomiting      Vital Signs Last 24 Hrs  T(C): 36.8, Max: 36.8 (04- @ 05:42)  T(F): 98.2, Max: 98.2 (04-25 @ 05:42)  HR: 92 (92 - 103)  BP: 113/67 (96/61 - 113/67)  BP(mean): --  RR: 17 (17 - 18)  SpO2: 92% (92% - 94%)    PHYSICAL EXAM:    GENERAL: NAD,   HEAD:  Atraumatic, Normocephalic  EYES: EOMI, PERRLA, conjunctiva and sclera clear    NECK: Supple, No JVD, Normal thyroid  NERVOUS SYSTEM:    CHEST/LUNG: Clear to percussion bilaterally; No rales, rhonchi,   HEART: Regular rate and rhythm;   ABDOMEN: enlarged liver/ sl tender  EXTREMITIES:   edema:-  LYMPH: No lymphadenopathy noted        LABS:                        10.7   9.4   )-----------( 159      ( 2017 05:42 )             33.6     04-    134<L>  |  97  |  18  ----------------------------<  129<H>  3.1<L>   |  24  |  0.87    Ca    6.8<L>      2017 05:42    TPro  5.7<L>  /  Alb  2.0<L>  /  TBili  1.9<H>  /  DBili  1.4<H>  /  AST  192<H>  /  ALT  78  /  AlkPhos  347<H>  04-25    PT/INR - ( 2017 20:13 )   PT: 16.9 sec;   INR: 1.55 ratio         PTT - ( 2017 20:13 )  PTT:32.5 sec  Urinalysis Basic - ( 2017 18:10 )    Color: Genie / Appearance: Slightly Turbid / S.015 / pH: x  Gluc: x / Ketone: Trace  / Bili: Small / Urobili: 8 mg/dL   Blood: x / Protein: 30 mg/dL / Nitrite: Negative   Leuk Esterase: Small / RBC: 6-10 /HPF / WBC 11-25   Sq Epi: x / Non Sq Epi: Few / Bacteria: Moderate          RADIOLOGY & ADDITIONAL STUDIES:    PATHOLOGY:

## 2017-04-25 NOTE — PROGRESS NOTE ADULT - SUBJECTIVE AND OBJECTIVE BOX
Patient is a 56y old  Female who presents with a chief complaint of abdominal pain, N/V    HPI:  57 y/o female with h/o omphalocele, metastatic cervical cancer with extensive bone mets (diagnosed in 8/16) s/p surgery for spinal stabilization s/p XRT and chemotherapy (cisplatin). She has a large cervical/uterine mass obstructing the ureters. She was admitted on 4/23 for worsening abdominal pain, nausea and vomiting. Workup showed SBO and fever ro 10.7F. She was given zosyn IV.      PAST MEDICAL & SURGICAL HISTORY:  cervical cancer as above with bone mets  lumbar spine stabilization for pathological fracture  mediport  Hernia repair  abdominal wall defect, congenital.  Meds: per reconciliation sheet, noted below  MEDICATIONS  (STANDING):  piperacillin/tazobactam IVPB. 3.375Gram(s) IV Intermittent every 8 hours  dextrose 5% + sodium chloride 0.45%. 1000milliLiter(s) IV Continuous <Continuous>    MEDICATIONS  (PRN):  morphine  - Injectable 2milliGRAM(s) IV Push every 4 hours PRN Moderate Pain (4 - 6)  morphine  - Injectable 4milliGRAM(s) IV Push every 4 hours PRN Severe Pain (7 - 10)  ondansetron    Tablet 4milliGRAM(s) Oral every 4 hours PRN Nausea and/or Vomiting  acetaminophen   Tablet 650milliGRAM(s) Oral every 6 hours PRN For Temp greater than 38 C (100.4 F)  acetaminophen   Tablet. 650milliGRAM(s) Oral every 6 hours PRN Mild Pain (1 - 3)    Allergies    No Known Allergies    Intolerances      Social: no smoking, no alcohol, no illegal drugs; no recent travel, no exposure to TB  FAMILY HISTORY:    ROS: the patient denies fever, no chills, no HA, no dizziness, no sore throat, no blurry vision, no CP, no palpitations, no SOB, no cough, no abdominal pain, no diarrhea, no N/V, no dysuria, no leg pain, no claudication, no rash, no joint aches, no rectal pain or bleeding, no night sweats    Vital Signs Last 24 Hrs  T(C): 36.4, Max: 38.7 (04-23 @ 21:00)  T(F): 97.6, Max: 101.7 (04-23 @ 21:00)  HR: 86 (86 - 117)  BP: 104/61 (104/61 - 123/72)  BP(mean): --  RR: 20 (16 - 20)  SpO2: 98% (93% - 100%)  Daily     Daily     PE:    Constitutional: frail looking  HEENT: NC/AT, EOMI, PERRLA  Neck: supple  Back: no tenderness  Respiratory: clear  Cardiovascular: S1S2 regular, no murmurs  Abdomen: soft, not tender, not distended, positive BS  Genitourinary: deferred  Rectal: deferred  Musculoskeletal: no muscle tenderness, no joint swelling or tenderness  Extremities: no pedal edema  Neurological: AxOx3, moving all extremities, no focal deficits  Skin: no rashes    Labs:                        11.4   10.9  )-----------( 160      ( 23 Apr 2017 21:13 )             32.9     04-23    127<L>  |  90<L>  |  17  ----------------------------<  132<H>  3.7   |  24  |  0.73    Ca    7.3<L>      23 Apr 2017 20:13    TPro  6.7  /  Alb  2.4<L>  /  TBili  2.5<H>  /  DBili  x   /  AST  203<H>  /  ALT  84<H>  /  AlkPhos  420<H>  04-23     LIVER FUNCTIONS - ( 23 Apr 2017 20:13 )  Alb: 2.4 g/dL / Pro: 6.7 gm/dL / ALK PHOS: 420 U/L / ALT: 84 U/L / AST: 203 U/L / GGT: x           Culture - Blood (04.23.17 @ 20:13)    Specimen Source: .Blood Blood-Peripheral    Culture Results:   No growth to date.        Radiology:    CT abdomen and pelvis:  Stomach and proximal small bowel loops are decompressed.  There are   dilated loops of mid small bowel measuring up to 4.5 cm multiple   air-fluid levels.  The distal small bowel is collapsed.  The colon is   underdistended.  Findings are suspicious for an early or low-grade bowel   obstruction.  Discrete transition point is not visualized.  The patient   would likely benefit from nasogastric tube placement.  If symptoms   persist or worsen, a follow-up CT with oral contrast can be performed.    There are two ventral abdominal wall hernias with nonobstructed distal   small bowel loop seen inside the rectus sheath; this does not contribute   to the patient's bowel obstruction.       Interval progression of masslike hypertrophy of the left hepatic lobe   which now measures 9 x 19 x 19 cm compared to 8 x14 x 16.5 cm   08/02/2016.  Underlying cirrhosis should be considered.  MRI can be   performed to exclude hepatocellular carcinoma.  Liver biopsy can be   performed as clinically warranted.    Progression of osseous metastatic disease throughout the visualized   spine, ribs and bony pelvis since 08/02/2014.    Advanced directives addressed: full resuscitation

## 2017-04-25 NOTE — PROGRESS NOTE ADULT - ASSESSMENT
56 yr old female w/ PMHx extensive abd surgery as a child (?omphaloceole), severe knee OA (awaiting replacement) recently diagnosed with metastatic cervical cancer with extensive bone mets in 8/16, after surgery for spinal stabilization, subsequently found to have a large cervical/uterine mass obstructing the ureters. Now s/p RT to spine, chemotherapy with 6 cycles of cisplatin and taxol + RT to pelvis. Of note, last bone scan in 2/16 showed widespread bone mets but asx,  and enlarged left lobe of liver with normal LFTs were also noted. No longer on chemo but is receiving xgeva for bone stabilization. Now admitted on 4/24 for abdominal pain, vomiting and no BM x1 week. CT abd pelvis c/w early low grade SBO (s/p NGT placement), enlarging liver mass, and worsening bony mets, now with abnormal LFTs. Pt with likely poor prognosis. Palliative care consulted to help with establishing GOC.      1) N/V  - 2/2 to SBO  - surgical notes appreciated  - NGT placed to be D/C today  -Advance diet to full liquids as per surgery  - c/w zofran, will also add low dose steroids to help with this likely malignant SBO (decadron 4mg IVqd)    2) Abd pain  - nociceptive pain 2/2 to visceral stretch from underlying SBO and enlarging liver mass  - c/w morphine IV 2mg or 4mg prn, no doses needed yet  - monitor response    3) Metastatic cervical cancer   - widespread bone mets, worsening, also worsening liver mass  -Liver Bx to eval treatment options as per GI  - awaiting MRI for further workup  - elvin/gyn also consulted, likely no surgical intervention  - oncology notes appreciated  - not candidate for chemo at this moment, may be if improves  - likely overall poor prognosis, elevated LFTs    4) Debility  - PPS 40-50%, active before this  - evidence of protein malnutrition with albumin 2.4    5) Prognosis  - likely poor  - in light of extensive metastatic cervical cancer, c/b sbo, enlarging liver mass, worsened bony mets, abn LFTs, and poor nutritional status with pauctiy of curative options pt would likely fit criteria for hospice if no cure possible or opting out of this    6) GOC/aDvanced Directives  - pt seems to have capacity for medical decision-making, will need more information cc: possible cognitive issues from family, pt would like to be aware of all medical updates, preferring family to be involved when shared  - full code  - will clarify HCP status, for now surrogates are father Julio Burnett [phone] and sister Awilda Lewis [phone]  - [facility] discussion scheduled for 4/26 9am

## 2017-04-25 NOTE — PROGRESS NOTE ADULT - SUBJECTIVE AND OBJECTIVE BOX
HPI: Ms. Burnett is a 56 yr old female w/ PMHx extensive abd surgery as a child (?omphaloceole), severe knee OA (awaiting replacement) recently diagnosed with metastatic cervical cancer with extensive bone mets in 8/16, after surgery for spinal stabilization, subsequently found to have a large cervical/uterine mass obstructing the ureters. Now s/p RT to spine, chemotherapy with 6 cycles of cisplatin and taxol + RT to pelvis. Of note, last bone scan in 2/16 showed widespread bone mets but asx,  and enlarged left lobe of liver with normal LFTs were also noted. No longer on chemo but is receiving xgeva for bone stabilization. Admitted on 4/24 for abdominal pain, vomiting and no BM x1 week. CT abd pelvis c/w early low grade SBO (s/p NGT placement), enlarging liver mass, and worsening bony mets, now with abnormal LFTs. Pt with likely poor prognosis. Palliative care consulted to help with establishing GOC.    4/25/17 Seen and examined at bedside with father in room. Pt C/O discomfort R/T NG tube and is hopeful that will be D/C today.        PAIN: ( )Yes   (x )No  Level:  Location:  Intensity:    /10  Quality:  Aggravating Factors:  Alleviating Factors:  Radiation:  Duration/Timing:  Impact on ADLs:    DYSPNEA: ( ) Yes  (x ) No  Level:    PAST MEDICAL & SURGICAL HISTORY:  cervical cancer as above with bone mets  lumbar spine stabilization for pathological fracture  mediport  Hernia repair  abdominal wall defect, congenital.  Omphacele      SOCIAL HX: Lives with father and sister    Hx opiate tolerance ( )YES  (x )NO    Baseline ADLs  (Prior to Admission)  (x ) Independent   ( )Dependent    FAMILY HISTORY: unknown      Review of Systems:    Anxiety- denies  Physical Discomfort- mild  Constipation- denies, last bm in ED no issues  Diarrhea- denies  Nausea- yes  Vomiting- yes  Anorexia- yes  Weight Loss-   Cough/Secretions- no  Fatigue- yes  Weakness- yes    All other systems reviewed and negative        PHYSICAL EXAM:    Vital Signs Last 24 Hrs  T(C): 36.2, Max: 38.7 (04-23 @ 21:00)  T(F): 97.2, Max: 101.7 (04-23 @ 21:00)  HR: 92 (86 - 117)  BP: 108/62 (104/61 - 123/72)  BP(mean): --  RR: 18 (16 - 20)  SpO2: 98% (93% - 100%)  Daily     Daily     PPSV2: 40  %  FAST:    General: Middle aged female, sitting up in chair, NAD, flat affect, pleasant  Mental Status: aOx3  HEENT: NGT in place to wall suction with small amt dark brown fluid,   Lungs: dec at bases bl  Cardiac: +s1 s2 rrr  GI: +bs, soft, nt,   Ext: scant pedal edema  Neuro: seems to have some slowed mentation though answers questions mostly appropriately, otherwise no focal deficits      LABS:                                     10.7   9.4   )-----------( 159      ( 25 Apr 2017 05:42 )             33.6            04-25    134<L>  |  97  |  18  ----------------------------<  129<H>  3.1<L>   |  24  |  0.87    Ca    6.8<L>      25 Apr 2017 05:42    TPro  5.7<L>  /  Alb  2.0<L>  /  TBili  1.9<H>  /  DBili  1.4<H>  /  AST  192<H>  /  ALT  78  /  AlkPhos  347<H>  04-25    Ca    7.3<L>      23 Apr 2017 20:13             Allergies    No Known Allergies    Intolerances        RADIOLOGY/ADDITIONAL STUDIES:    EXAM:  CT ABDOMEN AND PELVIS                        PROCEDURE DATE:  04/24/2017    INTERPRETATION:  EXAM: CT ABDOMEN AND PELVIS WITHOUT CONTRAST    IMPRESSION:  Stomach and proximal small bowel loops are decompressed.  There are   dilated loops of mid small bowel measuring up to 4.5 cm multiple   air-fluid levels.  The distal small bowel is collapsed.  The colon is   underdistended.  Findings are suspicious for an early or low-grade bowel   obstruction.  Discrete transition point is not visualized.  The patient   would likely benefit from nasogastric tube placement.  If symptoms   persist or worsen, a follow-up CT with oral contrast can be performed.    There are two ventral abdominal wall hernias with nonobstructed distal   small bowel loop seen inside the rectus sheath; this does not contribute   to the patient's bowel obstruction.       Interval progression of masslike hypertrophy of the left hepatic lobe   which now measures 9 x 19 x 19 cm compared to 8 x14 x 16.5 cm   08/02/2016.  Underlying cirrhosis should be considered.  MRI can be   performed to exclude hepatocellular carcinoma.  Liver biopsy can be   performed as clinically warranted.    Progression of osseous metastatic disease throughout the visualized   spine, ribs and bony pelvis since 08/02/2014.    EXAM:  US ABDOMEN LIMITED                        PROCEDURE DATE:  04/23/2017    INTERPRETATION:  CLINICAL INFORMATION: Vomiting.  Fever.  History of   cancer.    IMPRESSION:     Gallbladder sludge.  No evidence of gallstones, cholecystitis or biliary   duct ductal dilatation.      DASHA ABRAHAM   This document has been electronically signed. Apr 23 2017 10:39PM

## 2017-04-25 NOTE — DIETITIAN INITIAL EVALUATION ADULT. - SIGNS/SYMPTOMS
sister reports decreased PO intake for about 1 week PTA, pt experiencing N/V, and pt currently NPO PO intake <75% 1 week PTA and mild (1+) edema in extremeties

## 2017-04-25 NOTE — PROGRESS NOTE ADULT - ASSESSMENT
This is a 56 y.o F PMH of prior abdominal surgeries as a child with metastatic cervical cancer to bone presents with abdominal pain, nausea and vomiting concerning for early small bowel obstruction and likely metastatic disease to the liver:     # Small Bowel Obstruction  # Nausea / Vomiting  # Acute Abdominal Pain  - with improvement and normal BM --> CLAMP NGT today.   - await surgical recs for pulling NGT.   - IV decadron added to aid in decreasing intestinal inflammation.   - cont IVFs, pain meds, NPO.   - GYN Onc Dr. Huang aware of patient's hospitalization, no gyn onc surgery planned and defers to general surgery for management of SBO.   - on IV Zosyn day #2, likely d/c soon if B. ctx remain negative.   -  Appreciate ID recs.     # Liver Lesion - concerning for malignancy (new).   - discussed with Onc and GI today, prefer IR biopsy for further delineation.   - unclear how this will  for patient with poor functional status.   - IR consult placed, not on blood thinners.     # Metastatic Cervical Cancer - known bony mets now progressed and hepatomegaly concerning for visceral involvement.   - MRI abdomin results noted above.   - GI following, IR consult for biopsy.   - Heme following, last Chemo in Jan 2017.   - Consult Palliative Care for additional support, GOC as patient will likely be a hospice candidate if no further treatment available.     # Hypokalemia- replete and recheck AM labs, check Mag.     DVT ppx: Venodynes, hold off on pharmacological ppx in case surgery needed.   Diet: NPO  Dispo: remain inpatient pending biopsy.     Case discussed with team, Onc,  Palliative, Heme.

## 2017-04-26 LAB
ALBUMIN SERPL ELPH-MCNC: 1.9 G/DL — LOW (ref 3.3–5)
ALP SERPL-CCNC: 339 U/L — HIGH (ref 40–120)
ALT FLD-CCNC: 72 U/L — SIGNIFICANT CHANGE UP (ref 12–78)
ANION GAP SERPL CALC-SCNC: 12 MMOL/L — SIGNIFICANT CHANGE UP (ref 5–17)
AST SERPL-CCNC: 179 U/L — HIGH (ref 15–37)
BASOPHILS # BLD AUTO: 0.1 K/UL — SIGNIFICANT CHANGE UP (ref 0–0.2)
BASOPHILS NFR BLD AUTO: 0.7 % — SIGNIFICANT CHANGE UP (ref 0–2)
BILIRUB DIRECT SERPL-MCNC: 1.1 MG/DL — HIGH (ref 0–0.2)
BILIRUB INDIRECT FLD-MCNC: 0.5 MG/DL — SIGNIFICANT CHANGE UP (ref 0.2–1)
BILIRUB SERPL-MCNC: 1.6 MG/DL — HIGH (ref 0.2–1.2)
BUN SERPL-MCNC: 16 MG/DL — SIGNIFICANT CHANGE UP (ref 7–23)
CALCIUM SERPL-MCNC: 6.4 MG/DL — CRITICAL LOW (ref 8.5–10.1)
CHLORIDE SERPL-SCNC: 98 MMOL/L — SIGNIFICANT CHANGE UP (ref 96–108)
CO2 SERPL-SCNC: 24 MMOL/L — SIGNIFICANT CHANGE UP (ref 22–31)
CREAT SERPL-MCNC: 0.81 MG/DL — SIGNIFICANT CHANGE UP (ref 0.5–1.3)
EOSINOPHIL # BLD AUTO: 0 K/UL — SIGNIFICANT CHANGE UP (ref 0–0.5)
EOSINOPHIL NFR BLD AUTO: 0.4 % — SIGNIFICANT CHANGE UP (ref 0–6)
GLUCOSE SERPL-MCNC: 101 MG/DL — HIGH (ref 70–99)
HCT VFR BLD CALC: 33 % — LOW (ref 34.5–45)
HGB BLD-MCNC: 10.4 G/DL — LOW (ref 11.5–15.5)
LYMPHOCYTES # BLD AUTO: 0.2 K/UL — LOW (ref 1–3.3)
LYMPHOCYTES # BLD AUTO: 2.8 % — LOW (ref 13–44)
MAGNESIUM SERPL-MCNC: 1.7 MG/DL — LOW (ref 1.8–2.4)
MCHC RBC-ENTMCNC: 27.2 PG — SIGNIFICANT CHANGE UP (ref 27–34)
MCHC RBC-ENTMCNC: 31.4 GM/DL — LOW (ref 32–36)
MCV RBC AUTO: 86.7 FL — SIGNIFICANT CHANGE UP (ref 80–100)
MONOCYTES # BLD AUTO: 0.7 K/UL — SIGNIFICANT CHANGE UP (ref 0–0.9)
MONOCYTES NFR BLD AUTO: 7.8 % — SIGNIFICANT CHANGE UP (ref 2–14)
NEUTROPHILS # BLD AUTO: 7.6 K/UL — HIGH (ref 1.8–7.4)
NEUTROPHILS NFR BLD AUTO: 88.3 % — HIGH (ref 43–77)
PLATELET # BLD AUTO: 138 K/UL — LOW (ref 150–400)
POTASSIUM SERPL-MCNC: 3.3 MMOL/L — LOW (ref 3.5–5.3)
POTASSIUM SERPL-SCNC: 3.3 MMOL/L — LOW (ref 3.5–5.3)
PROT SERPL-MCNC: 5.1 GM/DL — LOW (ref 6–8.3)
RBC # BLD: 3.81 M/UL — SIGNIFICANT CHANGE UP (ref 3.8–5.2)
RBC # FLD: 15.2 % — HIGH (ref 10.3–14.5)
SODIUM SERPL-SCNC: 134 MMOL/L — LOW (ref 135–145)
WBC # BLD: 8.6 K/UL — SIGNIFICANT CHANGE UP (ref 3.8–10.5)
WBC # FLD AUTO: 8.6 K/UL — SIGNIFICANT CHANGE UP (ref 3.8–10.5)

## 2017-04-26 PROCEDURE — 74183 MRI ABD W/O CNTR FLWD CNTR: CPT | Mod: 26

## 2017-04-26 RX ORDER — SODIUM CHLORIDE 9 MG/ML
500 INJECTION INTRAMUSCULAR; INTRAVENOUS; SUBCUTANEOUS ONCE
Qty: 0 | Refills: 0 | Status: COMPLETED | OUTPATIENT
Start: 2017-04-26 | End: 2017-04-26

## 2017-04-26 RX ORDER — MAGNESIUM SULFATE 500 MG/ML
2 VIAL (ML) INJECTION ONCE
Qty: 0 | Refills: 0 | Status: COMPLETED | OUTPATIENT
Start: 2017-04-26 | End: 2017-04-26

## 2017-04-26 RX ORDER — POTASSIUM CHLORIDE 20 MEQ
40 PACKET (EA) ORAL ONCE
Qty: 0 | Refills: 0 | Status: COMPLETED | OUTPATIENT
Start: 2017-04-26 | End: 2017-04-26

## 2017-04-26 RX ADMIN — PANTOPRAZOLE SODIUM 40 MILLIGRAM(S): 20 TABLET, DELAYED RELEASE ORAL at 13:48

## 2017-04-26 RX ADMIN — Medication 4 MILLIGRAM(S): at 06:28

## 2017-04-26 RX ADMIN — Medication 25 MILLIGRAM(S): at 10:04

## 2017-04-26 RX ADMIN — MORPHINE SULFATE 4 MILLIGRAM(S): 50 CAPSULE, EXTENDED RELEASE ORAL at 14:33

## 2017-04-26 RX ADMIN — Medication 25 MILLIGRAM(S): at 05:48

## 2017-04-26 RX ADMIN — SODIUM CHLORIDE 1000 MILLILITER(S): 9 INJECTION INTRAMUSCULAR; INTRAVENOUS; SUBCUTANEOUS at 05:40

## 2017-04-26 RX ADMIN — Medication 40 MILLIEQUIVALENT(S): at 13:48

## 2017-04-26 RX ADMIN — MORPHINE SULFATE 4 MILLIGRAM(S): 50 CAPSULE, EXTENDED RELEASE ORAL at 15:07

## 2017-04-26 RX ADMIN — Medication 50 GRAM(S): at 09:50

## 2017-04-26 NOTE — CHART NOTE - NSCHARTNOTEFT_GEN_A_CORE
Upon Nutritional Assessment by the Registered Dietitian your patient was determined to meet criteria has evidence of the following diagnosis/diagnoses:        [ ]  Mild Protein Calorie Malnutrition        [x]  Moderate Protein Calorie Malnutrition        [ ] Severe Protein Calorie Malnutrition        [ ] Unspecified Protein Calorie Malnutrition    Findings as based on:  •  Comprehensive nutrition assessment and Nutrition focused physical examination  •  Insufficient food/energy intake  •  Weight loss over time(Please specify time period)  •  Loss of muscle mass  •  Loss of fat mass  •  Fluid accumulation    Findings relevant to patient:  1. Insufficient food/energy intake  2.Fluid accumulation  3    Nutrition Interventions:  1. Advance diet as medically feasible  2. Order 8oz chocolate ensure TID   3.    TO BE COMPLETED BY PROVIDER:          [ ] Agree:         [ ] Disagree:    Comments: Fluid accumulation may be masking true weight loss.

## 2017-04-26 NOTE — GOALS OF CARE CONVERSATION - PERSONAL ADVANCE DIRECTIVE - TREATMENT GUIDELINE COMMENT
no limits set currently, clarified that sister is HCP (via POa that she will bring in). Awaiting more information about prognosis and plan moving forward before discussing code status, pt already noted feeling overwhelmed when just considering what is already on the table. Spent 32 minutes discussing GOC with family including Advance care planning including the explanation and discussion of advance directives inclusive for now of proxy designations, more to follow.

## 2017-04-26 NOTE — PROGRESS NOTE ADULT - SUBJECTIVE AND OBJECTIVE BOX
Patient is a 56y old  Female who presents with a chief complaint of Abdominal pain, nausea and vomiting. (24 Apr 2017 13:35)    HPI:  Pt seen and examined, NAD, pain resolved, no fever, passing gas, had multiple BMs, no nausea, tolerating FLD.    Vital Signs Last 24 Hrs  T(C): 36.9, Max: 36.9 (04-25 @ 13:13)  T(F): 98.5, Max: 98.5 (04-26 @ 05:59)  HR: 97 (97 - 102)  BP: 98/59 (84/33 - 105/52)  BP(mean): --  RR: 18 (17 - 18)  SpO2: 93% (93% - 93%)      I&O's Summary    I & Os for current day (as of 26 Apr 2017 13:10)  =============================================  IN: 200 ml / OUT: 0 ml / NET: 200 ml      PHYSICAL EXAM:      Constitutional: NAD    General:  AOx3    Respiratory:  CTABL    Cardiovascular: S1+S2+0    Gastrointestinal : Abdomen soft, mildly distended, NT, palpable liver.    Extremities: NV intact    Neurological: No focal deficit.          Labs:                          10.4   8.6   )-----------( 138      ( 26 Apr 2017 05:45 )             33.0       04-26    134<L>  |  98  |  16  ----------------------------<  101<H>  3.3<L>   |  24  |  0.81    Ca    6.4<LL>      26 Apr 2017 05:45  Mg     1.7     04-26    TPro  5.1<L>  /  Alb  1.9<L>  /  TBili  1.6<H>  /  DBili  1.1<H>  /  AST  179<H>  /  ALT  72  /  AlkPhos  339<H>  04-26

## 2017-04-26 NOTE — PROGRESS NOTE ADULT - SUBJECTIVE AND OBJECTIVE BOX
Patient is a 56y old  Female who presents with a chief complaint of Abdominal pain, nausea and vomiting. (24 Apr 2017 13:35)      HPI:  This is a 55 y/o female with PMH of omphalocele s/p multiple abdominal surgeries as a child, metastatic cervical cancer with extensive bone mets (diagnosed in 8/16) s/p surgery for spinal stabilization s/p XRT to her pelvis and chemotherapy (cisplatin and taxol). Patient last completed chemotherapy in Jan 2017 and has dealt with chronic constipation issues. She reports having weakness and persistent nausea and vomiting. In the ED, patient notably febrile, lactate 3.3. CT abd / pelvis revealed early small bowel obstruction with large liver mass concerning for visceral metastic disease with enlarged liver (new for patient). She was given IV Zosyn and Vanc empirically, seen by surgery with NG tube placed.         abd pain improved and tole liquids  neg cp or sob  pos BM    mild RUQ discomfort  Patient very weak with mild abdominal pain having some relief with NGT. No CP or SOB. Fever resolved.   ROS: stated above.       PAST MEDICAL & SURGICAL HISTORY:  cervical cancer as above with bone mets  lumbar spine stabilization for pathological fracture  mediport  Hernia repair  abdominal wall defect, congenital.  Omphacele    Social Hx: lives with , former smoker quit > 1 year ago. No illicit drug use.   Family Hx: unknown  NKDA    Vital Signs Last 24 Hrs  T(C): 36.4, Max: 38.7 (04-23 @ 21:00)  T(F): 97.6, Max: 101.7 (04-23 @ 21:00)  HR: 86 (86 - 117)  BP: 104/61 (104/61 - 123/72)  BP(mean): --  RR: 20 (16 - 20)  SpO2: 98% (93% - 100%)    PHYSICAL EXAM:  Constitutional: middle aged female weak appearing in mild distress. Awake.   HEENT: PERR, EOMI, Normal Hearing, NGT in place, dark brown feculent material in suction bottle  Neck: Soft and supple, No LAD, No JVD  Respiratory: Breath sounds are clear bilaterally, No wheezing, rales or rhonchi  Cardiovascular: S1 and S2, regular rate and rhythm, no Murmurs, gallops or rubs  Gastrointestinal: Bowel Sounds present however with notable distention. Nontender to light palpation. Enlarged liver. No guarding, no rebound  Extremities: No peripheral edema  Vascular: 2+ peripheral pulses  Neurological: A/O x 3, no focal deficits  Musculoskeletal: 5/5 strength b/l upper and lower extremities  Skin: No rashes      MEDICATIONS  (STANDING):  piperacillin/tazobactam IVPB. 3.375Gram(s) IV Intermittent every 8 hours  dextrose 5% + sodium chloride 0.45%. 1000milliLiter(s) IV Continuous <Continuous>      LABS: All Labs Reviewed:                        11.4   10.9  )-----------( 160      ( 23 Apr 2017 21:13 )             32.9     04-23    127<L>  |  90<L>  |  17  ----------------------------<  132<H>  3.7   |  24  |  0.73    Ca    7.3<L>      23 Apr 2017 20:13    TPro  6.7  /  Alb  2.4<L>  /  TBili  2.5<H>  /  DBili  x   /  AST  203<H>  /  ALT  84<H>  /  AlkPhos  420<H>  04-23    PT/INR - ( 23 Apr 2017 20:13 )   PT: 16.9 sec;   INR: 1.55 ratio    PTT - ( 23 Apr 2017 20:13 )  PTT:32.5 sec  CARDIAC MARKERS trops < 0.01 X 3.       Blood Culture: pending    Radiology:   CT Abd/Pelvis IMPRESSION:  Stomach and proximal small bowel loops are decompressed.  There are   dilated loops of mid small bowel measuring up to 4.5 cm multiple   air-fluid levels.  The distal small bowel is collapsed.  The colon is   underdistended.  Findings are suspicious for an early or low-grade bowel   obstruction.  Discrete transition point is not visualized.  The patient   would likely benefit from nasogastric tube placement.  If symptoms   persist or worsen, a follow-up CT with oral contrast can be performed.    There are two ventral abdominal wall hernias with nonobstructed distal   small bowel loop seen inside the rectus sheath; this does not contribute   to the patient's bowel obstruction.       Interval progression of masslike hypertrophy of the left hepatic lobe   which now measures 9 x 19 x 19 cm compared to 8 x14 x 16.5 cm   08/02/2016.  Underlying cirrhosis should be considered.  MRI can be   performed to exclude hepatocellular carcinoma.  Liver biopsy can be   performed as clinically warranted.    Progression of osseous metastatic disease throughout the visualized   spine, ribs and bony pelvis since 08/02/2014.    US Abd: Gallbladder sludge.  No evidence of gallstones, cholecystitis or biliary   duct ductal dilatation. (24 Apr 2017 13:35)      PAST MEDICAL & SURGICAL HISTORY:  Spinal surgery in prior 3 months: August 2016  Personal history of spine surgery: August 2016      MEDICATIONS  (STANDING):  dextrose 5% + sodium chloride 0.45%. 1000milliLiter(s) IV Continuous <Continuous>  dexamethasone  Injectable 4milliGRAM(s) IV Push daily  pantoprazole  Injectable 40milliGRAM(s) IV Push daily  magnesium sulfate  IVPB 2Gram(s) IV Intermittent once  potassium chloride    Tablet ER 40milliEquivalent(s) Oral once    MEDICATIONS  (PRN):  morphine  - Injectable 2milliGRAM(s) IV Push every 4 hours PRN Moderate Pain (4 - 6)  morphine  - Injectable 4milliGRAM(s) IV Push every 4 hours PRN Severe Pain (7 - 10)  acetaminophen   Tablet 650milliGRAM(s) Oral every 6 hours PRN For Temp greater than 38 C (100.4 F)  acetaminophen   Tablet. 650milliGRAM(s) Oral every 6 hours PRN Mild Pain (1 - 3)  ondansetron Injectable 4milliGRAM(s) IV Push every 4 hours PRN Nausea and/or Vomiting  diphenhydrAMINE   Injectable 25milliGRAM(s) IV Push every 6 hours PRN Rash and/or Itching      Allergies    No Known Allergies    Intolerances        SOCIAL HISTORY:as above    FAMILY HISTORY:NC      REVIEW OF SYSTEMS:    CONSTITUTIONAL: No weakness, fevers or chills  EYES/ENT: No visual changes;  No vertigo or throat pain   NECK: No pain or stiffness  RESPIRATORY: No cough, wheezing, hemoptysis; No shortness of breath  CARDIOVASCULAR: No chest pain or palpitations  GENITOURINARY: No dysuria, frequency or hematuria  NEUROLOGICAL: No numbness or weakness  SKIN: No itching, burning, rashes, or lesions   All other review of systems is negative unless indicated above.    Vital Signs Last 24 Hrs  T(C): 36.9, Max: 36.9 (04-25 @ 13:13)  T(F): 98.5, Max: 98.5 (04-26 @ 05:59)  HR: 97 (97 - 102)  BP: 98/59 (84/33 - 105/52)  BP(mean): --  RR: 18 (17 - 18)  SpO2: 93% (93% - 93%)    PHYSICAL EXAM:    Constitutional: NAD, well-developed  HEENT: EOMI, throat clear  Neck: No LAD, supple  Respiratory: CTA and P  Cardiovascular: S1 and S2, RRR, no M  Gastrointestinal: BS+, soft, NT/ND, neg HSM,  Extremities: No peripheral edema, neg clubing, cyanosis  Vascular: 2+ peripheral pulses  Neurological: A/O x 3, no focal deficits  Psychiatric: Normal mood, normal affect  Skin: No rashes    LABS:  CBC Full  -  ( 26 Apr 2017 05:45 )  WBC Count : 8.6 K/uL  Hemoglobin : 10.4 g/dL  Hematocrit : 33.0 %  Platelet Count - Automated : 138 K/uL  Mean Cell Volume : 86.7 fl  Mean Cell Hemoglobin : 27.2 pg  Mean Cell Hemoglobin Concentration : 31.4 gm/dL  Auto Neutrophil # : 7.6 K/uL  Auto Lymphocyte # : 0.2 K/uL  Auto Monocyte # : 0.7 K/uL  Auto Eosinophil # : 0.0 K/uL  Auto Basophil # : 0.1 K/uL  Auto Neutrophil % : 88.3 %  Auto Lymphocyte % : 2.8 %  Auto Monocyte % : 7.8 %  Auto Eosinophil % : 0.4 %  Auto Basophil % : 0.7 %    04-26    134<L>  |  98  |  16  ----------------------------<  101<H>  3.3<L>   |  24  |  0.81    Ca    6.4<LL>      26 Apr 2017 05:45  Mg     1.7     04-26    TPro  5.1<L>  /  Alb  1.9<L>  /  TBili  1.6<H>  /  DBili  1.1<H>  /  AST  179<H>  /  ALT  72  /  AlkPhos  339<H>  04-26            RADIOLOGY & ADDITIONAL STUDIES:EXAM:  MRI ABDOMEN W WO CONTRAST                            PROCEDURE DATE:  04/24/2017        INTERPRETATION:  MRI ABDOMEN W WO CONTRAST    HISTORY:  cervical cancer progressive hepatomegaly,  rule out cirrhosis   vs mets    TECHNIQUE: MultiplanarT1-weighted, T2-weighted, and diffusion weighted   sequences were obtained of the abdomen, including dynamic post-contrast   T1-weighted sequences.         Field Strength: 1.5T    Contrast: 7 cc Gadavist.    COMPARISON: CT abdomen and pelvis 4/24/2017.    FINDINGS:    LIVER: There is marked enlargement of the left lobe of the liver. There   is suggestion of multiple masses within the left lobe, which appear   atypical for metastatic lesions. They demonstrate mildly increased T2   signal in comparison to the background liver on precontrast images. There   is heterogeneous arterial enhancement and isointensity to the background   liver on delayed phase images. A lesion in the far lateral segment shows   suggestion of a T2 hyperintense central scar with delayed enhancement.   The hepatic vessels that course through these lesions do not appear   significantly displaced. These could possibly represent multiple areas of   focal nodular hyperplasia localized to the left lobe of the liver.    SPLEEN: Normal.  PANCREAS: Suboptimal visualization.  GALLBLADDER/BILIARY TREE: Nondilated. Normal gallbladder.  ADRENALS: Normal.  KIDNEYS: Symmetric nephrograms. No hydronephrosis.  LYMPHADENOPATHY/RETROPERITONEUM: No adenopathy.  VASCULATURE: Normal caliber aorta.  BOWEL: Limited visualization is unremarkable.  PERITONEUM/ABDOMINAL WALL: No ascites.  SKELETAL: Diffuse spinal fusion hardware is present. Partially visualized   metastatic bone lesions in the pelvis.  LUNG BASES: No effusion.    IMPRESSION:    Marked enlargement of the left lobe of the liver with suggestion of   multiple underlying masses, which appear atypical for metastatic lesions.   Imaging findings are more suggestive of multiple areas of focal nodular   hyperplasia localizedto the left lobe of the liver. Further noninvasive   testing can be performed utilizing hepatocyte-specific contrast agent   (Eovist) on follow up MRI. Otherwise, biopsy of the left lobe should   yield the correct diagnosis.              JESSI DURAND   This document has been electronically signed. Apr 24 2017  5:03PM

## 2017-04-26 NOTE — PROGRESS NOTE ADULT - ASSESSMENT
This is a 56 y.o F PMH of prior abdominal surgeries as a child with metastatic cervical cancer to bone presents with abdominal pain, nausea and vomiting concerning for early small bowel obstruction and likely metastatic disease to the liver:     # Small Bowel Obstruction - 2/2 malignancy and complications, now resolved.   # Nausea / Vomiting - resolved.   # Acute Abdominal Pain - improving.   - NGT X 1 day, pulled out on 4/25.   - NPO for specialized MRI abd with EOVIST to better delineate if hepatomegaly is consistent with malignancy. If results inconclusive will pursue IV liver biopsy.   - IV decadron added to aid in decreasing intestinal inflammation.   - cont IVFs, pain meds, NPO.   - GYN Onc Dr. Huang aware of patient's hospitalization, no gyn onc surgery planned and defers to general surgery for management of SBO.   - off IV Zosyn X 2 days. No signs of infection.     # Liver Lesion - concerning for malignancy (new).   - discussed with Onc and GI today, see above.     # Hypomagnesium  # Hypokalemia  - replete with Mag rider and PO K.     # Hypocalcemia - corrected Serum Ca 8.1, check ionized Luke in AM.        # Metastatic Cervical Cancer - known bony mets now progressed and hepatomegaly concerning for visceral involvement.   - see above.   - Heme following, last Chemo in Jan 2017.   - Consult Palliative Care for additional support, GOC as patient will likely be a hospice candidate if no further treatment available.       DVT ppx: Venodynes, hold off on pharmacological ppx in case surgery needed.   Diet: NPO  Dispo: remain inpatient for MRI.     Case discussed with team, Onc,  Palliative, Heme.

## 2017-04-26 NOTE — PROGRESS NOTE ADULT - SUBJECTIVE AND OBJECTIVE BOX
Patient is a 56y old  Female who presents with a chief complaint of abdominal pain nausea and vomiting with constipation    HPI:  56 yr old female who was diagnosed with metastatic  cervical cancer with extensive bone mets in 8/16. she required surgery for spinal stabilization and the path was squamous cell ca with positive p16 consistent with cervical cancer. subsequently a large cervical/uterine mass obstructing the ureters was also noted.    she received RT to her spine and also received chemotherapy with 6 cycles of cisplatin and taxol with radiation therapy to her pelvis.    last bone scan in 2/16 showed widespread bone mets but the patient was asymptomatic and no obvious destructive lesions were seen and she is currently not on chemotherapy but is receiving xgeva for bone stabilization. and enlarged left lobe of liver with normal LFTs were also noted    she is awaiting a knee replacement for severe OA.    now presents with abdominal pain, Vomiting and no BM for 1 week    found to have SBO , abnormal LFTs and further enlargement of the left lobe of the liver.    NGT removed and nausea and vomiting resolved.    case discussed extensively at the tumor board and with IR.    left lobe enlargement is most likely due to focal hyperplasia and not malignancy but the clinical course of rapid enlargement and abnormal LFTs unusual.    MRI scan with EOVIST shall most likely answer the question, if not liver biopsy is indicated.        PAST MEDICAL & SURGICAL HISTORY:  cervical cancer as above with bone mets  lumbar spine stabilization for pathological fracture  mediport  Hernia repair  abdominal wall defect, congenital.    REVIEW OF SYSTEMS    General:	The patient feels well today.  no unexpected weight loss. Appetite poor . no night sweats.  Skin: no Rash.	  ENT: no sore throat or oral pain. no difficulty with swallowing  Respiratory: No chest pain, No shortness of breath, no hemoptysis, no cough, no chest pain.  Cardiovascular : No chest pain. no palpitation.  Gastrointestinal: abdominal pain, vomiting, constipation.  Genitourinary: No hematuria , no dysuria	  Musculoskeletal: No myalgia, no arthralgia, no back pain, no joint swelling  Neurological: no headaches, no dizzyness, no weakness, no double vision. 	  Psychiatric: no change in mood. 	  Hematology/Lymphatics: + weakness. 	  Endocrine:	no burning in urine or frequency  Allergic/Immunologic:	 no fever.     Allergies    No Known Allergies    Intolerances        Occupation:  Alochol:+ Smoking: Denied Drug Use: Denied  Marital Status:         FAMILY HISTORY:      MEDICATIONS  (STANDING):  dextrose 5% + sodium chloride 0.45%. 1000milliLiter(s) IV Continuous <Continuous>  dexamethasone  Injectable 4milliGRAM(s) IV Push daily  pantoprazole  Injectable 40milliGRAM(s) IV Push daily  magnesium sulfate  IVPB 2Gram(s) IV Intermittent once  potassium chloride    Tablet ER 40milliEquivalent(s) Oral once      Exam:    Vital Signs Last 24 Hrs  T(C): 36.9, Max: 36.9 (04-25 @ 13:13)  T(F): 98.5, Max: 98.5 (04-26 @ 05:59)  HR: 97 (97 - 102)  BP: 98/59 (84/33 - 105/52)  BP(mean): --  RR: 18 (17 - 18)  SpO2: 93% (93% - 93%)        Gen: appears comfortable. anicteric  HEENT: normocephalic/atraumatic, no conjunctival pallor, no scleral icterus, no oral thrush/mucosal bleeding/mucositis  Neck: supple, no masses, no JVD  Breasts: deferred  Back: nontender  Cardiovascular: heart sounds Normal S1S2, no murmurs/rubs/gallops  Respiratory: air entry normal and equal on both sides. no wheeze, no ronchi,   Gastrointestinal: large liver palpable to below the umbilicaus. no evidence for ascites. BS+  Genitourinary: deferred  Rectal: deferred  Extremities: no clubbing/cyanosis, no edema, no calf tenderness  Neurological:  Alert oriented X3 cranial nerves normal. no focal deficits  Skin: no rash on visible skin  Lymph Nodes:  no cervical/supraclavicular LAD, no axillary/groin LAD  Musculoskeletal:  full ROM  Psychiatric:  mood appears normal. anxious.        LABS:                                   10.4   8.6   )-----------( 138      ( 26 Apr 2017 05:45 )             33.0     04-26    134<L>  |  98  |  16  ----------------------------<  101<H>  3.3<L>   |  24  |  0.81    Ca    6.4<LL>      26 Apr 2017 05:45  Mg     1.7     04-26    TPro  5.1<L>  /  Alb  1.9<L>  /  TBili  1.6<H>  /  DBili  1.1<H>  /  AST  179<H>  /  ALT  72  /  AlkPhos  339<H>  04-26    LIVER FUNCTIONS - ( 26 Apr 2017 05:45 )  Alb: 1.9 g/dL / Pro: 5.1 gm/dL / ALK PHOS: 339 U/L / ALT: 72 U/L / AST: 179 U/L / GGT: x                 RADIOLOGY & ADDITIONAL STUDIES:    MRI with Tod:    LIVER: There is marked enlargement of the left lobe of the liver. There   is suggestion of multiple masses within the left lobe, which appear   atypical for metastatic lesions. They demonstrate mildly increased T2   signal in comparison to the background liver on precontrast images. There   is heterogeneous arterial enhancement and isointensity to the background   liver on delayed phase images. A lesion in the far lateral segment shows   suggestion of a T2 hyperintense central scar with delayed enhancement.   The hepatic vessels that course through these lesions do not appear   significantly displaced. These could possibly represent multiple areas of   focal nodular hyperplasia localized to the left lobe of the liver.    SPLEEN: Normal.  PANCREAS: Suboptimal visualization.  GALLBLADDER/BILIARY TREE: Nondilated. Normal gallbladder.  ADRENALS: Normal.  KIDNEYS: Symmetric nephrograms. No hydronephrosis.  LYMPHADENOPATHY/RETROPERITONEUM: No adenopathy.  VASCULATURE: Normal caliber aorta.  BOWEL: Limited visualization is unremarkable.  PERITONEUM/ABDOMINAL WALL: No ascites.  SKELETAL: Diffuse spinal fusion hardware is present. Partially visualized   metastatic bone lesions in the pelvis.  LUNG BASES: No effusion.    IMPRESSION:    Marked enlargement of the left lobe of the liver with suggestion of   multiple underlying masses, which appear atypical for metastatic lesions.   Imaging findings are more suggestive of multiple areas of focal nodular   hyperplasia localized to the left lobe of the liver. Further noninvasive   testing can be performed utilizing hepatocyte-specific contrast agent   (Eovist) on follow up MRI. Otherwise, biopsy of the left lobe should   yield the correct diagnosis.

## 2017-04-26 NOTE — GOALS OF CARE CONVERSATION - PERSONAL ADVANCE DIRECTIVE - CONVERSATION DETAILS
Discussed cancer diagnosis with pt and family, as well as complications of SBO. She understands this as well as current plan to further workup liver involvement. Pt still with good functional status, hopeful to get home. Understanding of need for this information to formulate treatment plan and lay our risks/benefits so they can make a meaningful decision. Pt and family on board with this, hoping for the best, agreeable to home pall in the interim.

## 2017-04-26 NOTE — PROGRESS NOTE ADULT - ASSESSMENT
56 y old female with 56 y old female with Cervical CA, resolved SBO VS ileus.    Pain control  Diet as tolerated  No surgical intervention  Care per primary service.

## 2017-04-26 NOTE — CHART NOTE - NSCHARTNOTEFT_GEN_A_CORE
Hospitalist Update Note    Reviewed MRI Abdomin findings indicative of increased hepatocellular uptake of EOVIST contrast more so in line with nodular hyperplasia.   Discussed with Dr. Coombs.      Plan:   - cancel IR biopsy of liver  - abd pain improved, + BM, will advance diet to regular. Seen by surgery  - PT consult and dispo planning. Will f/u outpatient for further chemo options.   - discussed with patient who is relieved.

## 2017-04-26 NOTE — PROGRESS NOTE ADULT - PROBLEM SELECTOR PLAN 3
case discussed extensively at the tumor board and with IR.    left lobe enlargement is most likely due to focal hyperplasia and not malignancy but the clinical course of rapid enlargement and abnormal LFTs unusual.    MRI scan with EOVIST shall most likely answer the question, if not liver biopsy is indicated.

## 2017-04-26 NOTE — PROGRESS NOTE ADULT - ASSESSMENT
SBO, possible partial  improving    advance diet      Liver lesion   DW oncology  possible met and if management will be changed, would consider bx  DW pt and hospitalist      PPI  follow HCT

## 2017-04-26 NOTE — PROGRESS NOTE ADULT - SUBJECTIVE AND OBJECTIVE BOX
CC: abdominal pain.     HPI: This is a 57 y/o female with PMH of omphalocele s/p multiple abdominal surgeries as a child, metastatic cervical cancer with extensive bone mets (diagnosed in 8/16) s/p surgery for spinal stabilization s/p XRT to her pelvis and chemotherapy (cisplatin and taxol). Patient last completed chemotherapy in Jan 2017 and has dealt with chronic constipation issues. She reports having weakness and persistent nausea and vomiting. In the ED, patient notably febrile, lactate 3.3. CT abd / pelvis revealed early small bowel obstruction with large liver mass concerning for visceral metastic disease with enlarged liver (new for patient). She was given IV Zosyn and Vanc empirically, seen by surgery with NG tube placed.     4/26/17: Patient with less abd pain, + normal BM this morning. No CP. + weakness. For specialized MRI abd to decipher liver lesion, if inconclusive will pursue IR liver biopsy.     ROS: stated above.     Vital Signs Last 24 Hrs  T(C): 36.9, Max: 36.9 (04-25 @ 13:13)  T(F): 98.5, Max: 98.5 (04-26 @ 05:59)  HR: 97 (97 - 102)  BP: 98/59 (84/33 - 105/52)  BP(mean): --  RR: 18 (17 - 18)  SpO2: 93% (93% - 93%)    PHYSICAL EXAM:  Constitutional: middle aged female weak but less ill appearing today.    HEENT: PERR, EOMI, Normal Hearing, Nares patent, NGT out.   Neck: Soft and supple, No LAD, No JVD  Respiratory: Breath sounds are clear bilaterally, No wheezing, rales or rhonchi  Cardiovascular: S1 and S2, regular rate and rhythm, no Murmurs, gallops or rubs  Gastrointestinal: Bowel Sounds present however with notable distention slightly less today. Nontender to light palpation. Enlarged liver. No guarding, no rebound  Extremities: No peripheral edema  Vascular: 2+ peripheral pulses  Neurological: A/O x 3, no focal deficits  Musculoskeletal: 5/5 strength b/l upper and lower extremities  Skin: No rashes    MEDICATIONS  (STANDING):  dextrose 5% + sodium chloride 0.45%. 1000milliLiter(s) IV Continuous <Continuous>  dexamethasone  Injectable 4milliGRAM(s) IV Push daily  pantoprazole  Injectable 40milliGRAM(s) IV Push daily  potassium chloride    Tablet ER 40milliEquivalent(s) Oral once        LABS: All Labs Reviewed:                        10.4   8.6   )-----------( 138      ( 26 Apr 2017 05:45 )             33.0     04-26    134<L>  |  98  |  16  ----------------------------<  101<H>  3.3<L>   |  24  |  0.81    Ca    6.4<LL>      26 Apr 2017 05:45  Mg     1.7     04-26    TPro  5.1<L>  /  Alb  1.9<L>  /  TBili  1.6<H>  /  DBili  1.1<H>  /  AST  179<H>  /  ALT  72  /  AlkPhos  339<H>  04-26     Neg trops X 3.       Culture - Blood (04.23.17 @ 21:13)    Specimen Source: .Blood None    Culture Results:   No growth to date.        MRI ABDOMEN:   IMPRESSION:    Marked enlargement of the left lobe of the liver with suggestion of   multiple underlying masses, which appear atypical for metastatic lesions.   Imaging findings are more suggestive of multiple areas of focal nodular   hyperplasia localized to the left lobe of the liver. Further noninvasive   testing can be performed utilizing hepatocyte-specific contrast agent   (Eovist) on follow up MRI. Otherwise, biopsy of the left lobe should   yield the correct diagnosis.

## 2017-04-26 NOTE — PROVIDER CONTACT NOTE (OTHER) - SITUATION
Patient's BP manually was 84/33
Patient's latest serum calcium result is 6.8
called Dr. COSTA - may have been called already - but just making sure -
spoke with gyn/onc CHANDRIKA Bob regarding patient/ made aware of consult
spoke with office regarding consult

## 2017-04-26 NOTE — PROGRESS NOTE ADULT - SUBJECTIVE AND OBJECTIVE BOX
HPI: Pt seen and examined this am. Pt s/p NGT removal yesterday, notes tolerating clears without issue. Denies abd pain, n/v today. Voiding well also.    Met with pt and family this am, see [facility] note for details.      PAIN: no    DYSPNEA: no      ROS:    Anxiety- denies  Physical Discomfort  Dyspnea  Constipation  Diarrhea  Nausea  Vomiting  Anorexia  Cough  Secretions  Fatigue  Weakness    All other systems reviewed and negative    HPI:      PAIN:  Location  Intensity  Quality  Aggravating Factors  Alleviating Factors  Timing    DYSPNEA:      ROS:    Review of Systems:    Anxiety- denies  Physical Discomfort- mild  Constipation- denies, last bm in ED no issues  Diarrhea- denies  Nausea/Vomiting- denies  Anorexia- endorses hunger    All other systems reviewed and negative    PHYSICAL EXAM:    Vital Signs Last 24 Hrs  T(C): 36.9, Max: 36.9 ( @ 13:13)  T(F): 98.5, Max: 98.5 ( @ 05:59)  HR: 97 (97 - 102)  BP: 98/59 (84/33 - 105/52)  BP(mean): --  RR: 18 (17 - 18)  SpO2: 93% (93% - 93%)  Daily     Daily Weight in k.6 (2017 12:25)    PPSV2: 40  %  FAST:    General: Middle aged female, lying in bed, NAD, flat affect, pleasant  Mental Status: aOx3  HEENT: dmm, perrl, eomi  Lungs: dec at bases bl  Cardiac: +s1 s2 rrr  GI: +bs, soft, nt, healed surgical scar, palpable liver below costal angle  Ext: scant pedal edema  Neuro: no focal deficits    LABS:                        10.4   8.6   )-----------( 138      ( 2017 05:45 )             33.0         134<L>  |  98  |  16  ----------------------------<  101<H>  3.3<L>   |  24  |  0.81    Ca    6.4<LL>      2017 05:45  Mg     1.7         TPro  5.1<L>  /  Alb  1.9<L>  /  TBili  1.6<H>  /  DBili  1.1<H>  /  AST  179<H>  /  ALT  72  /  AlkPhos  339<H>        Albumin: Albumin, Serum: 1.9 g/dL ( @ 05:45)      Allergies    No Known Allergies    Intolerances      MEDICATIONS  (STANDING):  dextrose 5% + sodium chloride 0.45%. 1000milliLiter(s) IV Continuous <Continuous>  dexamethasone  Injectable 4milliGRAM(s) IV Push daily  pantoprazole  Injectable 40milliGRAM(s) IV Push daily  magnesium sulfate  IVPB 2Gram(s) IV Intermittent once  potassium chloride    Tablet ER 40milliEquivalent(s) Oral once    MEDICATIONS  (PRN):  morphine  - Injectable 2milliGRAM(s) IV Push every 4 hours PRN Moderate Pain (4 - 6)  morphine  - Injectable 4milliGRAM(s) IV Push every 4 hours PRN Severe Pain (7 - 10)  acetaminophen   Tablet 650milliGRAM(s) Oral every 6 hours PRN For Temp greater than 38 C (100.4 F)  acetaminophen   Tablet. 650milliGRAM(s) Oral every 6 hours PRN Mild Pain (1 - 3)  ondansetron Injectable 4milliGRAM(s) IV Push every 4 hours PRN Nausea and/or Vomiting  diphenhydrAMINE   Injectable 25milliGRAM(s) IV Push every 6 hours PRN Rash and/or Itching      RADIOLOGY:    PHYSICAL EXAM:    Vital Signs Last 24 Hrs  T(C): 36.9, Max: 36.9 ( @ 13:13)  T(F): 98.5, Max: 98.5 ( @ 05:59)  HR: 97 (97 - 102)  BP: 98/59 (84/33 - 105/52)  BP(mean): --  RR: 18 (17 - 18)  SpO2: 93% (93% - 93%)  Daily     Daily Weight in k.6 (2017 12:25)    General:  HEENT:  Lungs:  Cardiac:  GI:  :  Ext:  Neuro:      LABS:                        10.4   8.6   )-----------( 138      ( 2017 05:45 )             33.0         134<L>  |  98  |  16  ----------------------------<  101<H>  3.3<L>   |  24  |  0.81    Ca    6.4<LL>      2017 05:45  Mg     1.7         TPro  5.1<L>  /  Alb  1.9<L>  /  TBili  1.6<H>  /  DBili  1.1<H>  /  AST  179<H>  /  ALT  72  /  AlkPhos  339<H>        Albumin: Albumin, Serum: 1.9 g/dL ( @ 05:45)      Allergies    No Known Allergies    Intolerances      MEDICATIONS  (STANDING):  dextrose 5% + sodium chloride 0.45%. 1000milliLiter(s) IV Continuous <Continuous>  dexamethasone  Injectable 4milliGRAM(s) IV Push daily  pantoprazole  Injectable 40milliGRAM(s) IV Push daily  magnesium sulfate  IVPB 2Gram(s) IV Intermittent once  potassium chloride    Tablet ER 40milliEquivalent(s) Oral once    MEDICATIONS  (PRN):  morphine  - Injectable 2milliGRAM(s) IV Push every 4 hours PRN Moderate Pain (4 - 6)  morphine  - Injectable 4milliGRAM(s) IV Push every 4 hours PRN Severe Pain (7 - 10)  acetaminophen   Tablet 650milliGRAM(s) Oral every 6 hours PRN For Temp greater than 38 C (100.4 F)  acetaminophen   Tablet. 650milliGRAM(s) Oral every 6 hours PRN Mild Pain (1 - 3)  ondansetron Injectable 4milliGRAM(s) IV Push every 4 hours PRN Nausea and/or Vomiting  diphenhydrAMINE   Injectable 25milliGRAM(s) IV Push every 6 hours PRN Rash and/or Itching      RADIOLOGY: CC: n/v   HPI: Pt seen and examined this am. Pt s/p NGT removal yesterday, notes tolerating clears without issue. Denies abd pain, n/v today. Voiding well also.    Met with pt and family this am, see GOC note for details.      PAIN: no    DYSPNEA: no      ROS:    Anxiety- denies  Physical Discomfort  Dyspnea  Constipation  Diarrhea  Nausea  Vomiting  Anorexia  Cough  Secretions  Fatigue  Weakness    All other systems reviewed and negative    HPI:      PAIN:  Location  Intensity  Quality  Aggravating Factors  Alleviating Factors  Timing    DYSPNEA:      ROS:    Review of Systems:    Anxiety- denies  Physical Discomfort- mild  Constipation- denies, last bm in ED no issues  Diarrhea- denies  Nausea/Vomiting- denies  Anorexia- endorses hunger    All other systems reviewed and negative    PHYSICAL EXAM:    Vital Signs Last 24 Hrs  T(C): 36.9, Max: 36.9 ( @ 13:13)  T(F): 98.5, Max: 98.5 ( @ 05:59)  HR: 97 (97 - 102)  BP: 98/59 (84/33 - 105/52)  BP(mean): --  RR: 18 (17 - 18)  SpO2: 93% (93% - 93%)  Daily     Daily Weight in k.6 (2017 12:25)    PPSV2: 40  %  FAST:    General: Middle aged female, lying in bed, NAD, flat affect, pleasant  Mental Status: aOx3  HEENT: dmm, perrl, eomi  Lungs: dec at bases bl  Cardiac: +s1 s2 rrr  GI: +bs, soft, nt, healed surgical scar, palpable liver below costal angle  Ext: scant pedal edema  Neuro: no focal deficits    LABS:                        10.4   8.6   )-----------( 138      ( 2017 05:45 )             33.0         134<L>  |  98  |  16  ----------------------------<  101<H>  3.3<L>   |  24  |  0.81    Ca    6.4<LL>      2017 05:45  Mg     1.7         TPro  5.1<L>  /  Alb  1.9<L>  /  TBili  1.6<H>  /  DBili  1.1<H>  /  AST  179<H>  /  ALT  72  /  AlkPhos  339<H>        Albumin: Albumin, Serum: 1.9 g/dL ( @ 05:45)      Allergies    No Known Allergies    Intolerances      MEDICATIONS  (STANDING):  dextrose 5% + sodium chloride 0.45%. 1000milliLiter(s) IV Continuous <Continuous>  dexamethasone  Injectable 4milliGRAM(s) IV Push daily  pantoprazole  Injectable 40milliGRAM(s) IV Push daily  magnesium sulfate  IVPB 2Gram(s) IV Intermittent once  potassium chloride    Tablet ER 40milliEquivalent(s) Oral once    MEDICATIONS  (PRN):  morphine  - Injectable 2milliGRAM(s) IV Push every 4 hours PRN Moderate Pain (4 - 6)  morphine  - Injectable 4milliGRAM(s) IV Push every 4 hours PRN Severe Pain (7 - 10)  acetaminophen   Tablet 650milliGRAM(s) Oral every 6 hours PRN For Temp greater than 38 C (100.4 F)  acetaminophen   Tablet. 650milliGRAM(s) Oral every 6 hours PRN Mild Pain (1 - 3)  ondansetron Injectable 4milliGRAM(s) IV Push every 4 hours PRN Nausea and/or Vomiting  diphenhydrAMINE   Injectable 25milliGRAM(s) IV Push every 6 hours PRN Rash and/or Itching      RADIOLOGY:    PHYSICAL EXAM:    Vital Signs Last 24 Hrs  T(C): 36.9, Max: 36.9 ( @ 13:13)  T(F): 98.5, Max: 98.5 ( @ 05:59)  HR: 97 (97 - 102)  BP: 98/59 (84/33 - 105/52)  BP(mean): --  RR: 18 (17 - 18)  SpO2: 93% (93% - 93%)  Daily     Daily Weight in k.6 (2017 12:25)    General:  HEENT:  Lungs:  Cardiac:  GI:  :  Ext:  Neuro:      LABS:                        10.4   8.6   )-----------( 138      ( 2017 05:45 )             33.0         134<L>  |  98  |  16  ----------------------------<  101<H>  3.3<L>   |  24  |  0.81    Ca    6.4<LL>      2017 05:45  Mg     1.7         TPro  5.1<L>  /  Alb  1.9<L>  /  TBili  1.6<H>  /  DBili  1.1<H>  /  AST  179<H>  /  ALT  72  /  AlkPhos  339<H>        Albumin: Albumin, Serum: 1.9 g/dL ( @ 05:45)      Allergies    No Known Allergies    Intolerances      MEDICATIONS  (STANDING):  dextrose 5% + sodium chloride 0.45%. 1000milliLiter(s) IV Continuous <Continuous>  dexamethasone  Injectable 4milliGRAM(s) IV Push daily  pantoprazole  Injectable 40milliGRAM(s) IV Push daily  magnesium sulfate  IVPB 2Gram(s) IV Intermittent once  potassium chloride    Tablet ER 40milliEquivalent(s) Oral once    MEDICATIONS  (PRN):  morphine  - Injectable 2milliGRAM(s) IV Push every 4 hours PRN Moderate Pain (4 - 6)  morphine  - Injectable 4milliGRAM(s) IV Push every 4 hours PRN Severe Pain (7 - 10)  acetaminophen   Tablet 650milliGRAM(s) Oral every 6 hours PRN For Temp greater than 38 C (100.4 F)  acetaminophen   Tablet. 650milliGRAM(s) Oral every 6 hours PRN Mild Pain (1 - 3)  ondansetron Injectable 4milliGRAM(s) IV Push every 4 hours PRN Nausea and/or Vomiting  diphenhydrAMINE   Injectable 25milliGRAM(s) IV Push every 6 hours PRN Rash and/or Itching      RADIOLOGY:

## 2017-04-26 NOTE — PROGRESS NOTE ADULT - ASSESSMENT
56 yr old female w/ PMHx extensive abd surgery as a child (?omphaloceole), severe knee OA (awaiting replacement) recently diagnosed with metastatic cervical cancer with extensive bone mets in 8/16, after surgery for spinal stabilization, subsequently found to have a large cervical/uterine mass obstructing the ureters. Now s/p RT to spine, chemotherapy with 6 cycles of cisplatin and taxol + RT to pelvis. Of note, last bone scan in 2/16 showed widespread bone mets but asx,  and enlarged left lobe of liver with normal LFTs were also noted. No longer on chemo but is receiving xgeva for bone stabilization. Now admitted on 4/24 for abdominal pain, vomiting and no BM x1 week. CT abd pelvis c/w early low grade SBO (s/p NGT placement), enlarging liver mass, and worsening bony mets, now with abnormal LFTs. Pt with likely poor prognosis. Palliative care consulted to help with establishing GOC.      1) N/V  - 2/2 to SBO  - surgical notes appreciated  - s/p NGT, improved with conservative management, tolerating clears  - c/w zofran and low dose steroids for now (decadron 4mg IV qd), if continues to tolerate progressive consistencies can likely stop after     2) Abd pain  - nociceptive pain 2/2 to visceral stretch from underlying SBO and enlarging liver mass  - c/w morphine IV 2mg or 4mg prn, no doses needed yet  - now resolved    3) Metastatic cervical cancer   - widespread bone mets, worsening, also enlarging left liver ?mass  - elvin/gyn also consulted, likely no surgical intervention  - oncology notes appreciated  - awaiting specialized MRI to determine if liver enlargement is due to benign v. malignant process. Plan for biopsy if inconclusive  - likely overall poor prognosis, elevated LFTs    4) Debility  - PPS 40-50%, active before this  - evidence of protein malnutrition with albumin 2.4, now 1.9    5) Prognosis  - guarded  - in light of extensive metastatic cervical cancer, c/b sbo, enlarging liver mass, worsened bony mets, abn LFTs, and poor nutritional status with pauctiy of curative options pt could likely fit criteria for hospice if no cure possible or opting out of this. Currently improving clinically, will need more information     6) GOC/Advanced Directives  - pt seems to have capacity for medical decision-making  - full code  - sister is POA w/ medical decision proxy as well, will bring form- Awilda Lewis [phone], father Julio Burnett [phone]  - had family meeting today, 4/26. Plan for specialized MRI and biopsy if indeterminate. Family agreeable to home palliative upon dc    Thank you for including us in Ms. Burnett's care. Will continue to follow with you.    Helen Bhardwaj MD  Palliative Care Attending

## 2017-04-26 NOTE — CHART NOTE - NSCHARTNOTEFT_GEN_A_CORE
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 Patient last completed chemotherapy in Jan 2017 and has dealt with chronic constipation issues.     PERTINENT PMH REVIEWED:  [ X ] YES [ ] NO           Primary Contact: Awilda Lewis  Relationship: Sister      HCP/Surrogate:  HCP               Phone Number: [phone]    Alternate Contact: Julio Burnett [phone] Father    HCP [ X ] Surrogate [   ] Guardian [   ]    Mental Status: [ X ] Alert  [ X  ] Oriented [  ] Confused [  ] Lethargic [  ]    Family Meeting attendees: pt, pts father, pts sister, Dr. Bhardwaj, Navjot Navarro [facility]iative [facility]    Anticipated Grief: Patient [ X ] Family [ X ]    Anticipated D/C Plan: Home with Home Palliative Program                   Summary:    Team met with pt. and her family to discuss goals of care, assist with planning and provide supportive counseling.    Pts current medical condition and goals of care discussed. Pt. and her family are agreeable to plan of a specialized MRI and then a biopsy only if the MRI is indeterminate. Pts main goal is to go home. Pt. does seem to have capacity to make medical decisions.     Pt. and her family report she has a POA that also states her sister Awilda as HCP. Pts sister will bring in copy of the form.     Team discussed option of home palliative program and pt. is agreeable to this.     Plan for pt. to return home with home palliative program upon d/c, agency to be determined. Emotional support provided.

## 2017-04-27 ENCOUNTER — TRANSCRIPTION ENCOUNTER (OUTPATIENT)
Age: 57
End: 2017-04-27

## 2017-04-27 VITALS
SYSTOLIC BLOOD PRESSURE: 100 MMHG | RESPIRATION RATE: 17 BRPM | DIASTOLIC BLOOD PRESSURE: 63 MMHG | HEART RATE: 100 BPM | TEMPERATURE: 97 F | OXYGEN SATURATION: 100 %

## 2017-04-27 LAB
ALBUMIN SERPL ELPH-MCNC: 2 G/DL — LOW (ref 3.3–5)
ALP SERPL-CCNC: 399 U/L — HIGH (ref 40–120)
ALT FLD-CCNC: 78 U/L — SIGNIFICANT CHANGE UP (ref 12–78)
ANION GAP SERPL CALC-SCNC: 12 MMOL/L — SIGNIFICANT CHANGE UP (ref 5–17)
AST SERPL-CCNC: 193 U/L — HIGH (ref 15–37)
BILIRUB DIRECT SERPL-MCNC: 1.3 MG/DL — HIGH (ref 0–0.2)
BILIRUB INDIRECT FLD-MCNC: 0.6 MG/DL — SIGNIFICANT CHANGE UP (ref 0.2–1)
BILIRUB SERPL-MCNC: 1.9 MG/DL — HIGH (ref 0.2–1.2)
BUN SERPL-MCNC: 15 MG/DL — SIGNIFICANT CHANGE UP (ref 7–23)
CA-I BLD-SCNC: 0.87 MMOL/L — LOW (ref 1.05–1.34)
CALCIUM SERPL-MCNC: 6.7 MG/DL — LOW (ref 8.5–10.1)
CHLORIDE SERPL-SCNC: 98 MMOL/L — SIGNIFICANT CHANGE UP (ref 96–108)
CO2 SERPL-SCNC: 23 MMOL/L — SIGNIFICANT CHANGE UP (ref 22–31)
CREAT SERPL-MCNC: 0.6 MG/DL — SIGNIFICANT CHANGE UP (ref 0.5–1.3)
GGT SERPL-CCNC: 406 U/L — HIGH (ref 8–40)
GLUCOSE SERPL-MCNC: 87 MG/DL — SIGNIFICANT CHANGE UP (ref 70–99)
MAGNESIUM SERPL-MCNC: 1.8 MG/DL — SIGNIFICANT CHANGE UP (ref 1.8–2.4)
POTASSIUM SERPL-MCNC: 3.9 MMOL/L — SIGNIFICANT CHANGE UP (ref 3.5–5.3)
POTASSIUM SERPL-SCNC: 3.9 MMOL/L — SIGNIFICANT CHANGE UP (ref 3.5–5.3)
PROT SERPL-MCNC: 5.2 GM/DL — LOW (ref 6–8.3)
SODIUM SERPL-SCNC: 133 MMOL/L — LOW (ref 135–145)

## 2017-04-27 RX ADMIN — PANTOPRAZOLE SODIUM 40 MILLIGRAM(S): 20 TABLET, DELAYED RELEASE ORAL at 13:31

## 2017-04-27 RX ADMIN — MORPHINE SULFATE 4 MILLIGRAM(S): 50 CAPSULE, EXTENDED RELEASE ORAL at 01:05

## 2017-04-27 RX ADMIN — Medication 4 MILLIGRAM(S): at 05:31

## 2017-04-27 RX ADMIN — MORPHINE SULFATE 4 MILLIGRAM(S): 50 CAPSULE, EXTENDED RELEASE ORAL at 05:55

## 2017-04-27 RX ADMIN — MORPHINE SULFATE 4 MILLIGRAM(S): 50 CAPSULE, EXTENDED RELEASE ORAL at 05:38

## 2017-04-27 RX ADMIN — Medication 25 MILLIGRAM(S): at 04:21

## 2017-04-27 RX ADMIN — MORPHINE SULFATE 4 MILLIGRAM(S): 50 CAPSULE, EXTENDED RELEASE ORAL at 01:51

## 2017-04-27 NOTE — PHYSICAL THERAPY INITIAL EVALUATION ADULT - PERTINENT HX OF CURRENT PROBLEM, REHAB EVAL
Pt. presents w/ c/o weakness and persistent nausea and vomiting. In Ed, pt. noted to be febrile. CT ab/pelvis revealed early SBO w/ large liver mass concerning for visceral metastatic disease.

## 2017-04-27 NOTE — PROGRESS NOTE ADULT - PROVIDER SPECIALTY LIST ADULT
Gastroenterology
Gastroenterology
Heme/Onc
Heme/Onc
Hospitalist
Hospitalist
Infectious Disease
Palliative Care
Surgery
Heme/Onc

## 2017-04-27 NOTE — DISCHARGE NOTE ADULT - CARE PROVIDER_API CALL
Kenny Rausch), Gastroenterology  [address] [place] [address]  Phone: [phone]  Fax: [phone]    Tia Coombs (MD), Hematology; Internal Medicine; Medical Oncology  [address] [place] [address]  Phone: [phone]  Fax: [phone]

## 2017-04-27 NOTE — DISCHARGE NOTE ADULT - CARE PLAN
Principal Discharge DX:	Malignant neoplasm of cervix, unspecified site  Goal:	No pain  Instructions for follow-up, activity and diet:	Follow up with Dr. Coombs for further recommendations.  Secondary Diagnosis:	Hepatomegaly  Goal:	Evaluation with MRI testing, no obvious cancer in the liver.  Instructions for follow-up, activity and diet:	Follow up with Dr. Valles for lab work and liver function test blood work and bowel obstruction.  Secondary Diagnosis:	Partial small bowel obstruction  Goal:	no further pain  Instructions for follow-up, activity and diet:	Advance diet.

## 2017-04-27 NOTE — PROGRESS NOTE ADULT - ASSESSMENT
56 yr old female w/ PMHx extensive abd surgery as a child (?omphaloceole), severe knee OA (awaiting replacement) recently diagnosed with metastatic cervical cancer with extensive bone mets in 8/16, after surgery for spinal stabilization, subsequently found to have a large cervical/uterine mass obstructing the ureters. Now s/p RT to spine, chemotherapy with 6 cycles of cisplatin and taxol + RT to pelvis. Of note, last bone scan in 2/16 showed widespread bone mets but asx,  and enlarged left lobe of liver with normal LFTs were also noted. No longer on chemo but is receiving xgeva for bone stabilization. Now admitted on 4/24 for abdominal pain, vomiting and no BM x1 week. CT abd pelvis c/w early low grade SBO (s/p NGT placement), enlarging liver mass, and worsening bony mets, now with abnormal LFTs. Pt with likely poor prognosis. Palliative care consulted to help with establishing GOC.      1) N/V  - 2/2 to SBO  - surgical notes appreciated  - s/p NGT, improved with conservative management, tolerating clears  - c/w zofran and low dose steroids for now (decadron 4mg IV qd), if continues to tolerate progressive consistencies can stop today    2) Pain: abdominal and knee  - nociceptive pain 2/2 to visceral stretch from underlying SBO and enlarging liver mass  - c/w morphine IV 2mg or 4mg prn, no doses needed yet  - also with msk pain from severe OA of knee at times  - reviewed home meds and was on dilaudid 2mg q12h prn, can resume this (can also make more often, q6h prn)     3) Metastatic cervical cancer   - widespread bone mets, worsening, also enlarging left liver ?mass  - elvin/gyn also consulted, likely no surgical intervention  - oncology notes appreciated  - specialized mRI noted, benign process, to follow up outpt     4) Debility  - PPS 40-50%, active before this  - evidence of protein malnutrition with albumin 2.4, now 1.9    5) Prognosis  - guarded  - in light of extensive metastatic cervical cancer, c/b sbo, enlarging liver mass, worsened bony mets, abn LFTs, and poor nutritional status with pauctiy of curative options pt could likely fit criteria for hospice if no cure possible or opting out of this. Currently improving clinically, will need to see how she fairs in the near future    6) GOC/Advanced Directives  - pt seems to have capacity for medical decision-making  - full code  - sister is POA w/ medical decision proxy as well, will bring form- Awilda Lewis [phone], father Julio Burnett [phone]  - had family meeting, 4/26. Family agreeable to home palliative upon dc    Thank you for including us in Ms. Burnett's care. Will continue to follow with you.    Helen Bhardwaj MD  Palliative Care Attending

## 2017-04-27 NOTE — PROGRESS NOTE ADULT - SUBJECTIVE AND OBJECTIVE BOX
CC:Patient is a 56y old  Female who presents with a chief complaint of Abdominal pain, nausea and vomiting. (27 Apr 2017 12:29)      Subjective:  Pt seen and examined at bedside with chaperone. Pt is AAOx3, pt in no acute distress. Pt denied c/o fever, chills, chest pain, SOB, abd pain, N/V/D, extremity pain or dysfunction, hemoptysis, hematemesis, hematuria, hematochexia, headache, diplopia, vertigo, dizzyness. Pt tolerating diet, (+) void, (+) ambulation, (+) bowel function    ROS:  Negative ROS    Vital Signs Last 24 Hrs  T(C): 36.3, Max: 36.7 (04-27 @ 04:07)  T(F): 97.4, Max: 98.1 (04-27 @ 04:07)  HR: 100 (93 - 100)  BP: 100/63 (91/41 - 105/64)  BP(mean): --  RR: 17 (15 - 17)  SpO2: 100% (92% - 100%)    Labs:                      10.4   8.6   )-----------( 138      ( 26 Apr 2017 05:45 )             33.0     CBC Full  -  ( 26 Apr 2017 05:45 )  WBC Count : 8.6 K/uL  Hemoglobin : 10.4 g/dL  Hematocrit : 33.0 %  Platelet Count - Automated : 138 K/uL  Mean Cell Volume : 86.7 fl  Mean Cell Hemoglobin : 27.2 pg  Mean Cell Hemoglobin Concentration : 31.4 gm/dL  Auto Neutrophil # : 7.6 K/uL  Auto Lymphocyte # : 0.2 K/uL  Auto Monocyte # : 0.7 K/uL  Auto Eosinophil # : 0.0 K/uL  Auto Basophil # : 0.1 K/uL  Auto Neutrophil % : 88.3 %  Auto Lymphocyte % : 2.8 %  Auto Monocyte % : 7.8 %  Auto Eosinophil % : 0.4 %  Auto Basophil % : 0.7 %    04-27    133<L>  |  98  |  15  ----------------------------<  87  3.9   |  23  |  0.60    Ca    6.7<L>      27 Apr 2017 05:48  Mg     1.8     04-27    TPro  5.2<L>  /  Alb  2.0<L>  /  TBili  1.9<H>  /  DBili  1.3<H>  /  AST  193<H>  /  ALT  78  /  AlkPhos  399<H>  04-27    LIVER FUNCTIONS - ( 27 Apr 2017 05:48 )  Alb: 2.0 g/dL / Pro: 5.2 gm/dL / ALK PHOS: 399 U/L / ALT: 78 U/L / AST: 193 U/L / GGT: x                 Meds:  morphine  - Injectable 2milliGRAM(s) IV Push every 4 hours PRN  morphine  - Injectable 4milliGRAM(s) IV Push every 4 hours PRN  acetaminophen   Tablet 650milliGRAM(s) Oral every 6 hours PRN  acetaminophen   Tablet. 650milliGRAM(s) Oral every 6 hours PRN  ondansetron Injectable 4milliGRAM(s) IV Push every 4 hours PRN  dexamethasone  Injectable 4milliGRAM(s) IV Push daily  pantoprazole  Injectable 40milliGRAM(s) IV Push daily  diphenhydrAMINE   Injectable 25milliGRAM(s) IV Push every 6 hours PRN      Radiology:      Physical exam:  Pt is aaox3  Pt in no acute distress  Resp: CTAB  CVS: S1S2(+)  ABD: bowel sounds (+), soft, non distended, no rebound, no guarding, no rigidity, no skin changes to exam. No tenderness to exam  EXT: no calf tenderness or edema to exam b/l, on VTE prophylaxis  Skin: no skin changes to exam

## 2017-04-27 NOTE — PHYSICAL THERAPY INITIAL EVALUATION ADULT - ADDITIONAL COMMENTS
Pt. reports living in two story house w/ sister. Pt. has a chair lift to second floor. Pt. uses a RW to ambulate as baseline. Pt. has 1 RAGHAV house w/ HR. Pt. also has a commode.

## 2017-04-27 NOTE — PROGRESS NOTE ADULT - PROBLEM SELECTOR PLAN 1
metastatic to bone  possible liver mets vs focal hyperplasia of liver  case discussed extensively at the tumor board and with IR.  left lobe enlargement is most likely due to focal hyperplasia and not malignancy but the clinical course of rapid enlargement and abnormal LFTs unusual.  MRI scan with E[facility] shall most likely answer the question, if not liver biopsy is indicated.    Plan : MRI with [facility].  liver biopsy if results positive for met or equivocal
with bone mets with radiological progression in the bones  shall explore the possibility of immunotherapy off label as outpatient
DC NGT  full liquid diet.

## 2017-04-27 NOTE — PROGRESS NOTE ADULT - ASSESSMENT
SBO, possible partial  improving    Liver lesion   DW oncology and hospitalis    possible FNH and inc ap and ast may be due to cancer and bony dx  check GTP  FU as out pt DW pt       PPI  follow HCT

## 2017-04-27 NOTE — PROGRESS NOTE ADULT - ASSESSMENT
A/P:  SBO, resolved  Metastatic cervical cancer  Hepatomegaly r/o malignancy  Diet as tolerated  GI/DVT prophylaxis  Pain control  Monitor bowel function  Serial abd exams benign  Pt stable from surgical standpoint  Oncology on consult  Medical management per primary service  ID on consult  Pt aware of and agrees with all of the above

## 2017-04-27 NOTE — DISCHARGE NOTE ADULT - PATIENT PORTAL LINK FT
“You can access the [facility] Patient Portal, offered by [facility], by registering with the following website: [email or web address]”

## 2017-04-27 NOTE — PROGRESS NOTE ADULT - PROBLEM SELECTOR PLAN 3
as per [facility] MRI scan, focal nodular hyperplasia with diffuse underlying hepatic dysfunction.  biopsy cancelled.  GI FU

## 2017-04-27 NOTE — DISCHARGE NOTE ADULT - MEDICATION SUMMARY - MEDICATIONS TO TAKE
I will START or STAY ON the medications listed below when I get home from the hospital:    HYDROmorphone 2 mg oral tablet  -- 1 tab(s) by mouth every 12 hours  -- Indication: For Pain

## 2017-04-27 NOTE — PROGRESS NOTE ADULT - SUBJECTIVE AND OBJECTIVE BOX
Patient is a 56y old  Female who admitted with a chief complaint of abdominal pain nausea and vomiting with constipation    HPI:  56 yr old female who was diagnosed with metastatic  cervical cancer with extensive bone mets in 8/16. she required surgery for spinal stabilization and the path was squamous cell ca with positive p16 consistent with cervical cancer. subsequently a large cervical/uterine mass obstructing the ureters was also noted.    she received RT to her spine and also received chemotherapy with 6 cycles of cisplatin and taxol with radiation therapy to her pelvis.    last bone scan in 2/16 showed widespread bone mets but the patient was asymptomatic and no obvious destructive lesions were seen and she is currently not on chemotherapy but is receiving xgeva for bone stabilization. and enlarged left lobe of liver with normal LFTs were also noted    she is awaiting a knee replacement for severe OA.    now presents with abdominal pain, Vomiting and no BM for 1 week    found to have SBO , abnormal LFTs and further enlargement of the left lobe of the liver.    NGT removed and nausea and vomiting resolved.    case discussed extensively at the tumor board and with IR.    left lobe enlargement is most likely due to focal hyperplasia and not malignancy but the clinical course of rapid enlargement and abnormal LFTs unusual.    MRI scan with EOVIST reported as focal hyperplasia rather than malignancy    the patient is feeling very well and tolerating po.        PAST MEDICAL & SURGICAL HISTORY:  cervical cancer as above with bone mets  lumbar spine stabilization for pathological fracture  mediport  Hernia repair  abdominal wall defect, congenital.    REVIEW OF SYSTEMS    General:	The patient feels well today.  no unexpected weight loss. Appetite good . no night sweats.  Skin: no Rash.	  ENT: no sore throat or oral pain. no difficulty with swallowing  Respiratory: No chest pain, No shortness of breath, no hemoptysis, no cough, no chest pain.  Cardiovascular : No chest pain. no palpitation.  Gastrointestinal: no abdominal pain, vomiting orconstipation.  Genitourinary: No hematuria , no dysuria	  Musculoskeletal: No myalgia, no arthralgia, no back pain, no joint swelling  Neurological: no headaches, no dizzyness, no weakness, no double vision. 	  Psychiatric: no change in mood. 	  Hematology/Lymphatics: + weakness. 	  Endocrine:	no burning in urine or frequency  Allergic/Immunologic:	 no fever.     Allergies    No Known Allergies    Intolerances        Occupation:  [employer]:+ Smoking: Denied Drug Use: Denied  Marital Status:         FAMILY HISTORY:      MEDICATIONS  (STANDING):  dexamethasone  Injectable 4milliGRAM(s) IV Push daily  pantoprazole  Injectable 40milliGRAM(s) IV Push daily      Exam:    Vital Signs Last 24 Hrs  T(C): 36.7, Max: 36.7 (04-27 @ 04:07)  T(F): 98.1, Max: 98.1 (04-27 @ 04:07)  HR: 100 (91 - 100)  BP: 91/41 (91/41 - 105/64)  BP(mean): --  RR: 16 (15 - 17)  SpO2: 92% (92% - 95%)          Gen: appears comfortable. anicteric  HEENT: normocephalic/atraumatic, no conjunctival pallor, no scleral icterus, no oral thrush/mucosal bleeding/mucositis  Neck: supple, no masses, no JVD  Breasts: deferred  Back: nontender  Cardiovascular: heart sounds Normal S1S2, no murmurs/rubs/gallops  Respiratory: air entry normal and equal on both sides. no wheeze, no ronchi,   Gastrointestinal: large liver palpable to below the umbilicus. no evidence for ascites. BS+  Genitourinary: deferred  Rectal: deferred  Extremities: no clubbing/cyanosis, no edema, no calf tenderness  Neurological:  Alert oriented X3 cranial nerves normal. no focal deficits  Skin: no rash on visible skin  Lymph Nodes:  no cervical/supraclavicular LAD, no axillary/groin LAD  Musculoskeletal:  full ROM  Psychiatric:  mood appears normal. anxious.        LABS:                                              10.4   8.6   )-----------( 138      ( 26 Apr 2017 05:45 )             33.0     04-27    133<L>  |  98  |  15  ----------------------------<  87  3.9   |  23  |  0.60    Ca    6.7<L>      27 Apr 2017 05:48  Mg     1.8     04-27    TPro  5.2<L>  /  Alb  2.0<L>  /  TBili  1.9<H>  /  DBili  1.3<H>  /  AST  193<H>  /  ALT  78  /  AlkPhos  399<H>  04-27    LIVER FUNCTIONS - ( 27 Apr 2017 05:48 )  Alb: 2.0 g/dL / Pro: 5.2 gm/dL / ALK PHOS: 399 U/L / ALT: 78 U/L / AST: 193 U/L / GGT: x                     RADIOLOGY & ADDITIONAL STUDIES:    MRI with EOVIST        Contrast: 10 cc Eovist .    COMPARISON: MRI abdomen 4/24/2017.    FINDINGS:    There is diffusely heterogeneous hepatocyte uptake of Eovist on the 20   minute hepatobiliary phase images with poor biliary excretion reflecting   a degree of hepatocellular dysfunction.    Multiple masses in the left lobe of the liver are again seen. The   precontrast signal characteristics and early dynamic postcontrast imaging   features were described on the prior report. On the 20 minute   hepatobiliary phase, there is retention of Eovist similar to the   background liver in the right lobe indicating functioning hepatocytes   within these masses.    IMPRESSION:    Multiple left lobe lesions in question on the prior MRI demonstrate   hepatocyte uptake and excretion of contrast similar to the uninvolved   right lobe of the liver in keeping with multiple areas of focal nodular   hyperplasia.    Diffusely heterogeneous Eovist uptake and poor biliary excretion   throughout the liver indicating a degree of hepatocellular dysfunction as   suggested by abnormal liver function tests.    JESSI DURAND   This document has been electronically signed. Apr 26 2017  1:45PM

## 2017-04-27 NOTE — DISCHARGE NOTE ADULT - HOSPITAL COURSE
CC: abdominal pain.     HPI: This is a 55 y/o female with PMH of omphalocele s/p multiple abdominal surgeries as a child, metastatic cervical cancer with extensive bone mets (diagnosed in 8/16) s/p surgery for spinal stabilization s/p XRT to her pelvis and chemotherapy (cisplatin and taxol). Patient last completed chemotherapy in Jan 2017 and has dealt with chronic constipation issues. She reports having weakness and persistent nausea and vomiting. In the ED, patient notably febrile, lactate 3.3. CT abd / pelvis revealed early small bowel obstruction with large liver mass concerning for visceral metastic disease with enlarged liver (new for patient). She was given IV Zosyn and Vanc empirically, seen by surgery with NG tube placed.     Hospital course: no active infection noted thus IV Zosyn stopped after 2 days. SBO managed conservatively with NGT.   MRI of the liver w/ EOVIST did not reveal malignancy.   Instructions; f/ u with GI for GTP results and recs.     4/27/17: Doing well, tolerating low residue diet. No abd pain currently. + BM this AM. Eager for dc home.     ROS: stated above.     Vital Signs Last 24 Hrs  T(C): 36.7, Max: 36.7 (04-27 @ 04:07)  T(F): 98.1, Max: 98.1 (04-27 @ 04:07)  HR: 100 (91 - 100)  BP: 91/41 (91/41 - 105/64)  BP(mean): --  RR: 16 (15 - 17)  SpO2: 92% (92% - 95%)    PHYSICAL EXAM:  Constitutional: middle aged female weak but less ill appearing today.    HEENT: PERR, EOMI, Normal Hearing, Nares patent, NGT out.   Neck: Soft and supple, No LAD, No JVD  Respiratory: Breath sounds are clear bilaterally, No wheezing, rales or rhonchi  Cardiovascular: S1 and S2, regular rate and rhythm, no Murmurs, gallops or rubs  Gastrointestinal: Bowel Sounds present however with notable distention but. Nontender to light palpation. Enlarged liver. No guarding, no rebound  Extremities: No peripheral edema  Vascular: 2+ peripheral pulses  Neurological: A/O x 3, no focal deficits  Musculoskeletal: 5/5 strength b/l upper and lower extremities  Skin: No rashes    Labs reviewed / Meds/ Images.                         10.4   8.6   )-----------( 138      ( 26 Apr 2017 05:45 )             33.0   04-27    133<L>  |  98  |  15  ----------------------------<  87  3.9   |  23  |  0.60    Ca    6.7<L>      27 Apr 2017 05:48  Mg     1.8     04-27    TPro  5.2<L>  /  Alb  2.0<L>  /  TBili  1.9<H>  /  DBili  1.3<H>  /  AST  193<H>  /  ALT  78  /  AlkPhos  399<H>  04-27    · Assessment		  This is a 56 y.o F PMH of prior abdominal surgeries as a child with metastatic cervical cancer to bone presents with abdominal pain, nausea and vomiting concerning for early small bowel obstruction and likely metastatic disease to the liver:     # Small Bowel Obstruction - 2/2 malignancy and complications, now resolved.   # Nausea / Vomiting - resolved.   # Acute Abdominal Pain - improving.   - NGT X 1 day, pulled out on 4/25.   - reviewed MRI Abd w/ EOVIST, no malignancy noted. GGT level sent per discussion with GI today --> outpatient f/u for results and to trend LFTs.   - IR biopsy canceled.   - has pain meds at home.   - GYN Onc Dr. Huang aware of patient's hospitalization, no gyn onc surgery planned and defers to general surgery for management of SBO.   - off IV Zosyn X 2 days. No signs of infection.     # Liver Lesion - likely FNH.   - discussed with Onc and GI today, see above.     # Hypocalcemia - corrected Serum Ca 8.1, checked ionized Luke in AM.        # Metastatic Cervical Cancer - known bony mets. Plan for outpatient chemo.   - Heme following, last Chemo in Jan 2017.   - discussed with Palliative.       Total time > 35 mins.   Informed father and staff.

## 2017-04-27 NOTE — PROGRESS NOTE ADULT - SUBJECTIVE AND OBJECTIVE BOX
CC: knee pain overnight, better now  HPI: Pt seen and examined this am. Pt feeling well, explaining having knee pain yesterday, given morphine for this, denies this now. Eating/drinking without issues, denies n/v. Had BM also without issue also. She feels well and is excited to go home today.       PAIN: denies  Location  Intensity  Quality  Aggravating Factors  Alleviating Factors  Timing    DYSPNEA: denies      ROS:    Review of Systems:    Anxiety- denies  Physical Discomfort- mild  Constipation- denies, last bm in ED no issues  Diarrhea- denies  Nausea/Vomiting- denies  Anorexia- endorses hunger    All other systems reviewed and negative    PHYSICAL EXAM:    Vital Signs Last 24 Hrs  T(C): 36.7, Max: 36.7 (04-27 @ 04:07)  T(F): 98.1, Max: 98.1 (04-27 @ 04:07)  HR: 100 (91 - 100)  BP: 91/41 (91/41 - 105/64)  BP(mean): --  RR: 16 (15 - 17)  SpO2: 92% (92% - 95%)  Daily       PPSV2: 40  %  FAST:    General: Middle aged female, sitting up in chair, NAD, bright affect today, pleasant  Mental Status: aOx3  HEENT: dmm, perrl, eomi  Lungs: dec at bases bl  Cardiac: +s1 s2 rrr  GI: +bs, soft, nt, healed surgical scar, palpable liver below costal angle  Ext: scant pedal edema  Neuro: no focal deficits    LABS:                        10.4   8.6   )-----------( 138      ( 26 Apr 2017 05:45 )             33.0     04-27    133<L>  |  98  |  15  ----------------------------<  87  3.9   |  23  |  0.60    Ca    6.7<L>      27 Apr 2017 05:48  Mg     1.8     04-27    TPro  5.2<L>  /  Alb  2.0<L>  /  TBili  1.9<H>  /  DBili  1.3<H>  /  AST  193<H>  /  ALT  78  /  AlkPhos  399<H>  04-27      Albumin: Albumin, Serum: 2.0 g/dL (04-27 @ 05:48)      Allergies    No Known Allergies    Intolerances      MEDICATIONS  (STANDING):  dexamethasone  Injectable 4milliGRAM(s) IV Push daily  pantoprazole  Injectable 40milliGRAM(s) IV Push daily    MEDICATIONS  (PRN):  morphine  - Injectable 2milliGRAM(s) IV Push every 4 hours PRN Moderate Pain (4 - 6)  morphine  - Injectable 4milliGRAM(s) IV Push every 4 hours PRN Severe Pain (7 - 10)  acetaminophen   Tablet 650milliGRAM(s) Oral every 6 hours PRN For Temp greater than 38 C (100.4 F)  acetaminophen   Tablet. 650milliGRAM(s) Oral every 6 hours PRN Mild Pain (1 - 3)  ondansetron Injectable 4milliGRAM(s) IV Push every 4 hours PRN Nausea and/or Vomiting  diphenhydrAMINE   Injectable 25milliGRAM(s) IV Push every 6 hours PRN Rash and/or Itching      RADIOLOGY:

## 2017-04-27 NOTE — PROGRESS NOTE ADULT - SUBJECTIVE AND OBJECTIVE BOX
Patient is a 56y old  Female who presents with a chief complaint of Abdominal pain, nausea and vomiting. (24 Apr 2017 13:35)      HPI:  This is a 55 y/o female with PMH of omphalocele s/p multiple abdominal surgeries as a child, metastatic cervical cancer with extensive bone mets (diagnosed in 8/16) s/p surgery for spinal stabilization s/p XRT to her pelvis and chemotherapy (cisplatin and taxol). Patient last completed chemotherapy in Jan 2017 and has dealt with chronic constipation issues. She reports having weakness and persistent nausea and vomiting. In the ED, patient notably febrile, lactate 3.3. CT abd / pelvis revealed early small bowel obstruction with large liver mass concerning for visceral metastic disease with enlarged liver (new for patient). She was given IV Zosyn and Vanc empirically, seen by surgery with NG tube placed.         pt feels more comfortable and mason diet  wants to go home    MRI with EOVIST noted    DW hospitalist and oncology    Patient very weak with mild abdominal pain having some relief with NGT. No CP or SOB. Fever resolved.   ROS: stated above.       PAST MEDICAL & SURGICAL HISTORY:  cervical cancer as above with bone mets  lumbar spine stabilization for pathological fracture  mediport  Hernia repair  abdominal wall defect, congenital.  Omphacele    Social Hx: lives with , former smoker quit > 1 year ago. No illicit drug use.   Family Hx: unknown  NKDA    Vital Signs Last 24 Hrs  T(C): 36.4, Max: 38.7 (04-23 @ 21:00)  T(F): 97.6, Max: 101.7 (04-23 @ 21:00)  HR: 86 (86 - 117)  BP: 104/61 (104/61 - 123/72)  BP(mean): --  RR: 20 (16 - 20)  SpO2: 98% (93% - 100%)    PHYSICAL EXAM:  Constitutional: middle aged female weak appearing in mild distress. Awake.   HEENT: PERR, EOMI, Normal Hearing, NGT in place, dark brown feculent material in suction bottle  Neck: Soft and supple, No LAD, No JVD  Respiratory: Breath sounds are clear bilaterally, No wheezing, rales or rhonchi  Cardiovascular: S1 and S2, regular rate and rhythm, no Murmurs, gallops or rubs  Gastrointestinal: Bowel Sounds present however with notable distention. Nontender to light palpation. Enlarged liver. No guarding, no rebound  Extremities: No peripheral edema  Vascular: 2+ peripheral pulses  Neurological: A/O x 3, no focal deficits  Musculoskeletal: 5/5 strength b/l upper and lower extremities  Skin: No rashes      MEDICATIONS  (STANDING):  piperacillin/tazobactam IVPB. 3.375Gram(s) IV Intermittent every 8 hours  dextrose 5% + sodium chloride 0.45%. 1000milliLiter(s) IV Continuous <Continuous>      LABS: All Labs Reviewed:                        11.4   10.9  )-----------( 160      ( 23 Apr 2017 21:13 )             32.9     04-23    127<L>  |  90<L>  |  17  ----------------------------<  132<H>  3.7   |  24  |  0.73    Ca    7.3<L>      23 Apr 2017 20:13    TPro  6.7  /  Alb  2.4<L>  /  TBili  2.5<H>  /  DBili  x   /  AST  203<H>  /  ALT  84<H>  /  AlkPhos  420<H>  04-23    PT/INR - ( 23 Apr 2017 20:13 )   PT: 16.9 sec;   INR: 1.55 ratio    PTT - ( 23 Apr 2017 20:13 )  PTT:32.5 sec  CARDIAC MARKERS trops < 0.01 X 3.       Blood Culture: pending    Radiology:   CT Abd/Pelvis IMPRESSION:  Stomach and proximal small bowel loops are decompressed.  There are   dilated loops of mid small bowel measuring up to 4.5 cm multiple   air-fluid levels.  The distal small bowel is collapsed.  The colon is   underdistended.  Findings are suspicious for an early or low-grade bowel   obstruction.  Discrete transition point is not visualized.  The patient   would likely benefit from nasogastric tube placement.  If symptoms   persist or worsen, a follow-up CT with oral contrast can be performed.    There are two ventral abdominal wall hernias with nonobstructed distal   small bowel loop seen inside the rectus sheath; this does not contribute   to the patient's bowel obstruction.       Interval progression of masslike hypertrophy of the left hepatic lobe   which now measures 9 x 19 x 19 cm compared to 8 x14 x 16.5 cm   08/02/2016.  Underlying cirrhosis should be considered.  MRI can be   performed to exclude hepatocellular carcinoma.  Liver biopsy can be   performed as clinically warranted.    Progression of osseous metastatic disease throughout the visualized   spine, ribs and bony pelvis since 08/02/2014.    US Abd: Gallbladder sludge.  No evidence of gallstones, cholecystitis or biliary   duct ductal dilatation. (24 Apr 2017 13:35)      PAST MEDICAL & SURGICAL HISTORY:  Cervical ca  Spinal surgery in prior 3 months: August 2016  Personal history of spine surgery: August 2016      MEDICATIONS  (STANDING):  dexamethasone  Injectable 4milliGRAM(s) IV Push daily  pantoprazole  Injectable 40milliGRAM(s) IV Push daily    MEDICATIONS  (PRN):  morphine  - Injectable 2milliGRAM(s) IV Push every 4 hours PRN Moderate Pain (4 - 6)  morphine  - Injectable 4milliGRAM(s) IV Push every 4 hours PRN Severe Pain (7 - 10)  acetaminophen   Tablet 650milliGRAM(s) Oral every 6 hours PRN For Temp greater than 38 C (100.4 F)  acetaminophen   Tablet. 650milliGRAM(s) Oral every 6 hours PRN Mild Pain (1 - 3)  ondansetron Injectable 4milliGRAM(s) IV Push every 4 hours PRN Nausea and/or Vomiting  diphenhydrAMINE   Injectable 25milliGRAM(s) IV Push every 6 hours PRN Rash and/or Itching      Allergies    No Known Allergies    Intolerances        SOCIAL HISTORY:unchanged    FAMILY HISTORY:unchanged      REVIEW OF SYSTEMS:    CONSTITUTIONAL: No weakness, fevers or chills  EYES/ENT: No visual changes;  No vertigo or throat pain   NECK: No pain or stiffness  RESPIRATORY: No cough, wheezing, hemoptysis; No shortness of breath  CARDIOVASCULAR: No chest pain or palpitations  GENITOURINARY: No dysuria, frequency or hematuria  NEUROLOGICAL: No numbness or weakness  SKIN: No itching, burning, rashes, or lesions   All other review of systems is negative unless indicated above.    Vital Signs Last 24 Hrs  T(C): 36.7, Max: 36.7 (04-27 @ 04:07)  T(F): 98.1, Max: 98.1 (04-27 @ 04:07)  HR: 100 (91 - 100)  BP: 91/41 (91/41 - 105/64)  BP(mean): --  RR: 16 (15 - 17)  SpO2: 92% (92% - 95%)    PHYSICAL EXAM:    Constitutional: NAD, well-developed  HEENT: EOMI, throat clear  Neck: No LAD, supple  Respiratory: CTA and P  Cardiovascular: S1 and S2, RRR, no M  Gastrointestinal: BS+, soft, NT/ND, neg HSM,  Extremities: No peripheral edema, neg clubing, cyanosis  Vascular: 2+ peripheral pulses  Neurological: A/O x 3, no focal deficits  Psychiatric: Normal mood, normal affect  Skin: No rashes    LABS:  CBC Full  -  ( 26 Apr 2017 05:45 )  WBC Count : 8.6 K/uL  Hemoglobin : 10.4 g/dL  Hematocrit : 33.0 %  Platelet Count - Automated : 138 K/uL  Mean Cell Volume : 86.7 fl  Mean Cell Hemoglobin : 27.2 pg  Mean Cell Hemoglobin Concentration : 31.4 gm/dL  Auto Neutrophil # : 7.6 K/uL  Auto Lymphocyte # : 0.2 K/uL  Auto Monocyte # : 0.7 K/uL  Auto Eosinophil # : 0.0 K/uL  Auto Basophil # : 0.1 K/uL  Auto Neutrophil % : 88.3 %  Auto Lymphocyte % : 2.8 %  Auto Monocyte % : 7.8 %  Auto Eosinophil % : 0.4 %  Auto Basophil % : 0.7 %    04-27    133<L>  |  98  |  15  ----------------------------<  87  3.9   |  23  |  0.60    Ca    6.7<L>      27 Apr 2017 05:48  Mg     1.8     04-27    TPro  5.2<L>  /  Alb  2.0<L>  /  TBili  1.9<H>  /  DBili  1.3<H>  /  AST  193<H>  /  ALT  78  /  AlkPhos  399<H>  04-27            RADIOLOGY & ADDITIONAL STUDIES:  EXAM:  MRI ABDOMEN W WO CONTRAST                            PROCEDURE DATE:  04/26/2017        INTERPRETATION:  MRI ABDOMEN W WO CONTRAST    HISTORY:  cervical cancer metastatic to bone with enlarging left lobe of   liver  rule out met vs FNH    TECHNIQUE: Multiplanar T1-weighted, T2-weighted, and diffusion weighted   sequences were obtained of the abdomen, including dynamic post-contrast   T1-weighted sequences.         Field Strength: 1.5T    Contrast: 10 cc Eovist .    COMPARISON: MRI abdomen 4/24/2017.    FINDINGS:    There is diffusely heterogeneous hepatocyte uptake of Eovist on the 20   minute hepatobiliary phase images with poor biliary excretion reflecting   a degree of hepatocellular dysfunction.    Multiple masses in the left lobe of the liver are again seen. The   precontrast signal characteristics and early dynamic postcontrast imaging   features were described on the prior report. On the 20 minute   hepatobiliary phase, there is retention of Eovist similar to the   background liver in the right lobe indicating functioning hepatocytes   within these masses.    IMPRESSION:    Multiple left lobe lesions in question on the prior MRI demonstrate   hepatocyte uptake and excretion of contrast similar to the uninvolved   rightlobe of the liver in keeping with multiple areas of focal nodular   hyperplasia.    Diffusely heterogeneous Eovist uptake and poor biliary excretion   throughout the liver indicating a degree of hepatocellular dysfunction as   suggested by abnormal liver function tests.              JESSI DURAND   This document has been electronically signed. Apr 26 2017  1:45PM

## 2017-04-27 NOTE — DISCHARGE NOTE ADULT - PLAN OF CARE
No pain Follow up with Dr. Coombs for further recommendations. Follow up with Dr. Valles for lab work and liver function test blood work and bowel obstruction. no further pain Advance diet. Evaluation with MRI testing, no obvious cancer in the liver.

## 2017-04-27 NOTE — CHART NOTE - NSCHARTNOTEFT_GEN_A_CORE
Palliative Medicine Discharge Summary    **This is a summary of the conversations and decisions you have made with the support of the Palliative Care Team**    Family Meeting Held: 4/26/17  Advance Directives:  -Health Care Proxy: Kasandra Lewis    Goals of Care/Preferences for Treatment:  We met together with your family to talk about your diagnosis and how this will effect your life and what is important to you. You shared a desire to feel better and get home with a plan. Thus, we worked with you and your team to share as much information about the plan as possible. Imaging was completed and thankfully your symptoms resolved. We agreed that going home with home palliative care services would ensure your symptoms would continue to be properly treated and your family would be fully supported.     Discharge Plan:    Plan is to go home today 4/27, with these services in place via [facility]. You can expect that you will continue to have the best care and respect of your right to dignity and to be without suffering.     It has been a pleasure to get to know you and your family. We wish you all the best.    Helen Bhardwaj MD  Palliative Care Attending

## 2017-04-29 ENCOUNTER — INPATIENT (INPATIENT)
Facility: HOSPITAL | Age: 57
LOS: 3 days | End: 2017-05-03
Admitting: INTERNAL MEDICINE
Payer: COMMERCIAL

## 2017-04-29 VITALS
DIASTOLIC BLOOD PRESSURE: 75 MMHG | RESPIRATION RATE: 22 BRPM | HEART RATE: 133 BPM | OXYGEN SATURATION: 99 % | SYSTOLIC BLOOD PRESSURE: 125 MMHG

## 2017-04-29 DIAGNOSIS — Z98.890 OTHER SPECIFIED POSTPROCEDURAL STATES: Chronic | ICD-10-CM

## 2017-04-29 DIAGNOSIS — Z45.2 ENCOUNTER FOR ADJUSTMENT AND MANAGEMENT OF VASCULAR ACCESS DEVICE: Chronic | ICD-10-CM

## 2017-04-29 LAB
ALBUMIN SERPL ELPH-MCNC: 2.1 G/DL — LOW (ref 3.3–5)
ALP SERPL-CCNC: 476 U/L — HIGH (ref 40–120)
ALT FLD-CCNC: 97 U/L — HIGH (ref 12–78)
ANION GAP SERPL CALC-SCNC: 16 MMOL/L — SIGNIFICANT CHANGE UP (ref 5–17)
APPEARANCE UR: (no result)
APTT BLD: 33 SEC — SIGNIFICANT CHANGE UP (ref 27.5–37.4)
AST SERPL-CCNC: 247 U/L — HIGH (ref 15–37)
BACTERIA # UR AUTO: (no result)
BASE EXCESS BLDA CALC-SCNC: -3 MMOL/L — LOW (ref -2–2)
BASOPHILS # BLD AUTO: 0.1 K/UL — SIGNIFICANT CHANGE UP (ref 0–0.2)
BASOPHILS NFR BLD AUTO: 0.7 % — SIGNIFICANT CHANGE UP (ref 0–2)
BILIRUB SERPL-MCNC: 3.2 MG/DL — HIGH (ref 0.2–1.2)
BILIRUB UR-MCNC: (no result)
BLOOD GAS COMMENTS ARTERIAL: SIGNIFICANT CHANGE UP
BUN SERPL-MCNC: 16 MG/DL — SIGNIFICANT CHANGE UP (ref 7–23)
CALCIUM SERPL-MCNC: 6.6 MG/DL — LOW (ref 8.5–10.1)
CHLORIDE SERPL-SCNC: 95 MMOL/L — LOW (ref 96–108)
CO2 SERPL-SCNC: 21 MMOL/L — LOW (ref 22–31)
COLOR SPEC: (no result)
COMMENT - URINE: SIGNIFICANT CHANGE UP
CREAT SERPL-MCNC: 0.66 MG/DL — SIGNIFICANT CHANGE UP (ref 0.5–1.3)
CULTURE RESULTS: SIGNIFICANT CHANGE UP
CULTURE RESULTS: SIGNIFICANT CHANGE UP
DIFF PNL FLD: (no result)
EOSINOPHIL # BLD AUTO: 0.1 K/UL — SIGNIFICANT CHANGE UP (ref 0–0.5)
EOSINOPHIL NFR BLD AUTO: 0.3 % — SIGNIFICANT CHANGE UP (ref 0–6)
EPI CELLS # UR: SIGNIFICANT CHANGE UP
GAS PNL BLDA: SIGNIFICANT CHANGE UP
GLUCOSE SERPL-MCNC: 113 MG/DL — HIGH (ref 70–99)
GLUCOSE UR QL: NEGATIVE MG/DL — SIGNIFICANT CHANGE UP
HCO3 BLDA-SCNC: 19 MMOL/L — LOW (ref 21–29)
HCT VFR BLD CALC: 33.2 % — LOW (ref 34.5–45)
HGB BLD-MCNC: 11.1 G/DL — LOW (ref 11.5–15.5)
HOROWITZ INDEX BLDA+IHG-RTO: 32 — SIGNIFICANT CHANGE UP
INR BLD: 1.48 RATIO — HIGH (ref 0.88–1.16)
KETONES UR-MCNC: (no result)
LACTATE SERPL-SCNC: 4.4 MMOL/L — CRITICAL HIGH (ref 0.7–2)
LACTATE SERPL-SCNC: 5.2 MMOL/L — CRITICAL HIGH (ref 0.7–2)
LEUKOCYTE ESTERASE UR-ACNC: (no result)
LYMPHOCYTES # BLD AUTO: 0.3 K/UL — LOW (ref 1–3.3)
LYMPHOCYTES # BLD AUTO: 2.1 % — LOW (ref 13–44)
MCHC RBC-ENTMCNC: 28.9 PG — SIGNIFICANT CHANGE UP (ref 27–34)
MCHC RBC-ENTMCNC: 33.3 GM/DL — SIGNIFICANT CHANGE UP (ref 32–36)
MCV RBC AUTO: 86.8 FL — SIGNIFICANT CHANGE UP (ref 80–100)
MONOCYTES # BLD AUTO: 0.6 K/UL — SIGNIFICANT CHANGE UP (ref 0–0.9)
MONOCYTES NFR BLD AUTO: 4.1 % — SIGNIFICANT CHANGE UP (ref 2–14)
NEUTROPHILS # BLD AUTO: 14.7 K/UL — HIGH (ref 1.8–7.4)
NEUTROPHILS NFR BLD AUTO: 92.7 % — HIGH (ref 43–77)
NITRITE UR-MCNC: NEGATIVE — SIGNIFICANT CHANGE UP
NT-PROBNP SERPL-SCNC: 1966 PG/ML — HIGH (ref 0–125)
PCO2 BLDA: 25 MMHG — LOW (ref 32–46)
PH BLDA: 7.49 — HIGH (ref 7.35–7.45)
PH UR: 5 — SIGNIFICANT CHANGE UP (ref 5–8)
PLATELET # BLD AUTO: 116 K/UL — LOW (ref 150–400)
PO2 BLDA: 78 — SIGNIFICANT CHANGE UP
POTASSIUM SERPL-MCNC: 3.7 MMOL/L — SIGNIFICANT CHANGE UP (ref 3.5–5.3)
POTASSIUM SERPL-SCNC: 3.7 MMOL/L — SIGNIFICANT CHANGE UP (ref 3.5–5.3)
PROT SERPL-MCNC: 5.7 GM/DL — LOW (ref 6–8.3)
PROT UR-MCNC: 30 MG/DL
PROTHROM AB SERPL-ACNC: 16.1 SEC — HIGH (ref 9.8–12.7)
RAPID RVP RESULT: SIGNIFICANT CHANGE UP
RBC # BLD: 3.83 M/UL — SIGNIFICANT CHANGE UP (ref 3.8–5.2)
RBC # FLD: 16.4 % — HIGH (ref 10.3–14.5)
RBC CASTS # UR COMP ASSIST: SIGNIFICANT CHANGE UP /HPF (ref 0–4)
SAO2 % BLDA: 95 — SIGNIFICANT CHANGE UP
SODIUM SERPL-SCNC: 132 MMOL/L — LOW (ref 135–145)
SP GR SPEC: 1.02 — SIGNIFICANT CHANGE UP (ref 1.01–1.02)
SPECIMEN SOURCE: SIGNIFICANT CHANGE UP
SPECIMEN SOURCE: SIGNIFICANT CHANGE UP
TROPONIN I SERPL-MCNC: <0.015 NG/ML — SIGNIFICANT CHANGE UP (ref 0.01–0.04)
TROPONIN I SERPL-MCNC: <0.015 NG/ML — SIGNIFICANT CHANGE UP (ref 0.01–0.04)
UROBILINOGEN FLD QL: 4 MG/DL
WBC # BLD: 15.8 K/UL — HIGH (ref 3.8–10.5)
WBC # FLD AUTO: 15.8 K/UL — HIGH (ref 3.8–10.5)
WBC UR QL: SIGNIFICANT CHANGE UP

## 2017-04-29 PROCEDURE — 93010 ELECTROCARDIOGRAM REPORT: CPT

## 2017-04-29 PROCEDURE — 93970 EXTREMITY STUDY: CPT | Mod: 26

## 2017-04-29 PROCEDURE — 99285 EMERGENCY DEPT VISIT HI MDM: CPT

## 2017-04-29 PROCEDURE — 71250 CT THORAX DX C-: CPT | Mod: 26

## 2017-04-29 PROCEDURE — 71010: CPT | Mod: 26

## 2017-04-29 RX ORDER — HYDROMORPHONE HYDROCHLORIDE 2 MG/ML
1 INJECTION INTRAMUSCULAR; INTRAVENOUS; SUBCUTANEOUS
Qty: 0 | Refills: 0 | COMMUNITY

## 2017-04-29 RX ORDER — SODIUM CHLORIDE 9 MG/ML
1000 INJECTION INTRAMUSCULAR; INTRAVENOUS; SUBCUTANEOUS ONCE
Qty: 0 | Refills: 0 | Status: COMPLETED | OUTPATIENT
Start: 2017-04-29 | End: 2017-04-29

## 2017-04-29 RX ORDER — CALCIUM CARBONATE 500(1250)
3 TABLET ORAL EVERY 6 HOURS
Qty: 0 | Refills: 0 | Status: DISCONTINUED | OUTPATIENT
Start: 2017-04-29 | End: 2017-05-03

## 2017-04-29 RX ORDER — FUROSEMIDE 40 MG
40 TABLET ORAL ONCE
Qty: 0 | Refills: 0 | Status: COMPLETED | OUTPATIENT
Start: 2017-04-29 | End: 2017-04-30

## 2017-04-29 RX ORDER — ACETAMINOPHEN 500 MG
650 TABLET ORAL EVERY 6 HOURS
Qty: 0 | Refills: 0 | Status: DISCONTINUED | OUTPATIENT
Start: 2017-04-29 | End: 2017-05-03

## 2017-04-29 RX ORDER — HYDROMORPHONE HYDROCHLORIDE 2 MG/ML
2 INJECTION INTRAMUSCULAR; INTRAVENOUS; SUBCUTANEOUS ONCE
Qty: 0 | Refills: 0 | Status: DISCONTINUED | OUTPATIENT
Start: 2017-04-29 | End: 2017-04-29

## 2017-04-29 RX ORDER — ZOLPIDEM TARTRATE 10 MG/1
5 TABLET ORAL AT BEDTIME
Qty: 0 | Refills: 0 | Status: DISCONTINUED | OUTPATIENT
Start: 2017-04-29 | End: 2017-05-03

## 2017-04-29 RX ORDER — DOCUSATE SODIUM 100 MG
100 CAPSULE ORAL THREE TIMES A DAY
Qty: 0 | Refills: 0 | Status: DISCONTINUED | OUTPATIENT
Start: 2017-04-29 | End: 2017-05-03

## 2017-04-29 RX ORDER — SODIUM CHLORIDE 9 MG/ML
3 INJECTION INTRAMUSCULAR; INTRAVENOUS; SUBCUTANEOUS EVERY 8 HOURS
Qty: 0 | Refills: 0 | Status: DISCONTINUED | OUTPATIENT
Start: 2017-04-29 | End: 2017-05-03

## 2017-04-29 RX ORDER — CEFEPIME 1 G/1
1000 INJECTION, POWDER, FOR SOLUTION INTRAMUSCULAR; INTRAVENOUS EVERY 12 HOURS
Qty: 0 | Refills: 0 | Status: DISCONTINUED | OUTPATIENT
Start: 2017-04-29 | End: 2017-05-03

## 2017-04-29 RX ORDER — CEFEPIME 1 G/1
1000 INJECTION, POWDER, FOR SOLUTION INTRAMUSCULAR; INTRAVENOUS ONCE
Qty: 0 | Refills: 0 | Status: COMPLETED | OUTPATIENT
Start: 2017-04-29 | End: 2017-04-29

## 2017-04-29 RX ORDER — HEPARIN SODIUM 5000 [USP'U]/ML
5000 INJECTION INTRAVENOUS; SUBCUTANEOUS EVERY 8 HOURS
Qty: 0 | Refills: 0 | Status: DISCONTINUED | OUTPATIENT
Start: 2017-04-29 | End: 2017-05-03

## 2017-04-29 RX ORDER — CEFEPIME 1 G/1
1000 INJECTION, POWDER, FOR SOLUTION INTRAMUSCULAR; INTRAVENOUS ONCE
Qty: 0 | Refills: 0 | Status: DISCONTINUED | OUTPATIENT
Start: 2017-04-29 | End: 2017-04-29

## 2017-04-29 RX ORDER — VANCOMYCIN HCL 1 G
1000 VIAL (EA) INTRAVENOUS ONCE
Qty: 0 | Refills: 0 | Status: COMPLETED | OUTPATIENT
Start: 2017-04-29 | End: 2017-04-29

## 2017-04-29 RX ADMIN — HYDROMORPHONE HYDROCHLORIDE 2 MILLIGRAM(S): 2 INJECTION INTRAMUSCULAR; INTRAVENOUS; SUBCUTANEOUS at 19:25

## 2017-04-29 RX ADMIN — CEFEPIME 100 MILLIGRAM(S): 1 INJECTION, POWDER, FOR SOLUTION INTRAMUSCULAR; INTRAVENOUS at 13:08

## 2017-04-29 RX ADMIN — CEFEPIME 100 MILLIGRAM(S): 1 INJECTION, POWDER, FOR SOLUTION INTRAMUSCULAR; INTRAVENOUS at 23:31

## 2017-04-29 RX ADMIN — HYDROMORPHONE HYDROCHLORIDE 2 MILLIGRAM(S): 2 INJECTION INTRAMUSCULAR; INTRAVENOUS; SUBCUTANEOUS at 18:55

## 2017-04-29 RX ADMIN — ZOLPIDEM TARTRATE 5 MILLIGRAM(S): 10 TABLET ORAL at 23:28

## 2017-04-29 RX ADMIN — Medication 250 MILLIGRAM(S): at 15:30

## 2017-04-29 RX ADMIN — HEPARIN SODIUM 5000 UNIT(S): 5000 INJECTION INTRAVENOUS; SUBCUTANEOUS at 23:29

## 2017-04-29 RX ADMIN — SODIUM CHLORIDE 1000 MILLILITER(S): 9 INJECTION INTRAMUSCULAR; INTRAVENOUS; SUBCUTANEOUS at 12:35

## 2017-04-29 RX ADMIN — SODIUM CHLORIDE 1000 MILLILITER(S): 9 INJECTION INTRAMUSCULAR; INTRAVENOUS; SUBCUTANEOUS at 15:30

## 2017-04-29 RX ADMIN — Medication 100 MILLIGRAM(S): at 23:28

## 2017-04-29 RX ADMIN — SODIUM CHLORIDE 3 MILLILITER(S): 9 INJECTION INTRAMUSCULAR; INTRAVENOUS; SUBCUTANEOUS at 23:33

## 2017-04-29 NOTE — H&P ADULT - NSHPLABSRESULTS_GEN_ALL_CORE
EKG: ST at 115  occas PVC  no dynamic ST-T changes  no significant change when I compared to one 08-    CXR  + L sided mediport  + raquel fixation  no acute infiltrate    Dopplers both LE patent    non-contrast CT chest: Interval development of multiple small groundglass opacities throughout   both lungs without localized airspace consolidation. Findings may be   infectious, inflammatory, or related to drug toxicity.

## 2017-04-29 NOTE — ED PROVIDER NOTE - MUSCULOSKELETAL, MLM
Spine appears normal, range of motion is not limited, no muscle or joint tenderness. 2+ pitting edema.

## 2017-04-29 NOTE — ED PROVIDER NOTE - PROGRESS NOTE DETAILS
Concerned for possibility of CHF, hold on 30cc/kg of fluid. Concerned for possibility of CHF, hold on 30cc/kg of fluid bolus for now. ED attending Dr. Mclean updated pt and pts family member of lab and test results. Pedal edema and SOB, concerned for possibility of CHF, hold on fluid bolus. Pedal edema and SOB, concerned for active CHF, hold on fluid bolus. ED attending Dr. Mclean discussed with pt results of CT chest. Suggested to get CT chest with contrast. Pt does not want contrast. Risks and benefits discussed. Plan: CT chest noncon to look for source of infection. Pt SOB improved after fluid bolus. Given elevated lactate will continue with more fluid resuscitation at this time. Attending Mclean, Pt SOB improved after fluid bolus. Given elevated lactate will continue with more fluid resuscitation at this time. ED attending Dr. Mclean discussed with pt about CT chest. Suggested to get CT chest with contrast. Pt does not want contrast. Risks and benefits discussed. Plan: CT chest non-con to look for source of infection.

## 2017-04-29 NOTE — H&P ADULT - NSHPSOCIALHISTORY_GEN_ALL_CORE
lives in house containing an apt for her and another apt for sister    no alcohol    quit smoking years ago

## 2017-04-29 NOTE — H&P ADULT - PMH
Cervical ca    Omphalocele  hx of multiple abdominal surgeries  SBO (small bowel obstruction)  04-24 - 04-  Spinal surgery in prior 3 months  August 2016

## 2017-04-29 NOTE — H&P ADULT - ATTENDING COMMENTS
discussed admitting to 1N vs telemetry    needs better met on 1N for now;  will see result of lasix in AM and results of other labs and consults

## 2017-04-29 NOTE — H&P ADULT - NSHPPHYSICALEXAM_GEN_ALL_CORE
Vital Signs Last 24 Hrs  T(C): 36.7, Max: 38.3 (04-29 @ 17:05)  T(F): 98.1, Max: 101 (04-29 @ 17:05)  HR: 105 (88 - 133)  BP: 119/74 (105/60 - 125/75)  BP(mean): --  RR: 24 (18 - 24)  SpO2: 97% (97% - 100%)    Somewhat fatigued appearing female in NAD  wearing NC  gen: alert Ox3 w normal speech  HEENT: pupils reactive, sclera anicteric  normal mucosa  Neck: no JVD or bruit  Chest + L anterior chest w mediport palpable; old surgical scar noted  prominent sternum, nontender  Breast deferred  Cor RR S1+S2  ABD + multiple old surg scars + bowel sounds, nontender exam, some firmness R sided ? hepatomegaly  MS: nontender muscles  Extrem: 2+ pitting pretibial edema bilateral lower extremities, no cords  skin: warm and dry, no rash Vital Signs Last 24 Hrs  T(C): 36.7, Max: 38.3 (04-29 @ 17:05)  T(F): 98.1, Max: 101 (04-29 @ 17:05)  HR: 105 (88 - 133)  BP: 119/74 (105/60 - 125/75)  BP(mean): --  RR: 24 (18 - 24)  SpO2: 97% (97% - 100%)    Somewhat fatigued appearing female in NAD  wearing NC  gen: alert Ox3 w normal speech  HEENT: pupils reactive, sclera anicteric  normal mucosa  Neck: no JVD or bruit  Chest + L anterior chest w mediport palpable; old surgical scar noted  prominent sternum, nontender  Breast deferred  Cor RR S1+S2  ABD + multiple old surg scars + bowel sounds, nontender exam, some firmness R sided ? hepatomegaly  MS: nontender muscles  Extrem: 2+ pitting pretibial edema bilateral lower extremities, no cords  skin: warm and dry, no rash  /vag/rectal deferred as not indicated for current complaint

## 2017-04-29 NOTE — PATIENT PROFILE ADULT. - VISION (WITH CORRECTIVE LENSES IF THE PATIENT USUALLY WEARS THEM):
Partially impaired: cannot see medication labels or newsprint, but can see obstacles in path, and the surrounding layout; can count fingers at arm's length/use glasses

## 2017-04-29 NOTE — H&P ADULT - HISTORY OF PRESENT ILLNESS
56 yr old female w  metastatic cerv CA(treated w cisplatin and taxol and RT to pelvis) to bone (s/p emergency fixation for pathologic fx ), recent enlarged lobe of liver (presented at conference ; follicular hyperplasia w dysfunction),  S/P recent SBO discharged from  04-27 presented to  ED complaining of SOB that progressed since Friday evening w pedal edema       Patient was very SOB this morning and sister insisted that they return to  ED.  In ED given IV fluids, cultured and placed on cefepime, vanco       Had temp to 101 here and was unaware of fever.     Denied any headache, palpitations, chest pain, back pain, abd pain, N,V, D     Old chart reviewed echo  LV normal size, contraction w LVEF 60-65%  tr MR, tr TR

## 2017-04-29 NOTE — ED PROVIDER NOTE - CARE PLAN
Principal Discharge DX:	Dyspnea, unspecified type  Secondary Diagnosis:	Malignant neoplasm of cervix, unspecified site

## 2017-04-29 NOTE — H&P ADULT - NSHPOUTPATIENTPROVIDERS_GEN_ALL_CORE
Dr. Phoenix Ochoa   PCP  Dr. Sarmad MOYER  Onc  Dr. Ward Rios Dr. Phoenix Ochoa   PCP I left message  Dr. Bañuelos   ID  Onc  Dr. Ward Rios

## 2017-04-29 NOTE — H&P ADULT - ASSESSMENT
6 yr old female w  metastatic cerv CA(treated w cisplatin and taxol last on  and RT to pelvis) to bone (s/p emergency fixation for pathologic fx ), recent enlarged lobe of liver (presented at conference ; follicular hyperplasia w dysfunction),  s/p recent SBO discharged from  04-27 presented to  ED complaining of new onset SOB and pedal edema    1) SOB  A) no prior hx of CHF  B) does have elevated BNP and high lactate  C) and low albumin  D) prior hx RT to pelvis  E) enlarged lobe of liver  F) has opacities on CT scan possible PNA w GPC vs GNR   1. will give lasix in AM and repeat BNP hours later at 12 noon  2. continue O2, sat >95%  3. check TSH  4. f/u cultures  5. check procalcitonin  6. continue cefepime (got cefepime and vanco in ED)  7. card to see  8. ID to see  9. repeat lactate in AM    2) Pedal edema  A) large contribution is albumin of 2.1  B) follow exam  c) sister relates prior hx pedal edema after chemo in past  1. management as above    3) thrombocytopenia  A) may be sequested in enlarged liver  1. will monitor on sq heparin    4) met SCC to bone s/p spine fixation for pathologic fx  A) pain managment   1. comfortable on oral diluadid    5) had small ketones in UA  A) given IVF in ED  B) taking po fluids when I was at bedside  C) no urinary complaints    6) recent SBO  A) no return of N,V,abd pain 6 yr old female w  metastatic cerv CA(treated w cisplatin and taxol last on  and RT to pelvis) to bone (s/p emergency fixation for pathologic fx ), recent enlarged lobe of liver (presented at conference ; follicular hyperplasia w dysfunction),  s/p recent SBO discharged from  04-27 presented to  ED complaining of new onset SOB and pedal edema    1) SOB  A) no prior hx of CHF  B) does have elevated BNP and high lactate  C) and low albumin  D) prior hx RT to pelvis  E) enlarged lobe of liver  F) has opacities on CT scan possible PNA w GPC vs GNR   1. will give lasix in AM and repeat BNP hours later at 12 noon  2. continue O2, sat >95%  3. check TSH  4. f/u cultures  5. check procalcitonin  6. continue cefepime (got cefepime and vanco in ED)  7. card to see  8. ID to see  9. repeat lactate in AM    2) Pedal edema  A) large contribution is albumin of 2.1  B) follow exam  c) sister relates prior hx pedal edema after chemo in past  1. management as above    3) thrombocytopenia  A) may be sequested in enlarged liver  1. will monitor on sq heparin    4) met SCC to bone s/p spine fixation for pathologic fx  A) pain managment   1. comfortable on oral diluadid    5) had small ketones in UA  A) given IVF in ED  B) taking po fluids when I was at bedside  C) no urinary complaints    6) recent SBO  A) no return of N,V,abd pain  1. follow exam    7) VTE   sq heparin

## 2017-04-29 NOTE — ED PROVIDER NOTE - NS ED MD SCRIBE ATTENDING SCRIBE SECTIONS
REVIEW OF SYSTEMS/PAST MEDICAL/SURGICAL/SOCIAL HISTORY/PROGRESS NOTE/RESULTS/DISPOSITION/PHYSICAL EXAM/VITAL SIGNS( Pullset)/HISTORY OF PRESENT ILLNESS

## 2017-04-29 NOTE — ED ADULT NURSE NOTE - OBJECTIVE STATEMENT
Pt is c/o sob.  Recent admission to  for SBO, d/c 2 days ago; pt with bilat edema to ankles which was noted yesterday.  Pt is febrile, ls clear.  Pt ca, mets to bone, last chemo January 2017.

## 2017-04-29 NOTE — ED PROVIDER NOTE - OBJECTIVE STATEMENT
55 y/o female with PMHx of metastatic cervical CA to bones, last chemo January 2017, arthritis presents to the ED c/o SOB starting last night, worsening this morning. Pt was here last Sunday, admitted for an SBO (cleared on its own with NG tube) and d/c on Thursday. Not on home O2. Notes increasing pedal edema since d/c on Thursday (x2 days). Ambulates with walker. Denies productive cough, fever, orthopnea, chest pain. No hx of lung disease. Former smoker, quit August 2016. Denies alcohol use. PMD Dr. Ochoa. Heme Onc Dr. Coombs

## 2017-04-29 NOTE — H&P ADULT - PSH
Encounter for central line placement  has mediport L ant chest  H/O hernia repair    Personal history of spine surgery  August 2016

## 2017-04-29 NOTE — ED ADULT NURSE REASSESSMENT NOTE - NS ED NURSE REASSESS COMMENT FT1
pt given tylenol 1gram for fever 101.
pt is difficult IV stick, unable to get cultures initially.  lab called for 2nd draw.
Report taken at the change of shift. pt awake alert and oriented x4 resting comfortably in bed with no acute distress noted. pt tolerating Po intake (ginger ale) at this time. Vitals stable. Afebrile. Agree with previous RN's assessment. denies cp,sob,ha,dz,n/v/d/fever/chills or urinary sx. Will cont to monitor for safey and comfort.

## 2017-04-30 LAB
ANION GAP SERPL CALC-SCNC: 13 MMOL/L — SIGNIFICANT CHANGE UP (ref 5–17)
BASE EXCESS BLDCOV CALC-SCNC: -1.9 — SIGNIFICANT CHANGE UP
BUN SERPL-MCNC: 12 MG/DL — SIGNIFICANT CHANGE UP (ref 7–23)
CALCIUM SERPL-MCNC: 6.1 MG/DL — CRITICAL LOW (ref 8.5–10.1)
CHLORIDE SERPL-SCNC: 97 MMOL/L — SIGNIFICANT CHANGE UP (ref 96–108)
CO2 SERPL-SCNC: 20 MMOL/L — LOW (ref 22–31)
CREAT SERPL-MCNC: 0.48 MG/DL — LOW (ref 0.5–1.3)
GAS PNL BLDCOV: 7.46 — HIGH (ref 7.25–7.45)
GLUCOSE SERPL-MCNC: 79 MG/DL — SIGNIFICANT CHANGE UP (ref 70–99)
HCO3 BLDCOV-SCNC: 21 MMOL/L — SIGNIFICANT CHANGE UP (ref 17–25)
HCT VFR BLD CALC: 32.1 % — LOW (ref 34.5–45)
HGB BLD-MCNC: 10.6 G/DL — LOW (ref 11.5–15.5)
LACTATE SERPL-SCNC: 4.3 MMOL/L — CRITICAL HIGH (ref 0.7–2)
LDH SERPL L TO P-CCNC: 746 U/L — HIGH (ref 50–242)
MCHC RBC-ENTMCNC: 29 PG — SIGNIFICANT CHANGE UP (ref 27–34)
MCHC RBC-ENTMCNC: 33.1 GM/DL — SIGNIFICANT CHANGE UP (ref 32–36)
MCV RBC AUTO: 87.7 FL — SIGNIFICANT CHANGE UP (ref 80–100)
NT-PROBNP SERPL-SCNC: 2583 PG/ML — HIGH (ref 0–125)
PCO2 BLDCOV: 29 MMHG — SIGNIFICANT CHANGE UP (ref 27–49)
PLATELET # BLD AUTO: 104 K/UL — LOW (ref 150–400)
PO2 BLDCOA: 178 MMHG — HIGH (ref 17–41)
POTASSIUM SERPL-MCNC: 3.3 MMOL/L — LOW (ref 3.5–5.3)
POTASSIUM SERPL-SCNC: 3.3 MMOL/L — LOW (ref 3.5–5.3)
PROCALCITONIN SERPL-MCNC: 8.62 NG/ML — HIGH (ref 0–0.05)
RBC # BLD: 3.66 M/UL — LOW (ref 3.8–5.2)
RBC # FLD: 17.2 % — HIGH (ref 10.3–14.5)
SAO2 % BLDCOV: 99 % — HIGH (ref 20–75)
SODIUM SERPL-SCNC: 130 MMOL/L — LOW (ref 135–145)
TSH SERPL-MCNC: 1.7 UIU/ML — SIGNIFICANT CHANGE UP (ref 0.36–3.74)
WBC # BLD: 16.1 K/UL — HIGH (ref 3.8–10.5)
WBC # FLD AUTO: 16.1 K/UL — HIGH (ref 3.8–10.5)

## 2017-04-30 PROCEDURE — 71275 CT ANGIOGRAPHY CHEST: CPT | Mod: 26

## 2017-04-30 RX ORDER — MORPHINE SULFATE 50 MG/1
1 CAPSULE, EXTENDED RELEASE ORAL ONCE
Qty: 0 | Refills: 0 | Status: DISCONTINUED | OUTPATIENT
Start: 2017-04-30 | End: 2017-04-30

## 2017-04-30 RX ORDER — MORPHINE SULFATE 50 MG/1
2 CAPSULE, EXTENDED RELEASE ORAL ONCE
Qty: 0 | Refills: 0 | Status: DISCONTINUED | OUTPATIENT
Start: 2017-04-30 | End: 2017-04-30

## 2017-04-30 RX ORDER — HYDROMORPHONE HYDROCHLORIDE 2 MG/ML
2 INJECTION INTRAMUSCULAR; INTRAVENOUS; SUBCUTANEOUS ONCE
Qty: 0 | Refills: 0 | Status: DISCONTINUED | OUTPATIENT
Start: 2017-04-30 | End: 2017-04-30

## 2017-04-30 RX ORDER — METOPROLOL TARTRATE 50 MG
25 TABLET ORAL
Qty: 0 | Refills: 0 | Status: DISCONTINUED | OUTPATIENT
Start: 2017-04-30 | End: 2017-05-03

## 2017-04-30 RX ORDER — POTASSIUM CHLORIDE 20 MEQ
40 PACKET (EA) ORAL ONCE
Qty: 0 | Refills: 0 | Status: COMPLETED | OUTPATIENT
Start: 2017-04-30 | End: 2017-04-30

## 2017-04-30 RX ADMIN — Medication 25 MILLIGRAM(S): at 17:32

## 2017-04-30 RX ADMIN — HYDROMORPHONE HYDROCHLORIDE 2 MILLIGRAM(S): 2 INJECTION INTRAMUSCULAR; INTRAVENOUS; SUBCUTANEOUS at 09:42

## 2017-04-30 RX ADMIN — MORPHINE SULFATE 2 MILLIGRAM(S): 50 CAPSULE, EXTENDED RELEASE ORAL at 14:45

## 2017-04-30 RX ADMIN — HEPARIN SODIUM 5000 UNIT(S): 5000 INJECTION INTRAVENOUS; SUBCUTANEOUS at 06:18

## 2017-04-30 RX ADMIN — MORPHINE SULFATE 1 MILLIGRAM(S): 50 CAPSULE, EXTENDED RELEASE ORAL at 20:25

## 2017-04-30 RX ADMIN — HEPARIN SODIUM 5000 UNIT(S): 5000 INJECTION INTRAVENOUS; SUBCUTANEOUS at 21:28

## 2017-04-30 RX ADMIN — Medication 650 MILLIGRAM(S): at 06:23

## 2017-04-30 RX ADMIN — Medication 40 MILLIEQUIVALENT(S): at 10:00

## 2017-04-30 RX ADMIN — SODIUM CHLORIDE 3 MILLILITER(S): 9 INJECTION INTRAMUSCULAR; INTRAVENOUS; SUBCUTANEOUS at 14:37

## 2017-04-30 RX ADMIN — ZOLPIDEM TARTRATE 5 MILLIGRAM(S): 10 TABLET ORAL at 21:28

## 2017-04-30 RX ADMIN — HEPARIN SODIUM 5000 UNIT(S): 5000 INJECTION INTRAVENOUS; SUBCUTANEOUS at 14:46

## 2017-04-30 RX ADMIN — CEFEPIME 100 MILLIGRAM(S): 1 INJECTION, POWDER, FOR SOLUTION INTRAMUSCULAR; INTRAVENOUS at 17:26

## 2017-04-30 RX ADMIN — MORPHINE SULFATE 2 MILLIGRAM(S): 50 CAPSULE, EXTENDED RELEASE ORAL at 12:55

## 2017-04-30 RX ADMIN — SODIUM CHLORIDE 3 MILLILITER(S): 9 INJECTION INTRAMUSCULAR; INTRAVENOUS; SUBCUTANEOUS at 21:26

## 2017-04-30 RX ADMIN — HYDROMORPHONE HYDROCHLORIDE 2 MILLIGRAM(S): 2 INJECTION INTRAMUSCULAR; INTRAVENOUS; SUBCUTANEOUS at 10:15

## 2017-04-30 RX ADMIN — SODIUM CHLORIDE 3 MILLILITER(S): 9 INJECTION INTRAMUSCULAR; INTRAVENOUS; SUBCUTANEOUS at 06:19

## 2017-04-30 RX ADMIN — CEFEPIME 100 MILLIGRAM(S): 1 INJECTION, POWDER, FOR SOLUTION INTRAMUSCULAR; INTRAVENOUS at 09:41

## 2017-04-30 RX ADMIN — MORPHINE SULFATE 1 MILLIGRAM(S): 50 CAPSULE, EXTENDED RELEASE ORAL at 19:55

## 2017-04-30 RX ADMIN — Medication 40 MILLIGRAM(S): at 06:18

## 2017-04-30 NOTE — CONSULT NOTE ADULT - SUBJECTIVE AND OBJECTIVE BOX
Patient is a 56y old  Female who presents with a chief complaint of having difficulty breathing (2017 23:45)      HPI:  56 yr old female w  metastatic cerv CA(treated w cisplatin and taxol and RT to pelvis) to bone (s/p emergency fixation for pathologic fx ), recent enlarged lobe of liver (presented at conference ; follicular hyperplasia w dysfunction),  S/P recent SBO discharged from   admitted  with worsening sob since Friday evening along with pedal edema, denies cough, denies chills but here febrile to 101, elevated wbc ct to 16.1, CT with scattered groundglass opacities concerning for infection vs inflammation vs drug toxicity, pt was given IV vancomycin/cefepime d/t concern of infection.            PMH: as above    PSH: as above    Meds: per reconciliation sheet, noted below    MEDICATIONS  (STANDING):  heparin  Injectable 5000Unit(s) SubCutaneous every 8 hours  sodium chloride 0.9% lock flush 3milliLiter(s) IV Push every 8 hours  docusate sodium 100milliGRAM(s) Oral three times a day  cefepime  IVPB 1000milliGRAM(s) IV Intermittent every 12 hours      Allergies    No Known Allergies    Intolerances        Social: no smoking, no alcohol, no illegal drugs; no recent travel, no exposure to TB    Family history:  No pertinent family history in first degree relatives      ROS:  no HA, no dizziness, no sore throat, no blurry vision, no CP, no palpitations, no cough, no abdominal pain, no diarrhea, no N/V, no dysuria, no leg pain, no claudication, no rash, no joint aches, no rectal pain or bleeding, no night sweats    Vital Signs Last 24 Hrs  T(C): 37.4, Max: 38.3 ( @ 17:05)  T(F): 99.3, Max: 101 ( @ 17:05)  HR: 122 (88 - 133)  BP: 117/63 (105/60 - 125/75)  BP(mean): --  RR: 19 (18 - 24)  SpO2: 95% (95% - 100%)      PE:  Constitutional: frail looking  HEENT: NC/AT, EOMI, PERRLA  Neck: supple  Back: no tenderness  Respiratory: decreased breath sounds  Cardiovascular: S1S2 regular, no murmurs  Abdomen: soft, not tender, not distended, positive BS  Genitourinary: deferred  Rectal: deferred  Musculoskeletal: no muscle tenderness, no joint swelling or tenderness  Extremities: +pedal edema  Neurological:  no focal deficits  Skin: no rashes    Labs:                        10.6   16.1  )-----------( 104      ( 2017 06:16 )             32.1     04-30    130<L>  |  97  |  12  ----------------------------<  79  3.3<L>   |  20<L>  |  0.48<L>    Ca    6.1<LL>      2017 06:16    TPro  5.7<L>  /  Alb  2.1<L>  /  TBili  3.2<H>  /  DBili  x   /  AST  247<H>  /  ALT  97<H>  /  AlkPhos  476<H>       LIVER FUNCTIONS - ( 2017 12:35 )  Alb: 2.1 g/dL / Pro: 5.7 gm/dL / ALK PHOS: 476 U/L / ALT: 97 U/L / AST: 247 U/L / GGT: x           Urinalysis Basic - ( 2017 15:10 )    Color: Genie / Appearance: very cloudy / S.020 / pH: x  Gluc: x / Ketone: Trace  / Bili: Moderate / Urobili: 4 mg/dL   Blood: x / Protein: 30 mg/dL / Nitrite: Negative   Leuk Esterase: Trace / RBC: 0-2 /HPF / WBC 0-2   Sq Epi: x / Non Sq Epi: Occasional / Bacteria: Many            Radiology:EXAM:  US DPLX LWR EXT VEINS COMPL BI                            PROCEDURE DATE:  2017        INTERPRETATION:  US DPLX LWR EXT VEINS COMPL BI    HISTORY:  Shortness of breath, metastatic cervical CA, bilateral leg   swelling with history of metastatic cancer    Comparison: None.    Technique: Using gray scale with and without compression, color, and   spectral Doppler evaluation of the deep veins of the right and left leg,   the study was performed.    There is no sonographic evidence fordeep vein thrombosis in the in the   common femoral, femoral, and popliteal veins of the right and left leg.   The veins are patent compressible with normal venous waveforms and   augmentation. Calf veins are patent to the level of the proximal   posterior tibial vein.    IMPRESSION: Normal lower extremity venous Doppler of the femoral and   popliteal veins of the right and left leg to the level of the proximal   posterior tibial vein.    EXAM:  CT CHEST                            PROCEDURE DATE:  2017        INTERPRETATION:  CT CHEST    HISTORY:  fever and shortness of breath            TECHNIQUE: Noncontrast CT of the chest was performed. Coronal and   sagittal images were reconstructed. This study was performed using   automatic exposure control (radiation dose reduction software) to obtain   a diagnostic image quality scan with patient dose as low as reasonably   achievable.    COMPARISON: Chest x-ray from the same day, chest CT 2016    FINDINGS:    LUNGS, AIRWAYS: The central airways are patent. Interval development of   multiple faint groundglass opacities throughout both lungs. No localized   consolidation. Stable bilateral pulmonary nodules including a 3 mmnodule   in the anterior left upper lobe (series 3 image 29).    PLEURA: No pleural abnormality.    HEART AND VESSELS: Normal heart size. No pericardial effusion. Minimal   coronary artery calcification is noted. Ectatic ascending thoracic aorta.   Left chest wall infusion port is seen with the catheter tip in the SVC.     MEDIASTINUM AND VLADIMIR: No adenopathy.    UPPER ABDOMEN: Limited visualization again shows enlargement of the left   lobe of the liver.    BONES AND SOFT TISSUES: Extensive spinal fusion hardware.    IMPRESSION:     Interval development of multiple small groundglass opacities throughout   both lungs without localized airspace consolidation. Findings may be   infectious, inflammatory, or related to drug toxicity.    Advanced directives addressed: full resuscitation

## 2017-04-30 NOTE — DIETITIAN INITIAL EVALUATION ADULT. - PERTINENT LABORATORY DATA
4/29: Albumin: 2.1 (L), 4/30: Hgb/Hct: 10.6/32.1, Na+: 130 (L), K+: 3.3 (L), Creat: 0.48 (L), Ca+: 6.1 (L)

## 2017-04-30 NOTE — CONSULT NOTE ADULT - ASSESSMENT
Dyspnea  r/o PE  Possible PNA  Hypoxemia  Ectopy noted on tele in ER. may be related to hypoxemia  Coronary calcification seen on CT chest  No ACS; doubt CHF clinically  Hypoalbuminemia may be contributing to tissue and pulm edema  Metastatic Cervical CA    Suggest:    O2 supplement  CT chest with IV contrast to evaluate - r/o PE and lung findings seen on the CT chest.  Echo in AM  ABx  Beta blockers  ? tele transfer  Overall prognosis is guarded.  Conservative cardiac care is suggested

## 2017-04-30 NOTE — DIETITIAN INITIAL EVALUATION ADULT. - OTHER INFO
Patient recently stopped radiation and chemo, reports no change in her appetite or weight loss. 24 Hour Recall: Breakfast: Egg sandwich, Lunch: Soup, Dinner: chicken and broccoli

## 2017-04-30 NOTE — CONSULT NOTE ADULT - ASSESSMENT
56 yr old female w  metastatic cerv CA(treated w cisplatin and taxol and RT to pelvis) to bone (s/p emergency fixation for pathologic fx ), recent enlarged lobe of liver (presented at conference ; follicular hyperplasia w dysfunction),  S/P recent SBO discharged from  04-27 admitted 4/29 with worsening sob since Friday evening along with pedal edema, denies cough, denies chills but here febrile to 101, elevated wbc ct to 16.1, CT with scattered groundglass opacities concerning for infection vs inflammation vs drug toxicity, pt was given IV vancomycin/cefepime d/t concern of infection.    1. febrile syndrome/leukocytosis/sob/possible pna/immunocompromised host    - CT with scattered groundglass opacities + fever + wbc ct ? probable pna and chf findings d/t hepatic congestion from enlarged liver ?    - no recent chemo to suggest drug toxicity as recently discharged from hospital    - other concern is PE?     - LE dopplers wnl    - would hold off on further vancomycin    - continue with IV cefepime [id number] for gram (-) resistant coverage day#2    - f/u cultures    - consider CT angio r/o PE if resp status not improving or remains dyspneic     -supportive care    - monitor temps    -tolerating abx well so far; no side effects noted    -reason for abx use and side effects reviewed with patient    - f/u CBC

## 2017-04-30 NOTE — CONSULT NOTE ADULT - SUBJECTIVE AND OBJECTIVE BOX
Patient is a 56y old  Female who presents with a chief complaint of having difficulty breathing (2017 23:45)    HPI: 56 yr old female w  metastatic cervical  CA (treated w cisplatin and taxol and RT to pelvis) mets to bone (s/p emergency fixation for pathologic fx ), recent enlarged lobe of liver. S/P recent SBO discharged from   presented to  ED complaining of sudden onset SOB that has been progressive. Increasing pedal edema over the last few weeks. Had temp to 101 here and was unaware of fever.     PAST MEDICAL & SURGICAL HISTORY:  Omphalocele: hx of multiple abdominal surgeries  SBO (small bowel obstruction):  - 2017  Cervical ca  Spinal surgery in prior 3 months: 2016  Encounter for central line placement: has mediport L ant chest  H/O hernia repair  Personal history of spine surgery: 2016      PREVIOUS CARDIAC WORKUP:   Echo: LV normal size, contraction w LVEF 60-65%, tr MR, tr TR     ALLERGIES:    No Known Allergies       MEDICATIONS  (STANDING):  heparin  Injectable 5000Unit(s) SubCutaneous every 8 hours  sodium chloride 0.9% lock flush 3milliLiter(s) IV Push every 8 hours  docusate sodium 100milliGRAM(s) Oral three times a day  cefepime  IVPB 1000milliGRAM(s) IV Intermittent every 12 hours    MEDICATIONS  (PRN):  calcium carbonate 500 mG (Tums) Chewable 3Tablet(s) Chew every 6 hours PRN Dyspepsia  zolpidem 5milliGRAM(s) Oral at bedtime PRN Insomnia  acetaminophen   Tablet 650milliGRAM(s) Oral every 6 hours PRN For Temp greater than 38 C (100.4 F)      FAMILY HISTORY:  No pertinent family history in first degree relatives      SOCIAL HISTORY:  Nonsmoker. No ETOH abuse. No illicit drugs.     ROS:     Detailed ten system ROS was performed and was negative except for history as eluded to above.    + fever  no chills  no nausea  no vomiting  no diarrhea  no constipation  no melena  no hematochezia  no chest pain  no palpitations  + sob  + dyspnea  no cough  no wheezing  no anorexia  no headache  no dizziness  no syncope  no weakness  no myalgia  no dysuria  no polyuria  no hematuria   + edema    Vital Signs Last 24 Hrs  T(C): 37.4, Max: 38.3 ( @ 17:05)  T(F): 99.3, Max: 101 ( @ 17:05)  HR: 122 (88 - 133)  BP: 117/63 (105/60 - 125/75)  BP(mean): --  RR: 19 (18 - 24)  SpO2: 95% (95% - 100%)    I&O's Summary      PHYSICAL EXAM:    General Appearance:    Comfortable, AAO X 3, in no distress. Palor  HEENT:                       Atraumatic, PERRLA, EOMI, conjunctiva clear.   Neck:                          Supple, no adenopathy, no thyromegaly, no JVD, no carotid bruit  Chest:                         Clear to auscultation, no wheezes, rales or rhonchi, symmetric air entry, no tachypnea, retractions or cyanosis  Heart:                          S1, S2 normal, ectopy notedno gallop, no murmur.  Abdomen:                    Soft, non-tender, bowel sounds active. No palpable masses.   Extremities:                 no cyanosis, 2+ edema, no joint swelling. Peripheral pulses normal  Skin:                           Skin color, texture normal. No rashes   Neurologic:                  Alert and oriented X3, cranial nerves intact, sensory and motor normal.      TELEMETRY:  While in ER. Frequent PACs and PVCs, occasional fusion complexes    ECG:  Sinus tachycardia. PACs, PVCs    LABS:                          10.6   16.1  )-----------( 104      ( 2017 06:16 )             32.1     04-30    130<L>  |  97  |  12  ----------------------------<  79  3.3<L>   |  20<L>  |  0.48<L>    Ca    6.1<LL>      2017 06:16    TPro  5.7<L>  /  Alb  2.1<L>  /  TBili  3.2<H>  /  DBili  x   /  AST  247<H>  /  ALT  97<H>  /  AlkPhos  476<H>   @ 14:15  Trop-I  <0.015  CK      --  CK-MB   --     @ 12:35  Trop-I  <0.015  CK      --  CK-MB   --     @ 02:52  Trop-I  <0.015  CK      --  CK-MB   --    04-23 @ 23:53  Trop-I  <0.015  CK      --  CK-MB   --     @ 20:13  Trop-I  <0.015  CK      --  CK-MB   --    Pro BNP  1966  @ 12:35    Pro BNP  1122  @ 20:13    PT/INR - ( 2017 12:35 )   PT: 16.1 sec;   INR: 1.48 ratio    PTT - ( 2017 12:35 )  PTT:33.0 sec    Urinalysis Basic - ( 2017 15:10 )    Color: Genie / Appearance: very cloudy / S.020 / pH: x  Gluc: x / Ketone: Trace  / Bili: Moderate / Urobili: 4 mg/dL   Blood: x / Protein: 30 mg/dL / Nitrite: Negative   Leuk Esterase: Trace / RBC: 0-2 /HPF / WBC 0-2   Sq Epi: x / Non Sq Epi: Occasional / Bacteria: Many      ABG - ( 2017 22:11 )  pH: 7.49  /  pCO2: 25    /  pO2: 78    / HCO3: 19    / Base Excess: -3    /  SaO2: 95          RADIOLOGY & ADDITIONAL STUDIES:    CT chest (non contrast): Ectatic ascending aorta. Interval development of multiple small groundglass opacities throughout both lungs without localized airspace consolidation.    Venous Doppler LE: No DVT

## 2017-04-30 NOTE — PROGRESS NOTE ADULT - SUBJECTIVE AND OBJECTIVE BOX
Patient complaining of pain, will administer 2mg dilaudid oral once. Defer to primary team for long term management.

## 2017-04-30 NOTE — PROGRESS NOTE ADULT - SUBJECTIVE AND OBJECTIVE BOX
· Assessment		  56 yr old female w  metastatic cerv CA(treated w cisplatin and taxol last on  and RT to pelvis) to bone (s/p emergency fixation for pathologic fx ), recent enlarged lobe of liver (presented at conference ; follicular hyperplasia w dysfunction),  s/p recent SBO discharged from  04-27 presented to  ED complaining of new onset SOB and pedal edema    1) SOB  A) no prior hx of CHF  B) does have elevated BNP and high lactate  C) and low albumin  D) prior hx RT to pelvis  E) enlarged lobe of liver  F) has opacities on CT scan possible PNA w GPC vs GNR   1. will give lasix in AM and repeat BNP hours later at 12 noon  2. continue O2, sat >95%  3. check TSH  4. f/u cultures  5. check procalcitonin  6. continue cefepime (got cefepime and vanco in ED)  7. card to see  8. ID to see  9. repeat lactate in AM    2) Pedal edema  A) large contribution is albumin of 2.1  B) follow exam  c) sister relates prior hx pedal edema after chemo in past  1. management as above    3) thrombocytopenia  A) may be sequested in enlarged liver  1. will monitor on sq heparin    4) met SCC to bone s/p spine fixation for pathologic fx  A) pain managment   1. comfortable on oral diluadid    5) had small ketones in UA  A) given IVF in ED  B) taking po fluids when I was at bedside  C) no urinary complaints    6) recent SBO  A) no return of N,V,abd pain  1. follow exam    7) VTE   sq heparin CHIEF COMPLAINT:    SUBJECTIVE:     REVIEW OF SYSTEMS:    CONSTITUTIONAL: No weakness, fevers or chills  EYES/ENT: No visual changes;  No vertigo or throat pain   NECK: No pain or stiffness  RESPIRATORY: No cough, wheezing, hemoptysis; No shortness of breath  CARDIOVASCULAR: No chest pain or palpitations  GASTROINTESTINAL: No abdominal or epigastric pain. No nausea, vomiting, or hematemesis; No diarrhea or constipation. No melena or hematochezia.  GENITOURINARY: No dysuria, frequency or hematuria  NEUROLOGICAL: No numbness or weakness  SKIN: No itching, burning, rashes, or lesions   All other review of systems is negative unless indicated above    Vital Signs Last 24 Hrs  T(C): 36.6, Max: 38.3 (04-29 @ 17:05)  T(F): 97.8, Max: 101 (04-29 @ 17:05)  HR: 112 (88 - 122)  BP: 95/56 (95/56 - 119/74)  BP(mean): --  RR: 20 (18 - 24)  SpO2: 96% (95% - 100%)    I&O's Summary      CAPILLARY BLOOD GLUCOSE      PHYSICAL EXAM:    Constitutional: NAD, awake and alert, well-developed  HEENT: PERR, EOMI, Normal Hearing, MMM  Neck: Soft and supple, No LAD, No JVD  Respiratory: Breath sounds are clear bilaterally, No wheezing, rales or rhonchi  Cardiovascular: S1 and S2, regular rate and rhythm, no Murmurs, gallops or rubs  Gastrointestinal: Bowel Sounds present, soft, nontender, nondistended, no guarding, no rebound  Extremities: No peripheral edema  Vascular: 2+ peripheral pulses  Neurological: A/O x 3, no focal deficits  Musculoskeletal: 5/5 strength b/l upper and lower extremities  Skin: No rashes    MEDICATIONS:  MEDICATIONS  (STANDING):  heparin  Injectable 5000Unit(s) SubCutaneous every 8 hours  sodium chloride 0.9% lock flush 3milliLiter(s) IV Push every 8 hours  docusate sodium 100milliGRAM(s) Oral three times a day  cefepime  IVPB 1000milliGRAM(s) IV Intermittent every 12 hours  metoprolol 25milliGRAM(s) Oral two times a day      LABS: All Labs Reviewed:                        10.6   16.1  )-----------( 104      ( 30 Apr 2017 06:16 )             32.1     04-30    130<L>  |  97  |  12  ----------------------------<  79  3.3<L>   |  20<L>  |  0.48<L>    Ca    6.1<LL>      30 Apr 2017 06:16    TPro  5.7<L>  /  Alb  2.1<L>  /  TBili  3.2<H>  /  DBili  x   /  AST  247<H>  /  ALT  97<H>  /  AlkPhos  476<H>  04-29    PT/INR - ( 29 Apr 2017 12:35 )   PT: 16.1 sec;   INR: 1.48 ratio         PTT - ( 29 Apr 2017 12:35 )  PTT:33.0 sec  CARDIAC MARKERS ( 29 Apr 2017 14:15 )  <0.015 ng/mL / x     / x     / x     / x      CARDIAC MARKERS ( 29 Apr 2017 12:35 )  <0.015 ng/mL / x     / x     / x     / x                Assessment		  56 yr old female w  metastatic cerv CA(treated w cisplatin and taxol last on  and RT to pelvis) to bone (s/p emergency fixation for pathologic fx ), recent enlarged lobe of liver (presented at conference ; follicular hyperplasia w dysfunction), hx of rt pelvis radiation,  s/p recent SBO discharged from  04-27 presented to  ED complaining of new onset SOB and pedal edema    1) SOB likely secodnary to PNA gram negative/atyical vs Pulmonary embolism  - evaluated by cardiology   -Echo ordered  - patient sent for cta chest to r/o pulmonary embolism given her hx of cancer  -patient is on abx, will c/w this, cefepime  -Lactic acid acidosis likley seconary to underlying malignancy vs metabolic acidosis   -Lac 5.2 on admit > 4.4 > repeat pending  -wbc 15.8 > 16.1         2) Pedal edema- chf vs hypoalbuminemia   -echo pending  -c/w optimial nutrition as patient tolerates  -nutritional consult      3)hyponatremia- possibly hypvolemic hyponatremia  second poor oral intake  -will c/w monitoring  -likley chronic in nature    4)hypokalemia-supplemented  -repat bmp in am    5) thrombocytopenia- likley secondary to malignancy and bm suppression      6) met SCC to bone s/p spine fixation for pathologic fx  -pain managmeent  -percocet and ivp morhpine as needed    7) recent SBO  A) no return of N,V,abd pain  1. follow exam    8) VTE   sq heparin Diagnosis: sepsis/ Pneumonia    SUBJECTIVE: no complaints    Objective: nad, axox3    REVIEW OF SYSTEMS:    CONSTITUTIONAL: No weakness, fevers or chills  EYES/ENT: No visual changes;  No vertigo or throat pain   NECK: No pain or stiffness  RESPIRATORY: No cough, wheezing, hemoptysis; No shortness of breath  CARDIOVASCULAR: No chest pain or palpitations  GASTROINTESTINAL: No abdominal or epigastric pain. No nausea, vomiting, or hematemesis; No diarrhea or constipation. No melena or hematochezia.  GENITOURINARY: No dysuria, frequency or hematuria  NEUROLOGICAL: No numbness or weakness  SKIN: No itching, burning, rashes, or lesions   All other review of systems is negative unless indicated above    Vital Signs Last 24 Hrs  T(C): 36.6, Max: 38.3 (04-29 @ 17:05)  T(F): 97.8, Max: 101 (04-29 @ 17:05)  HR: 112 (88 - 122)  BP: 95/56 (95/56 - 119/74)  BP(mean): --  RR: 20 (18 - 24)  SpO2: 96% (95% - 100%)    I&O's Summary      CAPILLARY BLOOD GLUCOSE      PHYSICAL EXAM:    Constitutional: NAD, awake and alert, well-developed  HEENT: PERR, EOMI, Normal Hearing, MMM  Neck: Soft and supple, No LAD, No JVD  Respiratory: Breath sounds are clear bilaterally, No wheezing, rales or rhonchi  Cardiovascular: S1 and S2, regular rate and rhythm, no Murmurs, gallops or rubs  Gastrointestinal: Bowel Sounds present, soft, nontender, nondistended, no guarding, no rebound  Extremities: No peripheral edema  Vascular: 2+ peripheral pulses  Neurological: A/O x 3, no focal deficits  Musculoskeletal: 5/5 strength b/l upper and lower extremities  Skin: No rashes    MEDICATIONS:  MEDICATIONS  (STANDING):  heparin  Injectable 5000Unit(s) SubCutaneous every 8 hours  sodium chloride 0.9% lock flush 3milliLiter(s) IV Push every 8 hours  docusate sodium 100milliGRAM(s) Oral three times a day  cefepime  IVPB 1000milliGRAM(s) IV Intermittent every 12 hours  metoprolol 25milliGRAM(s) Oral two times a day      LABS: All Labs Reviewed:                        10.6   16.1  )-----------( 104      ( 30 Apr 2017 06:16 )             32.1     04-30    130<L>  |  97  |  12  ----------------------------<  79  3.3<L>   |  20<L>  |  0.48<L>    Ca    6.1<LL>      30 Apr 2017 06:16    TPro  5.7<L>  /  Alb  2.1<L>  /  TBili  3.2<H>  /  DBili  x   /  AST  247<H>  /  ALT  97<H>  /  AlkPhos  476<H>  04-29    PT/INR - ( 29 Apr 2017 12:35 )   PT: 16.1 sec;   INR: 1.48 ratio         PTT - ( 29 Apr 2017 12:35 )  PTT:33.0 sec  CARDIAC MARKERS ( 29 Apr 2017 14:15 )  <0.015 ng/mL / x     / x     / x     / x      CARDIAC MARKERS ( 29 Apr 2017 12:35 )  <0.015 ng/mL / x     / x     / x     / x                Assessment		  56 yr old female w  metastatic cerv CA(treated w cisplatin and taxol last on  and RT to pelvis) to bone (s/p emergency fixation for pathologic fx ), recent enlarged lobe of liver (presented at conference ; follicular hyperplasia w dysfunction), hx of rt pelvis radiation,  s/p recent SBO discharged from  04-27 presented to  ED complaining of new onset SOB and pedal edema    1) SOB likely secodnary to PNA gram negative/atyical vs Pulmonary embolism  - evaluated by cardiology   -Echo ordered  - patient sent for cta chest to r/o pulmonary embolism  and there is no evidence of PE  -patient is on abx, will c/w this, cefepime  -Lactic acid acidosis likley seconary to underlying malignancy vs metabolic acidosis   -Lac 5.2 on admit > 4.4 > 4.3 ; the lactic acid is likley secondary to the undelrying malignancy.  She is not acidotic.   -wbc 15.8 > 16.1         2) Pedal edema- chf vs hypoalbuminemia   -echo pending  -c/w optimial nutrition as patient tolerates  -nutritional consult      3)hyponatremia- possibly hypvolemic hyponatremia  second poor oral intake  -will c/w monitoring  -likley chronic in nature    4)hypokalemia-supplemented  -repat bmp in am    5) thrombocytopenia- likley secondary to malignancy and bm suppression      6) met SCC to bone s/p spine fixation for pathologic fx  -pain managmeent  -percocet and ivp morhpine as needed    7) recent SBO  A) no return of N,V,abd pain  1. follow exam    8) VTE   sq heparin

## 2017-05-01 DIAGNOSIS — Z92.3 PERSONAL HISTORY OF IRRADIATION: ICD-10-CM

## 2017-05-01 DIAGNOSIS — R10.9 UNSPECIFIED ABDOMINAL PAIN: ICD-10-CM

## 2017-05-01 DIAGNOSIS — Z87.891 PERSONAL HISTORY OF NICOTINE DEPENDENCE: ICD-10-CM

## 2017-05-01 DIAGNOSIS — E83.51 HYPOCALCEMIA: ICD-10-CM

## 2017-05-01 DIAGNOSIS — C53.0 MALIGNANT NEOPLASM OF ENDOCERVIX: ICD-10-CM

## 2017-05-01 DIAGNOSIS — K76.89 OTHER SPECIFIED DISEASES OF LIVER: ICD-10-CM

## 2017-05-01 DIAGNOSIS — R00.0 TACHYCARDIA, UNSPECIFIED: ICD-10-CM

## 2017-05-01 DIAGNOSIS — Z92.21 PERSONAL HISTORY OF ANTINEOPLASTIC CHEMOTHERAPY: ICD-10-CM

## 2017-05-01 DIAGNOSIS — E46 UNSPECIFIED PROTEIN-CALORIE MALNUTRITION: ICD-10-CM

## 2017-05-01 DIAGNOSIS — E83.42 HYPOMAGNESEMIA: ICD-10-CM

## 2017-05-01 DIAGNOSIS — E87.6 HYPOKALEMIA: ICD-10-CM

## 2017-05-01 DIAGNOSIS — K56.60 UNSPECIFIED INTESTINAL OBSTRUCTION: ICD-10-CM

## 2017-05-01 DIAGNOSIS — N13.5 CROSSING VESSEL AND STRICTURE OF URETER WITHOUT HYDRONEPHROSIS: ICD-10-CM

## 2017-05-01 DIAGNOSIS — M17.9 OSTEOARTHRITIS OF KNEE, UNSPECIFIED: ICD-10-CM

## 2017-05-01 DIAGNOSIS — C79.51 SECONDARY MALIGNANT NEOPLASM OF BONE: ICD-10-CM

## 2017-05-01 DIAGNOSIS — K59.09 OTHER CONSTIPATION: ICD-10-CM

## 2017-05-01 PROCEDURE — 93306 TTE W/DOPPLER COMPLETE: CPT | Mod: 26

## 2017-05-01 PROCEDURE — 71010: CPT | Mod: 26

## 2017-05-01 RX ORDER — BUDESONIDE, MICRONIZED 100 %
0.5 POWDER (GRAM) MISCELLANEOUS
Qty: 0 | Refills: 0 | Status: DISCONTINUED | OUTPATIENT
Start: 2017-05-01 | End: 2017-05-03

## 2017-05-01 RX ORDER — VANCOMYCIN HCL 1 G
1000 VIAL (EA) INTRAVENOUS ONCE
Qty: 0 | Refills: 0 | Status: COMPLETED | OUTPATIENT
Start: 2017-05-01 | End: 2017-05-01

## 2017-05-01 RX ORDER — IPRATROPIUM/ALBUTEROL SULFATE 18-103MCG
3 AEROSOL WITH ADAPTER (GRAM) INHALATION EVERY 6 HOURS
Qty: 0 | Refills: 0 | Status: DISCONTINUED | OUTPATIENT
Start: 2017-05-01 | End: 2017-05-03

## 2017-05-01 RX ORDER — ONDANSETRON 8 MG/1
4 TABLET, FILM COATED ORAL ONCE
Qty: 0 | Refills: 0 | Status: COMPLETED | OUTPATIENT
Start: 2017-05-01 | End: 2017-05-01

## 2017-05-01 RX ORDER — MORPHINE SULFATE 50 MG/1
2 CAPSULE, EXTENDED RELEASE ORAL ONCE
Qty: 0 | Refills: 0 | Status: DISCONTINUED | OUTPATIENT
Start: 2017-05-01 | End: 2017-05-01

## 2017-05-01 RX ORDER — AZITHROMYCIN 500 MG/1
TABLET, FILM COATED ORAL
Qty: 0 | Refills: 0 | Status: DISCONTINUED | OUTPATIENT
Start: 2017-05-01 | End: 2017-05-03

## 2017-05-01 RX ORDER — SODIUM CHLORIDE 9 MG/ML
500 INJECTION INTRAMUSCULAR; INTRAVENOUS; SUBCUTANEOUS ONCE
Qty: 0 | Refills: 0 | Status: COMPLETED | OUTPATIENT
Start: 2017-05-01 | End: 2017-05-01

## 2017-05-01 RX ORDER — MORPHINE SULFATE 50 MG/1
2 CAPSULE, EXTENDED RELEASE ORAL EVERY 6 HOURS
Qty: 0 | Refills: 0 | Status: DISCONTINUED | OUTPATIENT
Start: 2017-05-01 | End: 2017-05-03

## 2017-05-01 RX ORDER — AZITHROMYCIN 500 MG/1
500 TABLET, FILM COATED ORAL EVERY 24 HOURS
Qty: 0 | Refills: 0 | Status: DISCONTINUED | OUTPATIENT
Start: 2017-05-02 | End: 2017-05-03

## 2017-05-01 RX ORDER — MORPHINE SULFATE 50 MG/1
1 CAPSULE, EXTENDED RELEASE ORAL ONCE
Qty: 0 | Refills: 0 | Status: DISCONTINUED | OUTPATIENT
Start: 2017-05-01 | End: 2017-05-01

## 2017-05-01 RX ORDER — SODIUM CHLORIDE 9 MG/ML
1000 INJECTION INTRAMUSCULAR; INTRAVENOUS; SUBCUTANEOUS
Qty: 0 | Refills: 0 | Status: DISCONTINUED | OUTPATIENT
Start: 2017-05-01 | End: 2017-05-02

## 2017-05-01 RX ORDER — AZITHROMYCIN 500 MG/1
500 TABLET, FILM COATED ORAL ONCE
Qty: 0 | Refills: 0 | Status: COMPLETED | OUTPATIENT
Start: 2017-05-01 | End: 2017-05-01

## 2017-05-01 RX ADMIN — ZOLPIDEM TARTRATE 5 MILLIGRAM(S): 10 TABLET ORAL at 23:52

## 2017-05-01 RX ADMIN — Medication 100 MILLIGRAM(S): at 06:06

## 2017-05-01 RX ADMIN — Medication 100 MILLIGRAM(S): at 22:00

## 2017-05-01 RX ADMIN — SODIUM CHLORIDE 3 MILLILITER(S): 9 INJECTION INTRAMUSCULAR; INTRAVENOUS; SUBCUTANEOUS at 15:25

## 2017-05-01 RX ADMIN — Medication 25 MILLIGRAM(S): at 06:06

## 2017-05-01 RX ADMIN — CEFEPIME 100 MILLIGRAM(S): 1 INJECTION, POWDER, FOR SOLUTION INTRAMUSCULAR; INTRAVENOUS at 06:06

## 2017-05-01 RX ADMIN — HEPARIN SODIUM 5000 UNIT(S): 5000 INJECTION INTRAVENOUS; SUBCUTANEOUS at 06:06

## 2017-05-01 RX ADMIN — SODIUM CHLORIDE 3 MILLILITER(S): 9 INJECTION INTRAMUSCULAR; INTRAVENOUS; SUBCUTANEOUS at 05:40

## 2017-05-01 RX ADMIN — SODIUM CHLORIDE 3 MILLILITER(S): 9 INJECTION INTRAMUSCULAR; INTRAVENOUS; SUBCUTANEOUS at 21:50

## 2017-05-01 RX ADMIN — MORPHINE SULFATE 2 MILLIGRAM(S): 50 CAPSULE, EXTENDED RELEASE ORAL at 10:52

## 2017-05-01 RX ADMIN — SODIUM CHLORIDE 500 MILLILITER(S): 9 INJECTION INTRAMUSCULAR; INTRAVENOUS; SUBCUTANEOUS at 09:16

## 2017-05-01 RX ADMIN — MORPHINE SULFATE 1 MILLIGRAM(S): 50 CAPSULE, EXTENDED RELEASE ORAL at 01:09

## 2017-05-01 RX ADMIN — Medication 0.5 MILLIGRAM(S): at 19:23

## 2017-05-01 RX ADMIN — Medication 40 MILLIGRAM(S): at 18:39

## 2017-05-01 RX ADMIN — CEFEPIME 100 MILLIGRAM(S): 1 INJECTION, POWDER, FOR SOLUTION INTRAMUSCULAR; INTRAVENOUS at 20:51

## 2017-05-01 RX ADMIN — Medication 100 MILLIGRAM(S): at 16:42

## 2017-05-01 RX ADMIN — MORPHINE SULFATE 2 MILLIGRAM(S): 50 CAPSULE, EXTENDED RELEASE ORAL at 22:56

## 2017-05-01 RX ADMIN — AZITHROMYCIN 255 MILLIGRAM(S): 500 TABLET, FILM COATED ORAL at 11:38

## 2017-05-01 RX ADMIN — MORPHINE SULFATE 2 MILLIGRAM(S): 50 CAPSULE, EXTENDED RELEASE ORAL at 16:42

## 2017-05-01 RX ADMIN — MORPHINE SULFATE 2 MILLIGRAM(S): 50 CAPSULE, EXTENDED RELEASE ORAL at 17:25

## 2017-05-01 RX ADMIN — Medication 250 MILLIGRAM(S): at 11:39

## 2017-05-01 RX ADMIN — MORPHINE SULFATE 1 MILLIGRAM(S): 50 CAPSULE, EXTENDED RELEASE ORAL at 06:16

## 2017-05-01 RX ADMIN — MORPHINE SULFATE 1 MILLIGRAM(S): 50 CAPSULE, EXTENDED RELEASE ORAL at 01:39

## 2017-05-01 RX ADMIN — ONDANSETRON 4 MILLIGRAM(S): 8 TABLET, FILM COATED ORAL at 06:43

## 2017-05-01 RX ADMIN — MORPHINE SULFATE 2 MILLIGRAM(S): 50 CAPSULE, EXTENDED RELEASE ORAL at 09:34

## 2017-05-01 RX ADMIN — HEPARIN SODIUM 5000 UNIT(S): 5000 INJECTION INTRAVENOUS; SUBCUTANEOUS at 16:43

## 2017-05-01 RX ADMIN — SODIUM CHLORIDE 500 MILLILITER(S): 9 INJECTION INTRAMUSCULAR; INTRAVENOUS; SUBCUTANEOUS at 09:55

## 2017-05-01 RX ADMIN — MORPHINE SULFATE 1 MILLIGRAM(S): 50 CAPSULE, EXTENDED RELEASE ORAL at 06:46

## 2017-05-01 RX ADMIN — SODIUM CHLORIDE 80 MILLILITER(S): 9 INJECTION INTRAMUSCULAR; INTRAVENOUS; SUBCUTANEOUS at 11:39

## 2017-05-01 RX ADMIN — Medication 3 MILLILITER(S): at 19:20

## 2017-05-01 NOTE — CONSULT NOTE ADULT - SUBJECTIVE AND OBJECTIVE BOX
HPI:    pat seen & examine & full consult dictated.      PAST MEDICAL & SURGICAL HISTORY:  Omphalocele: hx of multiple abdominal surgeries  SBO (small bowel obstruction):  - 2017  Cervical ca  Spinal surgery in prior 3 months: 2016  Encounter for central line placement: has mediport L ant chest  H/O hernia repair  Personal history of spine surgery: 2016      MEDICATIONS  (STANDING):  heparin  Injectable 5000Unit(s) SubCutaneous every 8 hours  sodium chloride 0.9% lock flush 3milliLiter(s) IV Push every 8 hours  docusate sodium 100milliGRAM(s) Oral three times a day  cefepime  IVPB 1000milliGRAM(s) IV Intermittent every 12 hours  metoprolol 25milliGRAM(s) Oral two times a day  vancomycin  IVPB 1000milliGRAM(s) IV Intermittent once  azithromycin  IVPB  IV Intermittent   azithromycin  IVPB 500milliGRAM(s) IV Intermittent once  sodium chloride 0.9%. 1000milliLiter(s) IV Continuous <Continuous>  methylPREDNISolone sodium succinate Injectable 40milliGRAM(s) IV Push two times a day    MEDICATIONS  (PRN):  calcium carbonate 500 mG (Tums) Chewable 3Tablet(s) Chew every 6 hours PRN Dyspepsia  zolpidem 5milliGRAM(s) Oral at bedtime PRN Insomnia  acetaminophen   Tablet 650milliGRAM(s) Oral every 6 hours PRN For Temp greater than 38 C (100.4 F)      Allergies    No Known Allergies    Intolerances        SOCIAL HISTORY: Denies tobacco, etoh abuse or illicit drug use    FAMILY HISTORY:  No pertinent family history in first degree relatives      Vital Signs Last 24 Hrs  T(C): 36.6, Max: 36.9 (- @ 05:45)  T(F): 97.9, Max: 98.5 (05- @ 05:45)  HR: 97 (97 - 114)  BP: 81/22 (81/22 - 107/61)  BP(mean): --  RR: 24 (18 - 24)  SpO2: 96% (94% - 98%)    REVIEW OF SYSTEMS:    CONSTITUTIONAL:  As per HPI.  HEENT:  Eyes:  No diplopia or blurred vision. ENT:  No earache, sore throat or runny nose.  CARDIOVASCULAR:  No pressure, squeezing, tightness, heaviness or aching about the chest, neck, axilla or epigastrium.  RESPIRATORY:  No cough, shortness of breath, PND or orthopnea.  GASTROINTESTINAL:  No nausea, vomiting or diarrhea.  GENITOURINARY:  No dysuria, frequency or urgency.  MUSCULOSKELETAL:  As per HPI.  SKIN:  No change in skin, hair or nails.  NEUROLOGIC:  No paresthesias, fasciculations, seizures or weakness.  PSYCHIATRIC:  No disorder of thought or mood.  ENDOCRINE:  No heat or cold intolerance, polyuria or polydipsia.  HEMATOLOGICAL:  No easy bruising or bleedings:  .     PHYSICAL EXAMINATION:    GENERAL APPEARANCE:  Pt. is not currently dyspneic, in no distress. Pt. is alert, oriented, and pleasant.  HEENT:  Pupils are normal and react normally. No icterus. Mucous membranes well colored.  NECK:  Supple. No lymphadenopathy. Jugular venous pressure not elevated. Carotids equal.   HEART:   The cardiac impulse has a normal quality. Regular. Normal S1 and S2. There are no murmurs, rubs or gallops noted  CHEST:  Chest crackles @ bases with few rhonchi auscultation. Normal respiratory effort.  ABDOMEN:  Soft and nontender.   EXTREMITIES:  There is no cyanosis, clubbing or edema.   SKIN:  No rash or significant lesions are noted.    LABS:                        10.8   18.6  )-----------( 97       ( 01 May 2017 06:09 )             32.6     05-01    130<L>  |  95<L>  |  15  ----------------------------<  78  3.2<L>   |  19<L>  |  0.51    Ca    6.2<LL>      01 May 2017 06:09    TPro  5.7<L>  /  Alb  2.1<L>  /  TBili  3.2<H>  /  DBili  x   /  AST  247<H>  /  ALT  97<H>  /  AlkPhos  476<H>      LIVER FUNCTIONS - ( 2017 12:35 )  Alb: 2.1 g/dL / Pro: 5.7 gm/dL / ALK PHOS: 476 U/L / ALT: 97 U/L / AST: 247 U/L / GGT: x           PT/INR - ( 2017 12:35 )   PT: 16.1 sec;   INR: 1.48 ratio         PTT - ( 2017 12:35 )  PTT:33.0 sec  CARDIAC MARKERS ( 2017 14:15 )  <0.015 ng/mL / x     / x     / x     / x      CARDIAC MARKERS ( 2017 12:35 )  <0.015 ng/mL / x     / x     / x     / x          Urinalysis Basic - ( 2017 15:10 )    Color: Genie / Appearance: very cloudy / S.020 / pH: x  Gluc: x / Ketone: Trace  / Bili: Moderate / Urobili: 4 mg/dL   Blood: x / Protein: 30 mg/dL / Nitrite: Negative   Leuk Esterase: Trace / RBC: 0-2 /HPF / WBC 0-2   Sq Epi: x / Non Sq Epi: Occasional / Bacteria: Many      ABG - ( 2017 22:11 )  pH: 7.49  /  pCO2: 25    /  pO2: 78    / HCO3: 19    / Base Excess: -3    /  SaO2: 95          RADIOLOGY & ADDITIONAL STUDIES:     CT Angiography of the chestIMPRESSION:    1.  No evidence of pulmonary emboli    2.  scattered nodular and groundglass opacities in both lungs unchanged   from the prior study, likely infectious or inflammatory.

## 2017-05-01 NOTE — PROGRESS NOTE ADULT - SUBJECTIVE AND OBJECTIVE BOX
CHIEF COMPLAINT:    SUBJECTIVE:     REVIEW OF SYSTEMS:    CONSTITUTIONAL: No weakness, fevers or chills  EYES/ENT: No visual changes;  No vertigo or throat pain   NECK: No pain or stiffness  RESPIRATORY: No cough, wheezing, hemoptysis; No shortness of breath  CARDIOVASCULAR: No chest pain or palpitations  GASTROINTESTINAL: No abdominal or epigastric pain. No nausea, vomiting, or hematemesis; No diarrhea or constipation. No melena or hematochezia.  GENITOURINARY: No dysuria, frequency or hematuria  NEUROLOGICAL: No numbness or weakness  SKIN: No itching, burning, rashes, or lesions   All other review of systems is negative unless indicated above    Vital Signs Last 24 Hrs  T(C): 36.3, Max: 36.9 (05-01 @ 05:45)  T(F): 97.3, Max: 98.5 (05-01 @ 05:45)  HR: 92 (92 - 114)  BP: 95/49 (81/22 - 107/61)  BP(mean): --  RR: 22 (18 - 24)  SpO2: 91% (91% - 98%)    I&O's Summary    I & Os for current day (as of 01 May 2017 17:41)  =============================================  IN: 0 ml / OUT: 1 ml / NET: -1 ml      CAPILLARY BLOOD GLUCOSE      PHYSICAL EXAM:    Constitutional: NAD, awake and alert, well-developed  HEENT: PERR, EOMI, Normal Hearing, MMM  Neck: Soft and supple, No LAD, No JVD  Respiratory: Breath sounds are clear bilaterally, No wheezing, rales or rhonchi  Cardiovascular: S1 and S2, regular rate and rhythm, no Murmurs, gallops or rubs  Gastrointestinal: Bowel Sounds present, soft, nontender, nondistended, no guarding, no rebound  Extremities: No peripheral edema  Vascular: 2+ peripheral pulses  Neurological: A/O x 3, no focal deficits  Musculoskeletal: 5/5 strength b/l upper and lower extremities  Skin: No rashes    MEDICATIONS:  MEDICATIONS  (STANDING):  heparin  Injectable 5000Unit(s) SubCutaneous every 8 hours  sodium chloride 0.9% lock flush 3milliLiter(s) IV Push every 8 hours  docusate sodium 100milliGRAM(s) Oral three times a day  cefepime  IVPB 1000milliGRAM(s) IV Intermittent every 12 hours  metoprolol 25milliGRAM(s) Oral two times a day  azithromycin  IVPB  IV Intermittent   sodium chloride 0.9%. 1000milliLiter(s) IV Continuous <Continuous>  methylPREDNISolone sodium succinate Injectable 40milliGRAM(s) IV Push two times a day  morphine  - Injectable 2milliGRAM(s) IV Push once      LABS: All Labs Reviewed:                        10.8   18.6  )-----------( 97       ( 01 May 2017 06:09 )             32.6     05-01    130<L>  |  95<L>  |  15  ----------------------------<  78  3.2<L>   |  19<L>  |  0.51    Ca    6.2<LL>      01 May 2017 06:09            Blood Culture: 04-29 @ 12:48  Organism --  Gram Stain Blood -- Gram Stain --  Specimen Source .Blood None  Culture-Blood --    04-29 @ 12:35  Organism --  Gram Stain Blood -- Gram Stain --  Specimen Source .Blood Blood-Peripheral  Culture-Blood --        Assessment and plan: 		  56 yr old female w pmh of copd (ex smoker quit 2016 1ppd) ,  metastatic cerv CA(treated w cisplatin and taxol last on  and RT to pelvis) to bone (s/p emergency fixation for pathologic fx ), recent enlarged lobe of liver (presented at conference ; follicular hyperplasia w dysfunction), hx of rt pelvis radiation,  s/p recent SBO discharged from  04-27 presented to  ED complaining of new onset SOB and pedal edema    1) SOB likely secodnary to PNA gram negative/atyical  w/ co-existing Acute copd exacerbation  - evaluated by cardiology   -Pulmonary consult  -Echo ordered  - patient sent for cta chest to r/o pulmonary embolism  and there is no evidence of PE  -patient is on abx,  and being followed by infectious disease. Abx azithromycin and cefepime  -Lactic acid acidosis likley seconary to underlying malignancy vs metabolic acidosis   -Lac 5.2 on admit > 4.4 > 4.3 ; the lactic acid is likley secondary to the undelrying malignancy.  She is not acidotic.   -wbc 15.8 > 16.1   -start solumederol 40 iv q12h  -c/w nebs        2) Pedal edema- chf vs hypoalbuminemia   -echo pending  -c/w optimial nutrition as patient tolerates  -nutritional consult      3)hyponatremia- possibly hypvolemic hyponatremia  second poor oral intake  -will c/w monitoring  -likley chronic in nature    4)hypokalemia-supplemented  -repat bmp in am    5) thrombocytopenia- likley secondary to malignancy and bm suppression      6) met SCC to bone s/p spine fixation for pathologic fx  -pain managmeent  -percocet and ivp morhpine as needed    7) recent SBO  A) no return of N,V,abd pain  1. follow exam    8) VTE   sq heparin

## 2017-05-01 NOTE — CHART NOTE - NSCHARTNOTEFT_GEN_A_CORE
Patient found to be SOB with tachycardia.    EKG shows sinus tachycardia with PVCs.    CXR: no signs of pleural effusions, awaiting official read.  Patient given morphine 1mg for SOB and knee pain.

## 2017-05-01 NOTE — PROGRESS NOTE ADULT - ASSESSMENT
56 yr old female w  metastatic cerv CA(treated w cisplatin and taxol and RT to pelvis) to bone (s/p emergency fixation for pathologic fx ), recent enlarged lobe of liver (presented at conference ; follicular hyperplasia w dysfunction),  S/P recent SBO discharged from  04-27 admitted 4/29 with worsening sob since Friday evening along with pedal edema, denies cough, denies chills but here febrile to 101, elevated wbc ct to 16.1, CT with scattered groundglass opacities concerning for infection vs inflammation vs drug toxicity, pt was given IV vancomycin/cefepime d/t concern of infection.    1. sepsis/pna/copd/emphysema/immunocompromised host    - CT with scattered groundglass opacities + fever + wbc ct ? probable pna and chf findings d/t hepatic congestion from enlarged liver vs lymphangitic spread     - no recent chemo to suggest drug toxicity as recently discharged from hospital    - PE ruled out by CT angio and LE dopplers (-)    - continue with IV cefepime [id number] for gram (-) resistant coverage day#3    - start IV vancomycin 1 gm X 1 then [id number], check trough prior to 4th dose for mrsa coverage and IV azithromycin 500mg q24h    - blood cx no growth    - started on IV steroids per pulm    - likely anxiety component also contributing  -supportive care    - monitor temps    -tolerating abx well so far; no side effects noted    -reason for abx use and side effects reviewed with patient    - f/u CBC

## 2017-05-01 NOTE — PROGRESS NOTE ADULT - SUBJECTIVE AND OBJECTIVE BOX
HPI: 56 yr old female w  metastatic cervical  CA (treated w cisplatin and taxol and RT to pelvis) mets to bone (s/p emergency fixation for pathologic fx ), recent enlarged lobe of liver. S/P recent SBO discharged from   presented to  ED complaining of sudden onset SOB that has been progressive. Increasing pedal edema over the last few weeks. Had temp to 101 here and was unaware of fever.     PAST MEDICAL & SURGICAL HISTORY:  Omphalocele: hx of multiple abdominal surgeries  SBO (small bowel obstruction):  - 2017  Cervical ca  Spinal surgery in prior 3 months: 2016  Encounter for central line placement: has mediport L ant chest  H/O hernia repair  Personal history of spine surgery: 2016      PREVIOUS CARDIAC WORKUP:   Echo: LV normal size, contraction w LVEF 60-65%, tr MR, tr TR       CURRENT CARDIAC WORKUP:   Echo Pending       Allergies:   No Known Allergies      MEDICATIONS  (STANDING):  heparin  Injectable 5000Unit(s) SubCutaneous every 8 hours  sodium chloride 0.9% lock flush 3milliLiter(s) IV Push every 8 hours  docusate sodium 100milliGRAM(s) Oral three times a day  cefepime  IVPB 1000milliGRAM(s) IV Intermittent every 12 hours  metoprolol 25milliGRAM(s) Oral two times a day  vancomycin  IVPB 1000milliGRAM(s) IV Intermittent once  azithromycin  IVPB  IV Intermittent   azithromycin  IVPB 500milliGRAM(s) IV Intermittent once  sodium chloride 0.9%. 1000milliLiter(s) IV Continuous <Continuous>  methylPREDNISolone sodium succinate Injectable 40milliGRAM(s) IV Push two times a day    MEDICATIONS  (PRN):  calcium carbonate 500 mG (Tums) Chewable 3Tablet(s) Chew every 6 hours PRN Dyspepsia  zolpidem 5milliGRAM(s) Oral at bedtime PRN Insomnia  acetaminophen   Tablet 650milliGRAM(s) Oral every 6 hours PRN For Temp greater than 38 C (100.4 F)      ROS:     Detailed ten system ROS was performed and was negative except for history as eluded to above.    + fever  no chills  no nausea  no vomiting  no diarrhea  no constipation  no melena  no hematochezia  no chest pain  no palpitations  + sob  + dyspnea  no cough  no wheezing  no anorexia  no headache  no dizziness  no syncope  no weakness  no myalgia  no dysuria  no polyuria  no hematuria   + edema      Vital Signs Last 24 Hrs  T(C): 36.6, Max: 36.9 ( @ 05:45)  T(F): 97.9, Max: 98.5 ( @ 05:45)  HR: 97 (97 - 114)  BP: 81/22 (81/22 - 107/61)  BP(mean): --  RR: 24 (18 - 24)  SpO2: 96% (94% - 98%)    I&O's Summary    I & Os for current day (as of 01 May 2017 10:19)  =============================================  IN: 0 ml / OUT: 1 ml / NET: -1 ml      PHYSICAL EXAM:    General Appearance:    Comfortable, AAO X 3, mild resp distress. Palor  HEENT:                       Atraumatic, PERRLA, EOMI, conjunctiva clear.   Neck:                          Supple, no adenopathy, no thyromegaly, no JVD, no carotid bruit  Chest:                         b/l rhonchi, tachypnea.   Heart:                          S1, S2 normal, ectopy notedno gallop, no murmur.  Abdomen:                    Soft, non-tender, bowel sounds active. No palpable masses.   Extremities:                 no cyanosis, 2+ edema, no joint swelling. Peripheral pulses normal  Skin:                           Skin color, texture normal. No rashes   Neurologic:                  Alert and oriented X3, cranial nerves intact, sensory and motor normal.      TELEMETRY:  While in ER. Frequent PACs and PVCs, occasional fusion complexes    ECG:  Sinus tachycardia. PACs, PVCs    LABS:                        10.8   18.6  )-----------( 97       ( 01 May 2017 06:09 )             32.6     05-01    130<L>  |  95<L>  |  15  ----------------------------<  78  3.2<L>   |  19<L>  |  0.51    Ca    6.2<LL>      01 May 2017 06:09    TPro  5.7<L>  /  Alb  2.1<L>  /  TBili  3.2<H>  /  DBili  x   /  AST  247<H>  /  ALT  97<H>  /  AlkPhos  476<H>   @ 14:15  Trop-I <0.015  CK     --  CK MB  --     @ 12:35  Trop-I <0.015  CK     --  CK MB  --    Pro BNP  2583  @ 14:14    PT/INR - ( 2017 12:35 )   PT: 16.1 sec;   INR: 1.48 ratio         PTT - ( 2017 12:35 )  PTT:33.0 sec  Urinalysis Basic - ( 2017 15:10 )    Color: Genie / Appearance: very cloudy / S.020 / pH: x  Gluc: x / Ketone: Trace  / Bili: Moderate / Urobili: 4 mg/dL   Blood: x / Protein: 30 mg/dL / Nitrite: Negative   Leuk Esterase: Trace / RBC: 0-2 /HPF / WBC 0-2   Sq Epi: x / Non Sq Epi: Occasional / Bacteria: Many      ABG - ( 2017 22:11 )  pH: 7.49  /  pCO2: 25    /  pO2: 78    / HCO3: 19    / Base Excess: -3    /  SaO2: 95          RADIOLOGY & ADDITIONAL STUDIES:    CTA Chest: No evidence of pulmonary emboli. Scattered nodular and ground-glass opacities in both lungs

## 2017-05-01 NOTE — PROGRESS NOTE ADULT - ASSESSMENT
Dyspnea  Possible PNA  Hypoxemia  Hypokalemia  Ectopy noted on tele in ER. may be related to hypoxemia  Coronary calcification seen on CT chest  No ACS; doubt CHF clinically  Hypoalbuminemia may be contributing to tissue and pulm edema  Metastatic Cervical CA    Suggest:    O2 supplement  Echo today   ABx  Beta blockers  tele transfer  Overall prognosis is guarded.  Conservative cardiac care is suggested  K supplement.  D/w family - daughter and father at bedside

## 2017-05-01 NOTE — CONSULT NOTE ADULT - ASSESSMENT
PROBLEMS:  SOB-secodnary to Pneumonia/scattered nodular and groundglass opacities in both lungs unchanged   from the prior study, likely infectious or inflammatory/lymphangitic spread  COPD/EMPHYSEMA  Metastatic SCC to bone s/p spine fixation for pathologic fx   Recent SBO  Leg swelling    PLAN;    IV ABX  IV STEROIDS  AEROSOL  SUPPORTIVE CARE  PULSE OXIMETRY  dvt PROPHYLASIX

## 2017-05-01 NOTE — PROGRESS NOTE ADULT - SUBJECTIVE AND OBJECTIVE BOX
56 yr old female w  metastatic cerv CA(treated w cisplatin and taxol and RT to pelvis) to bone (s/p emergency fixation for pathologic fx ), recent enlarged lobe of liver (presented at conference ; follicular hyperplasia w dysfunction),  S/P recent SBO discharged from   admitted  with worsening sob since Friday evening along with pedal edema, denies cough, denies chills but here febrile to 101, elevated wbc ct to 16.1, CT with scattered groundglass opacities concerning for infection vs inflammation vs drug toxicity, pt was given IV vancomycin/cefepime d/t concern of infection.        o/n with worsening sob/tachycardia   occasional cough  no fevers    MEDICATIONS  (STANDING):  heparin  Injectable 5000Unit(s) SubCutaneous every 8 hours  sodium chloride 0.9% lock flush 3milliLiter(s) IV Push every 8 hours  docusate sodium 100milliGRAM(s) Oral three times a day  cefepime  IVPB 1000milliGRAM(s) IV Intermittent every 12 hours  metoprolol 25milliGRAM(s) Oral two times a day  azithromycin  IVPB  IV Intermittent   sodium chloride 0.9%. 1000milliLiter(s) IV Continuous <Continuous>  methylPREDNISolone sodium succinate Injectable 40milliGRAM(s) IV Push two times a day      Vital Signs Last 24 Hrs  T(C): 36.3, Max: 36.9 ( @ 05:45)  T(F): 97.3, Max: 98.5 ( @ 05:45)  HR: 97 (97 - 114)  BP: 87/50 (81/22 - 107/61)  BP(mean): --  RR: 22 (18 - 24)  SpO2: 96% (94% - 98%)      PE:  Constitutional: frail looking  HEENT: NC/AT, EOMI, PERRLA  Neck: supple  Back: no tenderness  Respiratory: decreased breath sounds  Cardiovascular: S1S2 regular, no murmurs  Abdomen: soft, not tender, not distended, positive BS  Genitourinary: deferred  Rectal: deferred  Musculoskeletal: no muscle tenderness, no joint swelling or tenderness  Extremities: +pedal edema  Neurological:  no focal deficits  Skin: no rashes    Labs:                        10.8   18.6  )-----------( 97       ( 01 May 2017 06:09 )             32.6     05-    130<L>  |  95<L>  |  15  ----------------------------<  78  3.2<L>   |  19<L>  |  0.51    Ca    6.2<LL>      01 May 2017 06:09    TPro  5.7<L>  /  Alb  2.1<L>  /  TBili  3.2<H>  /  DBili  x   /  AST  247<H>  /  ALT  97<H>  /  AlkPhos  476<H>            Urinalysis Basic - ( 2017 15:10 )    Color: Genie / Appearance: very cloudy / S.020 / pH: x  Gluc: x / Ketone: Trace  / Bili: Moderate / Urobili: 4 mg/dL   Blood: x / Protein: 30 mg/dL / Nitrite: Negative   Leuk Esterase: Trace / RBC: 0-2 /HPF / WBC 0-2   Sq Epi: x / Non Sq Epi: Occasional / Bacteria: Many        Culture - Blood (17 @ 12:48)    Specimen Source: .Blood None    Culture Results:   No growth to date.    Culture - Blood (17 @ 12:35)    Specimen Source: .Blood Blood-Peripheral    Culture Results:   No growth to date.        Radiology:    EXAM:  CHEST SINGLE VIEW FRONTAL                            PROCEDURE DATE:  2017        INTERPRETATION:  Views:1  Comparison: 17  History: Shortness of breath    The lungs are clear of infiltrates and effusions. An implanted port and   spinal fixation hardware are again noted. The cardiac and mediastinal   contours appear unremarkable.     Impression:  1. No evidence of acute pulmonary disease.          EXAM:  CT ANGIO CHEST PE PROTOCOL IC                            PROCEDURE DATE:  2017        INTERPRETATION:  CT Angiography of the chest for the evaluation of   pulmonary emboli           CPT 21044      CLINICAL INFORMATION:  Sudden onset ofdyspnea. Patient has history of   carcinoma        TECHNIQUE: Contiguous axial 1.0 mm sections were obtained through the   chest using single helical acquisition acquired during the rapid bolus   administration of 90 cc of Omnipaque 350. 10 cc were discarded.    This   data set was reconstructed as 5 mm sections through the chest and as   axial and coronal 2 mm sections at higher resolution to evaluate the   pulmonary arteries for thrombus. Maximal intensity projection images were   also obtained.    FINDINGS:   Prior CT of the chest dated 2017 is available for review.    No convincing pulmonary emboli are identified.  This evaluation includes   the main pulmonary artery, its right and left branches, and their   segmental branches. Subsegmental branches are incompletely evaluated but   where seen appear patent.    The lungs are are remarkable for small scattered nodular infiltrates and   groundglass infiltrates in both lungs. His are likely infectious or   inflammatory and are unchanged in comparison to the prior study.  The   trachea and major bronchi appear patent.    No enlarged axillary, hilar or mediastinal lymph nodes are found.   The   heart size is normal.   There is scoliosis rods in the thoracic or lumbar   spine.  There is a small hiatal hernia.  Limited evaluation of the upper abdomen is unremarkable.    IMPRESSION:    1.  No evidence of pulmonary emboli    2.  scattered nodular and groundglass opacities in both lungs unchanged   from the prior study, likely infectious or inflammatory.       EXAM:  US DPLX LWR EXT VEINS COMPL BI                            PROCEDURE DATE:  2017        INTERPRETATION:  US DPLX LWR EXT VEINS COMPL BI    HISTORY:  Shortness of breath, metastatic cervical CA, bilateral leg   swelling with history of metastatic cancer    Comparison: None.    Technique: Using gray scale with and without compression, color, and   spectral Doppler evaluation of the deep veins of the right and left leg,   the study was performed.    There is no sonographic evidence fordeep vein thrombosis in the in the   common femoral, femoral, and popliteal veins of the right and left leg.   The veins are patent compressible with normal venous waveforms and   augmentation. Calf veins are patent to the level of the proximal   posterior tibial vein.    IMPRESSION: Normal lower extremity venous Doppler of the femoral and   popliteal veins of the right and left leg to the level of the proximal   posterior tibial vein.    EXAM:  CT CHEST                            PROCEDURE DATE:  2017        INTERPRETATION:  CT CHEST    HISTORY:  fever and shortness of breath            TECHNIQUE: Noncontrast CT of the chest was performed. Coronal and   sagittal images were reconstructed. This study was performed using   automatic exposure control (radiation dose reduction software) to obtain   a diagnostic image quality scan with patient dose as low as reasonably   achievable.    COMPARISON: Chest x-ray from the same day, chest CT 2016    FINDINGS:    LUNGS, AIRWAYS: The central airways are patent. Interval development of   multiple faint groundglass opacities throughout both lungs. No localized   consolidation. Stable bilateral pulmonary nodules including a 3 mmnodule   in the anterior left upper lobe (series 3 image 29).    PLEURA: No pleural abnormality.    HEART AND VESSELS: Normal heart size. No pericardial effusion. Minimal   coronary artery calcification is noted. Ectatic ascending thoracic aorta.   Left chest wall infusion port is seen with the catheter tip in the SVC.     MEDIASTINUM AND VLADIMIR: No adenopathy.    UPPER ABDOMEN: Limited visualization again shows enlargement of the left   lobe of the liver.    BONES AND SOFT TISSUES: Extensive spinal fusion hardware.    IMPRESSION:     Interval development of multiple small groundglass opacities throughout   both lungs without localized airspace consolidation. Findings may be   infectious, inflammatory, or related to drug toxicity.    Advanced directives addressed: full resuscitation

## 2017-05-02 LAB
ANION GAP SERPL CALC-SCNC: 16 MMOL/L — SIGNIFICANT CHANGE UP (ref 5–17)
ANISOCYTOSIS BLD QL: SLIGHT — SIGNIFICANT CHANGE UP
BASO STIPL BLD QL SMEAR: SLIGHT — SIGNIFICANT CHANGE UP
BASOPHILS # BLD AUTO: 0 K/UL — SIGNIFICANT CHANGE UP (ref 0–0.2)
BASOPHILS NFR BLD AUTO: 0.3 % — SIGNIFICANT CHANGE UP (ref 0–2)
BUN SERPL-MCNC: 16 MG/DL — SIGNIFICANT CHANGE UP (ref 7–23)
CALCIUM SERPL-MCNC: 5.7 MG/DL — CRITICAL LOW (ref 8.5–10.1)
CHLORIDE SERPL-SCNC: 96 MMOL/L — SIGNIFICANT CHANGE UP (ref 96–108)
CO2 SERPL-SCNC: 19 MMOL/L — LOW (ref 22–31)
CREAT SERPL-MCNC: 0.41 MG/DL — LOW (ref 0.5–1.3)
EOSINOPHIL # BLD AUTO: 0 K/UL — SIGNIFICANT CHANGE UP (ref 0–0.5)
EOSINOPHIL NFR BLD AUTO: 0.2 % — SIGNIFICANT CHANGE UP (ref 0–6)
GLUCOSE SERPL-MCNC: 114 MG/DL — HIGH (ref 70–99)
HCT VFR BLD CALC: 29.6 % — LOW (ref 34.5–45)
HGB BLD-MCNC: 10 G/DL — LOW (ref 11.5–15.5)
HYPOCHROMIA BLD QL: SLIGHT — SIGNIFICANT CHANGE UP
LG PLATELETS BLD QL AUTO: SLIGHT — SIGNIFICANT CHANGE UP
LYMPHOCYTES # BLD AUTO: 0.3 K/UL — LOW (ref 1–3.3)
LYMPHOCYTES # BLD AUTO: 1.7 % — LOW (ref 13–44)
MANUAL DIF COMMENT BLD-IMP: SIGNIFICANT CHANGE UP
MCHC RBC-ENTMCNC: 29.6 PG — SIGNIFICANT CHANGE UP (ref 27–34)
MCHC RBC-ENTMCNC: 33.8 GM/DL — SIGNIFICANT CHANGE UP (ref 32–36)
MCV RBC AUTO: 87.4 FL — SIGNIFICANT CHANGE UP (ref 80–100)
MONOCYTES # BLD AUTO: 0.3 K/UL — SIGNIFICANT CHANGE UP (ref 0–0.9)
MONOCYTES NFR BLD AUTO: 2.1 % — SIGNIFICANT CHANGE UP (ref 2–14)
NEUTROPHILS # BLD AUTO: 15 K/UL — HIGH (ref 1.8–7.4)
NEUTROPHILS NFR BLD AUTO: 95.8 % — HIGH (ref 43–77)
PLAT MORPH BLD: NORMAL — SIGNIFICANT CHANGE UP
PLATELET # BLD AUTO: 71 K/UL — LOW (ref 150–400)
PLATELET COUNT - ESTIMATE: (no result)
POIKILOCYTOSIS BLD QL AUTO: SLIGHT — SIGNIFICANT CHANGE UP
POLYCHROMASIA BLD QL SMEAR: SLIGHT — SIGNIFICANT CHANGE UP
POTASSIUM SERPL-MCNC: 3.6 MMOL/L — SIGNIFICANT CHANGE UP (ref 3.5–5.3)
POTASSIUM SERPL-SCNC: 3.6 MMOL/L — SIGNIFICANT CHANGE UP (ref 3.5–5.3)
RBC # BLD: 3.38 M/UL — LOW (ref 3.8–5.2)
RBC # FLD: 18 % — HIGH (ref 10.3–14.5)
RBC BLD AUTO: (no result)
SCHISTOCYTES BLD QL AUTO: SLIGHT — SIGNIFICANT CHANGE UP
SODIUM SERPL-SCNC: 131 MMOL/L — LOW (ref 135–145)
TOXIC GRANULES BLD QL SMEAR: PRESENT — SIGNIFICANT CHANGE UP
WBC # BLD: 15.6 K/UL — HIGH (ref 3.8–10.5)
WBC # FLD AUTO: 15.6 K/UL — HIGH (ref 3.8–10.5)

## 2017-05-02 RX ORDER — ONDANSETRON 8 MG/1
4 TABLET, FILM COATED ORAL EVERY 6 HOURS
Qty: 0 | Refills: 0 | Status: DISCONTINUED | OUTPATIENT
Start: 2017-05-02 | End: 2017-05-03

## 2017-05-02 RX ADMIN — ONDANSETRON 4 MILLIGRAM(S): 8 TABLET, FILM COATED ORAL at 18:02

## 2017-05-02 RX ADMIN — Medication 100 MILLIGRAM(S): at 15:52

## 2017-05-02 RX ADMIN — Medication 100 MILLIGRAM(S): at 21:32

## 2017-05-02 RX ADMIN — CEFEPIME 100 MILLIGRAM(S): 1 INJECTION, POWDER, FOR SOLUTION INTRAMUSCULAR; INTRAVENOUS at 06:32

## 2017-05-02 RX ADMIN — MORPHINE SULFATE 2 MILLIGRAM(S): 50 CAPSULE, EXTENDED RELEASE ORAL at 12:10

## 2017-05-02 RX ADMIN — Medication 40 MILLIGRAM(S): at 18:02

## 2017-05-02 RX ADMIN — AZITHROMYCIN 255 MILLIGRAM(S): 500 TABLET, FILM COATED ORAL at 10:58

## 2017-05-02 RX ADMIN — Medication 0.5 MILLIGRAM(S): at 09:49

## 2017-05-02 RX ADMIN — Medication 40 MILLIGRAM(S): at 06:33

## 2017-05-02 RX ADMIN — SODIUM CHLORIDE 3 MILLILITER(S): 9 INJECTION INTRAMUSCULAR; INTRAVENOUS; SUBCUTANEOUS at 21:37

## 2017-05-02 RX ADMIN — Medication 3 MILLILITER(S): at 09:46

## 2017-05-02 RX ADMIN — SODIUM CHLORIDE 3 MILLILITER(S): 9 INJECTION INTRAMUSCULAR; INTRAVENOUS; SUBCUTANEOUS at 06:22

## 2017-05-02 RX ADMIN — Medication 3 MILLILITER(S): at 19:10

## 2017-05-02 RX ADMIN — Medication 0.5 MILLIGRAM(S): at 19:11

## 2017-05-02 RX ADMIN — ZOLPIDEM TARTRATE 5 MILLIGRAM(S): 10 TABLET ORAL at 21:33

## 2017-05-02 RX ADMIN — Medication 1 TABLET(S): at 15:51

## 2017-05-02 RX ADMIN — CEFEPIME 100 MILLIGRAM(S): 1 INJECTION, POWDER, FOR SOLUTION INTRAMUSCULAR; INTRAVENOUS at 18:01

## 2017-05-02 RX ADMIN — HEPARIN SODIUM 5000 UNIT(S): 5000 INJECTION INTRAVENOUS; SUBCUTANEOUS at 15:51

## 2017-05-02 RX ADMIN — Medication 3 MILLILITER(S): at 14:23

## 2017-05-02 RX ADMIN — Medication 1 TABLET(S): at 21:32

## 2017-05-02 RX ADMIN — Medication 100 MILLIGRAM(S): at 06:33

## 2017-05-02 RX ADMIN — SODIUM CHLORIDE 3 MILLILITER(S): 9 INJECTION INTRAMUSCULAR; INTRAVENOUS; SUBCUTANEOUS at 15:52

## 2017-05-02 NOTE — PROVIDER CONTACT NOTE (OTHER) - NAME OF MD/NP/PA/DO NOTIFIED:
Dr. Bañuelos - Infectious Disease
Dr Ayala
Dr. Coombs [facility]
Dr. Torres

## 2017-05-02 NOTE — PROGRESS NOTE ADULT - SUBJECTIVE AND OBJECTIVE BOX
Subjective:    pat much better, less sob, no cough, no phlegm.    MEDICATIONS  (STANDING):  heparin  Injectable 5000Unit(s) SubCutaneous every 8 hours  sodium chloride 0.9% lock flush 3milliLiter(s) IV Push every 8 hours  docusate sodium 100milliGRAM(s) Oral three times a day  cefepime  IVPB 1000milliGRAM(s) IV Intermittent every 12 hours  metoprolol 25milliGRAM(s) Oral two times a day  azithromycin  IVPB  IV Intermittent   azithromycin  IVPB 500milliGRAM(s) IV Intermittent every 24 hours  sodium chloride 0.9%. 1000milliLiter(s) IV Continuous <Continuous>  methylPREDNISolone sodium succinate Injectable 40milliGRAM(s) IV Push two times a day  ALBUTerol/ipratropium for Nebulization 3milliLiter(s) Nebulizer every 6 hours  buDESOnide   0.5 milliGRAM(s) Respule 0.5milliGRAM(s) Inhalation two times a day    MEDICATIONS  (PRN):  calcium carbonate 500 mG (Tums) Chewable 3Tablet(s) Chew every 6 hours PRN Dyspepsia  zolpidem 5milliGRAM(s) Oral at bedtime PRN Insomnia  acetaminophen   Tablet 650milliGRAM(s) Oral every 6 hours PRN For Temp greater than 38 C (100.4 F)  LORazepam     Tablet 0.5milliGRAM(s) Oral three times a day PRN Anxiety  morphine  - Injectable 2milliGRAM(s) IV Push every 6 hours PRN Severe Pain (7 - 10)      Allergies    No Known Allergies    Intolerances        Vital Signs Last 24 Hrs  T(C): 37.1, Max: 37.1 (05-02 @ 05:46)  T(F): 98.8, Max: 98.8 (05-02 @ 05:46)  HR: 95 (78 - 114)  BP: 98/66 (87/50 - 98/66)  BP(mean): --  RR: 20 (20 - 22)  SpO2: 95% (91% - 96%)    PHYSICAL EXAMINATION:    NECK:  Supple. No lymphadenopathy. Jugular venous pressure not elevated. Carotids equal.   HEART:   The cardiac impulse has a normal quality. Reg., Nl S1 and S2.  There are no murmurs, rubs or gallops noted  CHEST:  Chest few crackles & rhonchi to auscultation. Normal respiratory effort.  ABDOMEN:  Soft and nontender.   EXTREMITIES:  There is no edema.       LABS:                        10.0   15.6  )-----------( 71       ( 02 May 2017 05:37 )             29.6     05-02    131<L>  |  96  |  16  ----------------------------<  114<H>  3.6   |  19<L>  |  0.41<L>    Ca    5.7<LL>      02 May 2017 05:37

## 2017-05-02 NOTE — PROGRESS NOTE ADULT - ASSESSMENT
PROBLEMS:  SOB-secodnary to Pneumonia/scattered nodular and groundglass opacities in both lungs unchanged   from the prior study, likely infectious or inflammatory/lymphangitic spread  COPD/EMPHYSEMA Flare  Metastatic SCC CA to bone s/p spine fixation for pathologic fx   Recent SBO  Leg swelling    PLAN;    IV ABX-azithromycin -cefepime  IV solumedrol 40mg q12hr  AEROSOL  SUPPORTIVE CARE  PULSE OXIMETRY  dvt PROPHYLASIX

## 2017-05-02 NOTE — PROGRESS NOTE ADULT - SUBJECTIVE AND OBJECTIVE BOX
Pt is a 57 y/o/f with past medical history of pmhx of COPD (ex smoker quit 2016 1ppd), metastatic cervical CA (treated w cisplatin and taxol last on  and RT to pelvis) to bone (s/p emergency fixation for pathologic fx ), recent enlarged lobe of liver (presented at conference ; follicular hyperplasia w dysfunction), hx of rt pelvis radiation,  s/p recent SBO discharged from  04-27 presented to  ED complaining of new onset SOB and pedal edema    5/2: very deconditioned, frail, sick appearing. Extensive medical history.     Physical Exam:   GENERAL APPEARANCE:  NAD, hemodynamically stable  T(C): 37.1, Max: 37.1 (05-02 @ 05:46)  HR: 95 (78 - 114)  BP: 98/66 (89/54 - 98/66)  RR: 20 (20 - 22)  SpO2: 95% (91% - 96%)  Wt(kg): --  HEENT:  Head is normocephalic    Skin:  Warm and dry without any rash   NECK:  Supple without lymphadenopathy.   HEART:  Regular rate and rhythm. normal S1 and S2, No M/R/G  LUNGS:  Good ins/exp effort, no W/R/R/C  ABDOMEN:  Soft, nontender, nondistended with good bowel sounds heard  EXTREMITIES:  Without cyanosis, clubbing or edema.   NEUROLOGICAL:  Gross nonfocal       CBC Full  -  ( 02 May 2017 05:37 )  WBC Count : 15.6 K/uL  Hemoglobin : 10.0 g/dL  Hematocrit : 29.6 %  Platelet Count - Automated : 71 K/uL      131<L>  |  96  |  16  ----------------------------<  114<H>  3.6   |  19<L>  |  0.41<L>    Ca    5.7<LL>        # Acute hypoxic respiratory failure secondary to Pneumonia/scattered nodular and ground glass - opacities in both lungs unchanged   from the prior study, likely infectious or inflammatory/lymphangitic spread  - WBC - 18.6 - >15.6  - CTA - no PE  - Cefepime / Zithromax IV day #4 - ID following  - Lactic acid acidosis marija seconary to underlying malignancy vs metabolic acidosis   - Lac 5.2 on admit > 4.4 > 4.3 ; the lactic acid is likley secondary to the undelrying malignancy.  She is not acidotic.   - start solumederol 40 iv q12h    # Pedal edema- chf vs hypoalbuminemia   - echo pending - prelim discussion with Dr. Torres - with elevated pulmonary pressures.   - c/w optimial nutrition as patient tolerates  - nutritional consult    # hyponatremia- possibly hypovolemic hyponatremia  second poor oral intake  - Na 131  - likely chronic in nature    # hypokalemia-supplemented  - repeat bmp in am    # thrombocytopenia  - likely secondary to malignancy and bm suppression  - 97->71 trending down    #  met SCC to bone s/p spine fixation for pathologic fx  - pain management  - percocet / morphine

## 2017-05-02 NOTE — PROVIDER CONTACT NOTE (OTHER) - SITUATION
Spoke with Amaya
Message left with answering service - aware of consult
pt complaints of SOB, elevated .
spoke with Kasandra in answering service regarding consult

## 2017-05-02 NOTE — PROGRESS NOTE ADULT - SUBJECTIVE AND OBJECTIVE BOX
HPI: 56 yr old female w  metastatic cervical  CA (treated w cisplatin and taxol and RT to pelvis) mets to bone (s/p emergency fixation for pathologic fx ), recent enlarged lobe of liver. S/P recent SBO discharged from  04-27 presented to  ED complaining of sudden onset SOB that has been progressive. Increasing pedal edema over the last few weeks. Had temp to 101 here and was unaware of fever.     5/1/17 - Dyspnea. Being transferred to tele.    5/2/17 - Feels better with treatment. No new events. HR is better. Less dyspnea    PAST MEDICAL & SURGICAL HISTORY:  Omphalocele: hx of multiple abdominal surgeries  SBO (small bowel obstruction): 04-24 - 04-  Cervical ca  Spinal surgery in prior 3 months: August 2016  Encounter for central line placement: has Tastemade L ant chest  H/O hernia repair  Personal history of spine surgery: August 2016      PREVIOUS CARDIAC WORKUP:   Echo: LV normal size, contraction w LVEF 60-65%, tr MR, tr TR       CURRENT CARDIAC WORKUP:       Echo: 5/1/17   The left ventricle is normal in size. Function is good. Endocardium not always well seen.   Estimated left ventricular ejection fraction is 60-65 % assuming no regional wall motion abnormality.   The left atrium is mildly dilated.   The right atrium appears mildly dilated.   Right ventricle is enlarged in apical views with decreased function.   Normal aortic valve structure and function.   Trace mitral regurgitation is present.   Mild (1+) tricuspid valve regurgitation is present. Severe pulmonary hypertension.   Trace -1+ pulmonic valvular regurgitation is present.   Possible trace pericardial effusion not well defined.   No pleural comment made.   IVC not well seen      Allergies:   No Known Allergies      MEDICATIONS  (STANDING):  heparin  Injectable 5000Unit(s) SubCutaneous every 8 hours  sodium chloride 0.9% lock flush 3milliLiter(s) IV Push every 8 hours  docusate sodium 100milliGRAM(s) Oral three times a day  cefepime  IVPB 1000milliGRAM(s) IV Intermittent every 12 hours  metoprolol 25milliGRAM(s) Oral two times a day  azithromycin  IVPB  IV Intermittent   azithromycin  IVPB 500milliGRAM(s) IV Intermittent every 24 hours  sodium chloride 0.9%. 1000milliLiter(s) IV Continuous <Continuous>  methylPREDNISolone sodium succinate Injectable 40milliGRAM(s) IV Push two times a day  ALBUTerol/ipratropium for Nebulization 3milliLiter(s) Nebulizer every 6 hours  buDESOnide   0.5 milliGRAM(s) Respule 0.5milliGRAM(s) Inhalation two times a day    MEDICATIONS  (PRN):  calcium carbonate 500 mG (Tums) Chewable 3Tablet(s) Chew every 6 hours PRN Dyspepsia  zolpidem 5milliGRAM(s) Oral at bedtime PRN Insomnia  acetaminophen   Tablet 650milliGRAM(s) Oral every 6 hours PRN For Temp greater than 38 C (100.4 F)  LORazepam     Tablet 0.5milliGRAM(s) Oral three times a day PRN Anxiety  morphine  - Injectable 2milliGRAM(s) IV Push every 6 hours PRN Severe Pain (7 - 10)      ROS:     Detailed ten system ROS was performed and was negative except for history as eluded to above.    + fever  no chills  no nausea  no vomiting  no diarrhea  no constipation  no melena  no hematochezia  no chest pain  no palpitations  + sob  + dyspnea  no cough  no wheezing  no anorexia  no headache  no dizziness  no syncope  no weakness  no myalgia  no dysuria  no polyuria  no hematuria   + edema      Vital Signs Last 24 Hrs    T(C): 37.1, Max: 37.1 (05-02 @ 05:46)  T(F): 98.8, Max: 98.8 (05-02 @ 05:46)  HR: 95 (78 - 114)  BP: 98/66 (87/50 - 98/66)  BP(mean): --  RR: 20 (20 - 22)  SpO2: 95% (91% - 96%)    PHYSICAL EXAM:    General Appearance:    Comfortable, AAO X 3, mild resp distress. Palor  HEENT:                       Atraumatic, PERRLA, EOMI, conjunctiva clear.   Neck:                          Supple, no adenopathy, no thyromegaly, no JVD, no carotid bruit  Chest:                         b/l rhonchi, tachypnea.   Heart:                          S1, S2 normal, ectopy notedno gallop, no murmur.  Abdomen:                    Soft, non-tender, bowel sounds active. No palpable masses.   Extremities:                 no cyanosis, 2+ edema, no joint swelling. Peripheral pulses normal  Skin:                           Skin color, texture normal. No rashes   Neurologic:                  Alert and oriented X3, cranial nerves intact, sensory and motor normal.    INTERPRETATION OF TELEMETRY:  NSR, Rate better . Occasional PACs and short PAT    ECG:  NSR, PACs    LABS:                        10.0   15.6  )-----------( 71       ( 02 May 2017 05:37 )             29.6     05-02    131<L>  |  96  |  16  ----------------------------<  114<H>  3.6   |  19<L>  |  0.41<L>    Ca    5.7<LL>      02 May 2017 05:37        04-29 @ 14:15  Trop-I <0.015  CK     --  CK MB  --    04-29 @ 12:35  Trop-I <0.015  CK     --  CK MB  --    Pro BNP  2583 04-30 @ 14:14  Pro BNP  1966 04-29 @ 12:35      RADIOLOGY & ADDITIONAL STUDIES:  None recent.

## 2017-05-02 NOTE — CONSULT NOTE ADULT - SUBJECTIVE AND OBJECTIVE BOX
Patient is a 56y Female whom presented to the hospital with COPD (ex smoker quit 2016 1ppd), metastatic cervical CA (treated w cisplatin and taxol last on  and RT to pelvis) to bone (s/p emergency fixation for pathologic fx ), s/p recent SBO discharged from  04-27 presented to  ED complaining of new onset SOB and pedal edema. Patient being treated as a PNA and now renal evaluation of hypocalcemia. Per old notes from onc, patient she does report being treated with Xgeva q month via injection. Not currently taking ca++ supplements. Intake fair. Breathing stable but limited during admit with therapy.    PAST MEDICAL & SURGICAL HISTORY:  Omphalocele: hx of multiple abdominal surgeries  SBO (small bowel obstruction): 04-24 - 04-  Cervical ca  Spinal surgery in prior 3 months: August 2016  Encounter for central line placement: has mediport L ant chest  H/O hernia repair  Personal history of spine surgery: August 2016      MEDICATIONS  (STANDING):  heparin  Injectable 5000Unit(s) SubCutaneous every 8 hours  sodium chloride 0.9% lock flush 3milliLiter(s) IV Push every 8 hours  docusate sodium 100milliGRAM(s) Oral three times a day  cefepime  IVPB 1000milliGRAM(s) IV Intermittent every 12 hours  metoprolol 25milliGRAM(s) Oral two times a day  azithromycin  IVPB  IV Intermittent   azithromycin  IVPB 500milliGRAM(s) IV Intermittent every 24 hours  sodium chloride 0.9%. 1000milliLiter(s) IV Continuous <Continuous>  methylPREDNISolone sodium succinate Injectable 40milliGRAM(s) IV Push two times a day  ALBUTerol/ipratropium for Nebulization 3milliLiter(s) Nebulizer every 6 hours  buDESOnide   0.5 milliGRAM(s) Respule 0.5milliGRAM(s) Inhalation two times a day    MEDICATIONS  (PRN):  calcium carbonate 500 mG (Tums) Chewable 3Tablet(s) Chew every 6 hours PRN Dyspepsia  zolpidem 5milliGRAM(s) Oral at bedtime PRN Insomnia  acetaminophen   Tablet 650milliGRAM(s) Oral every 6 hours PRN For Temp greater than 38 C (100.4 F)  LORazepam     Tablet 0.5milliGRAM(s) Oral three times a day PRN Anxiety  morphine  - Injectable 2milliGRAM(s) IV Push every 6 hours PRN Severe Pain (7 - 10)      Allergies    No Known Allergies    Intolerances        SOCIAL HISTORY:  Past cigg as above    FAMILY HISTORY:  No pertinent family history in first degree relatives renal disease      REVIEW OF SYSTEMS:    CONSTITUTIONAL: stable weaknss,  no fevers or chills  EYES/ENT: No visual changes;  No vertigo or throat pain   NECK: No pain or stiffness  RESPIRATORY: No cough, wheezing, hemoptysis; No shortness of breath  CARDIOVASCULAR: No chest pain or palpitations  GASTROINTESTINAL: No abdominal or epigastric pain. No nausea, vomiting, or hematemesis; No diarrhea or constipation. No melena or hematochezia.  GENITOURINARY: No dysuria, frequency or hematuria  NEUROLOGICAL: No numbness or weakness  SKIN: No itching, burning, rashes, or lesions   All other review of systems is negative unless indicated above.      T(C): , Max: 37.2 (05-02 @ 11:32)  T(F): , Max: 98.9 (05-02 @ 11:32)  HR: 107  BP: 90/59  BP(mean): --  RR: 20  SpO2: 94%  Wt(kg): --    I & Os for current day (as of 05-02 @ 13:31)  =============================================  IN: 0 ml / OUT: 950 ml / NET: -950 ml        PHYSICAL EXAM:    Constitutional: NAD, well-groomed, well-developed  HEENT: PERRLA, EOMI,  MMM  Neck: No LAD, No JVD  Respiratory: CTAB  Cardiovascular: S1 and S2, RRR  Gastrointestinal: BS+, soft, NT/ND  Extremities: No peripheral edema  Neurological: A/O x 3, no focal deficits  Psychiatric: Normal mood, normal affect  : No Dickinson  Skin: No rashes  Access: Not applicable        LABS:                        10.0   15.6  )-----------( 71       ( 02 May 2017 05:37 )             29.6     02 May 2017 05:37    131    |  96     |  16     ----------------------------<  114    3.6     |  19     |  0.41   01 May 2017 06:09    130    |  95     |  15     ----------------------------<  78     3.2     |  19     |  0.51   30 Apr 2017 06:16    130    |  97     |  12     ----------------------------<  79     3.3     |  20     |  0.48   29 Apr 2017 12:35    132    |  95     |  16     ----------------------------<  113    3.7     |  21     |  0.66     Ca    5.7        02 May 2017 05:37  Ca    6.2        01 May 2017 06:09  Ca    6.1        30 Apr 2017 06:16  Ca    6.6        29 Apr 2017 12:35    TPro  5.7    /  Alb  2.1    /  TBili  3.2    /  DBili  x      /  AST  247    /  ALT  97     /  AlkPhos  476    29 Apr 2017 12:35          Urine Studies:          RADIOLOGY & ADDITIONAL STUDIES:

## 2017-05-02 NOTE — CONSULT NOTE ADULT - ASSESSMENT
56y Female who presented to the hospital with COPD (ex smoker quit 2016 1ppd), metastatic cervical CA s/p recent SBO new onset SOB and pedal edema setting of PNA, renal evaluation of hypocalcemia. Hypocalcemia I suspect is secondary to Xgeva therapy.     Hypocalcemia  -Will check vitamin D levels, PTH, Ionized ca++ values  -Start oscal + D bid  -Would stop IVF, likely further exacerbating depressed serum ca++. Corrected ca++ likely closer to 7.3 mg/dl (albumin 2.4)  -Currently with no signs or acute sxs of hypocalcemia, does not require IV ca++ or active D, can consider if cardiac concerns    Hyponatremia  -Urine studies/lytes/osmolarity if able to obtain  -Stop IVF  -Optimize oral intake    Volume  -CVS following  -Stop IVF for now with edema  -Quantify proteinuria if able to obtain    thanks, will follow with you    d/c with staff  d/c with medical team

## 2017-05-02 NOTE — PROGRESS NOTE ADULT - ASSESSMENT
Dyspnea  Possible PNA  COPD  Severe PAH on Echo. Large Main pulm artery on CTA chest. Probably has had primary PAH and wore with current pulm compromise.  Hypoxemia  Hypokalemia  PAC / Ectopy noted on tele may be related to hypoxemia  Paroxysmal atrial tachycardia  Coronary calcification seen on CT chest  No ACS; doubt CHF clinically  Hypoalbuminemia may be contributing to tissue and pulm edema  Metastatic Cervical CA    Suggest:    O2 supplement  ABx  Pulm stabilization. Bronchodilators. Steroids  Beta blockers  Overall prognosis is guarded.  Conservative cardiac care is suggested  K supplement.  D/w family - sister and father

## 2017-05-03 VITALS
HEART RATE: 70 BPM | RESPIRATION RATE: 28 BRPM | DIASTOLIC BLOOD PRESSURE: 56 MMHG | OXYGEN SATURATION: 61 % | SYSTOLIC BLOOD PRESSURE: 102 MMHG

## 2017-05-03 LAB
24R-OH-CALCIDIOL SERPL-MCNC: 6.7 NG/ML — LOW (ref 30–100)
ADD ON TEST-SPECIMEN IN LAB: SIGNIFICANT CHANGE UP
ANION GAP SERPL CALC-SCNC: 26 MMOL/L — HIGH (ref 5–17)
BASE EXCESS BLDA CALC-SCNC: -19 MMOL/L — LOW (ref -2–2)
BLOOD GAS COMMENTS ARTERIAL: SIGNIFICANT CHANGE UP
BUN SERPL-MCNC: 21 MG/DL — SIGNIFICANT CHANGE UP (ref 7–23)
CA-I BLD-SCNC: 0.82 MMOL/L — LOW (ref 1.05–1.34)
CALCIUM SERPL-MCNC: 6.1 MG/DL — CRITICAL LOW (ref 8.4–10.5)
CALCIUM SERPL-MCNC: 6.4 MG/DL — CRITICAL LOW (ref 8.5–10.1)
CHLORIDE SERPL-SCNC: 96 MMOL/L — SIGNIFICANT CHANGE UP (ref 96–108)
CHLORIDE UR-SCNC: 20 MMOL/L — SIGNIFICANT CHANGE UP
CO2 SERPL-SCNC: 8 MMOL/L — CRITICAL LOW (ref 22–31)
CREAT ?TM UR-MCNC: 70 MG/DL — SIGNIFICANT CHANGE UP
CREAT SERPL-MCNC: 1.07 MG/DL — SIGNIFICANT CHANGE UP (ref 0.5–1.3)
GLUCOSE SERPL-MCNC: 86 MG/DL — SIGNIFICANT CHANGE UP (ref 70–99)
HCO3 BLDA-SCNC: 8 MMOL/L — LOW (ref 21–29)
HOROWITZ INDEX BLDA+IHG-RTO: 100 — SIGNIFICANT CHANGE UP
LACTATE SERPL-SCNC: 14.7 MMOL/L — CRITICAL HIGH (ref 0.7–2)
MAGNESIUM SERPL-MCNC: 1.3 MG/DL — LOW (ref 1.8–2.4)
MAGNESIUM SERPL-MCNC: 2 MG/DL — SIGNIFICANT CHANGE UP (ref 1.8–2.4)
OSMOLALITY UR: 580 MOS/KG — SIGNIFICANT CHANGE UP (ref 50–1200)
PCO2 BLDA: 25 MMHG — LOW (ref 32–46)
PH BLDA: 7.15 — CRITICAL LOW (ref 7.35–7.45)
PHOSPHATE SERPL-MCNC: 0.8 MG/DL — CRITICAL LOW (ref 2.5–4.5)
PHOSPHATE SERPL-MCNC: 6.3 MG/DL — HIGH (ref 2.5–4.5)
PO2 BLDA: 383 — SIGNIFICANT CHANGE UP
POTASSIUM SERPL-MCNC: 5.9 MMOL/L — HIGH (ref 3.5–5.3)
POTASSIUM SERPL-SCNC: 5.9 MMOL/L — HIGH (ref 3.5–5.3)
PROT ?TM UR-MCNC: 84 MG/DL — HIGH (ref 0–12)
PROT/CREAT UR-RTO: 1.2 RATIO — HIGH (ref 0–0.2)
SAO2 % BLDA: 99 — SIGNIFICANT CHANGE UP
SODIUM SERPL-SCNC: 130 MMOL/L — LOW (ref 135–145)
SODIUM UR-SCNC: <20 MMOL/L — SIGNIFICANT CHANGE UP

## 2017-05-03 PROCEDURE — 93010 ELECTROCARDIOGRAM REPORT: CPT

## 2017-05-03 PROCEDURE — 71010: CPT | Mod: 26,76

## 2017-05-03 RX ORDER — PROPOFOL 10 MG/ML
10 INJECTION, EMULSION INTRAVENOUS
Qty: 1000 | Refills: 0 | Status: DISCONTINUED | OUTPATIENT
Start: 2017-05-03 | End: 2017-05-03

## 2017-05-03 RX ORDER — MAGNESIUM SULFATE 500 MG/ML
2 VIAL (ML) INJECTION ONCE
Qty: 0 | Refills: 0 | Status: COMPLETED | OUTPATIENT
Start: 2017-05-03 | End: 2017-05-03

## 2017-05-03 RX ORDER — AMIODARONE HYDROCHLORIDE 400 MG/1
150 TABLET ORAL ONCE
Qty: 0 | Refills: 0 | Status: DISCONTINUED | OUTPATIENT
Start: 2017-05-03 | End: 2017-05-03

## 2017-05-03 RX ORDER — ADENOSINE 3 MG/ML
12 INJECTION INTRAVENOUS ONCE
Qty: 0 | Refills: 0 | Status: COMPLETED | OUTPATIENT
Start: 2017-05-03 | End: 2017-05-03

## 2017-05-03 RX ORDER — HYDROMORPHONE HYDROCHLORIDE 2 MG/ML
1 INJECTION INTRAMUSCULAR; INTRAVENOUS; SUBCUTANEOUS
Qty: 0 | Refills: 0 | Status: DISCONTINUED | OUTPATIENT
Start: 2017-05-03 | End: 2017-05-03

## 2017-05-03 RX ORDER — CHLORHEXIDINE GLUCONATE 213 G/1000ML
5 SOLUTION TOPICAL
Qty: 0 | Refills: 0 | Status: DISCONTINUED | OUTPATIENT
Start: 2017-05-03 | End: 2017-05-03

## 2017-05-03 RX ORDER — PANTOPRAZOLE SODIUM 20 MG/1
40 TABLET, DELAYED RELEASE ORAL DAILY
Qty: 0 | Refills: 0 | Status: DISCONTINUED | OUTPATIENT
Start: 2017-05-03 | End: 2017-05-03

## 2017-05-03 RX ORDER — SODIUM,POTASSIUM PHOSPHATES 278-250MG
2 POWDER IN PACKET (EA) ORAL ONCE
Qty: 0 | Refills: 0 | Status: DISCONTINUED | OUTPATIENT
Start: 2017-05-03 | End: 2017-05-03

## 2017-05-03 RX ORDER — CALCITRIOL 0.5 UG/1
0.25 CAPSULE ORAL
Qty: 0 | Refills: 0 | Status: DISCONTINUED | OUTPATIENT
Start: 2017-05-03 | End: 2017-05-03

## 2017-05-03 RX ORDER — PHENYLEPHRINE HYDROCHLORIDE 10 MG/ML
0.5 INJECTION INTRAVENOUS
Qty: 80 | Refills: 0 | Status: DISCONTINUED | OUTPATIENT
Start: 2017-05-03 | End: 2017-05-03

## 2017-05-03 RX ORDER — ADENOSINE 3 MG/ML
6 INJECTION INTRAVENOUS ONCE
Qty: 0 | Refills: 0 | Status: COMPLETED | OUTPATIENT
Start: 2017-05-03 | End: 2017-05-03

## 2017-05-03 RX ORDER — METOPROLOL TARTRATE 50 MG
2.5 TABLET ORAL EVERY 6 HOURS
Qty: 0 | Refills: 0 | Status: DISCONTINUED | OUTPATIENT
Start: 2017-05-03 | End: 2017-05-03

## 2017-05-03 RX ORDER — MORPHINE SULFATE 50 MG/1
2 CAPSULE, EXTENDED RELEASE ORAL ONCE
Qty: 0 | Refills: 0 | Status: DISCONTINUED | OUTPATIENT
Start: 2017-05-03 | End: 2017-05-03

## 2017-05-03 RX ORDER — ALPRAZOLAM 0.25 MG
0.5 TABLET ORAL ONCE
Qty: 0 | Refills: 0 | Status: DISCONTINUED | OUTPATIENT
Start: 2017-05-03 | End: 2017-05-03

## 2017-05-03 RX ORDER — METOPROLOL TARTRATE 50 MG
5 TABLET ORAL
Qty: 0 | Refills: 0 | Status: COMPLETED | OUTPATIENT
Start: 2017-05-03 | End: 2017-05-03

## 2017-05-03 RX ORDER — SODIUM CHLORIDE 9 MG/ML
500 INJECTION INTRAMUSCULAR; INTRAVENOUS; SUBCUTANEOUS ONCE
Qty: 0 | Refills: 0 | Status: COMPLETED | OUTPATIENT
Start: 2017-05-03 | End: 2017-05-03

## 2017-05-03 RX ORDER — METOPROLOL TARTRATE 50 MG
50 TABLET ORAL
Qty: 0 | Refills: 0 | Status: DISCONTINUED | OUTPATIENT
Start: 2017-05-03 | End: 2017-05-03

## 2017-05-03 RX ADMIN — HEPARIN SODIUM 5000 UNIT(S): 5000 INJECTION INTRAVENOUS; SUBCUTANEOUS at 13:59

## 2017-05-03 RX ADMIN — Medication 5 MILLIGRAM(S): at 11:45

## 2017-05-03 RX ADMIN — CALCITRIOL 0.25 MICROGRAM(S): 0.5 CAPSULE ORAL at 18:21

## 2017-05-03 RX ADMIN — MORPHINE SULFATE 2 MILLIGRAM(S): 50 CAPSULE, EXTENDED RELEASE ORAL at 09:09

## 2017-05-03 RX ADMIN — Medication 0.5 MILLIGRAM(S): at 08:40

## 2017-05-03 RX ADMIN — PHENYLEPHRINE HYDROCHLORIDE 14.96 MICROGRAM(S)/KG/MIN: 10 INJECTION INTRAVENOUS at 12:22

## 2017-05-03 RX ADMIN — Medication 50 GRAM(S): at 09:28

## 2017-05-03 RX ADMIN — Medication 40 MILLIGRAM(S): at 18:21

## 2017-05-03 RX ADMIN — PROPOFOL 4.79 MICROGRAM(S)/KG/MIN: 10 INJECTION, EMULSION INTRAVENOUS at 13:37

## 2017-05-03 RX ADMIN — Medication 40 MILLIGRAM(S): at 06:08

## 2017-05-03 RX ADMIN — ADENOSINE 12 MILLIGRAM(S): 3 INJECTION INTRAVENOUS at 11:48

## 2017-05-03 RX ADMIN — Medication 3 MILLILITER(S): at 08:40

## 2017-05-03 RX ADMIN — PANTOPRAZOLE SODIUM 40 MILLIGRAM(S): 20 TABLET, DELAYED RELEASE ORAL at 18:21

## 2017-05-03 RX ADMIN — Medication 3 MILLILITER(S): at 13:05

## 2017-05-03 RX ADMIN — Medication 1 TABLET(S): at 06:08

## 2017-05-03 RX ADMIN — Medication 63.75 MILLIMOLE(S): at 12:13

## 2017-05-03 RX ADMIN — MORPHINE SULFATE 2 MILLIGRAM(S): 50 CAPSULE, EXTENDED RELEASE ORAL at 12:53

## 2017-05-03 RX ADMIN — ADENOSINE 6 MILLIGRAM(S): 3 INJECTION INTRAVENOUS at 11:14

## 2017-05-03 RX ADMIN — Medication 5 MILLIGRAM(S): at 11:40

## 2017-05-03 RX ADMIN — SODIUM CHLORIDE 2000 MILLILITER(S): 9 INJECTION INTRAMUSCULAR; INTRAVENOUS; SUBCUTANEOUS at 11:51

## 2017-05-03 RX ADMIN — SODIUM CHLORIDE 3 MILLILITER(S): 9 INJECTION INTRAMUSCULAR; INTRAVENOUS; SUBCUTANEOUS at 06:06

## 2017-05-03 RX ADMIN — CEFEPIME 100 MILLIGRAM(S): 1 INJECTION, POWDER, FOR SOLUTION INTRAMUSCULAR; INTRAVENOUS at 06:08

## 2017-05-03 RX ADMIN — CEFEPIME 100 MILLIGRAM(S): 1 INJECTION, POWDER, FOR SOLUTION INTRAMUSCULAR; INTRAVENOUS at 18:22

## 2017-05-03 RX ADMIN — ADENOSINE 12 MILLIGRAM(S): 3 INJECTION INTRAVENOUS at 11:14

## 2017-05-03 RX ADMIN — PROPOFOL 4.79 MICROGRAM(S)/KG/MIN: 10 INJECTION, EMULSION INTRAVENOUS at 19:50

## 2017-05-03 RX ADMIN — SODIUM CHLORIDE 3 MILLILITER(S): 9 INJECTION INTRAMUSCULAR; INTRAVENOUS; SUBCUTANEOUS at 14:00

## 2017-05-03 RX ADMIN — CHLORHEXIDINE GLUCONATE 5 MILLILITER(S): 213 SOLUTION TOPICAL at 18:21

## 2017-05-03 RX ADMIN — Medication 100 MILLIGRAM(S): at 06:08

## 2017-05-03 RX ADMIN — AZITHROMYCIN 255 MILLIGRAM(S): 500 TABLET, FILM COATED ORAL at 12:12

## 2017-05-03 RX ADMIN — ONDANSETRON 4 MILLIGRAM(S): 8 TABLET, FILM COATED ORAL at 12:25

## 2017-05-03 RX ADMIN — MORPHINE SULFATE 2 MILLIGRAM(S): 50 CAPSULE, EXTENDED RELEASE ORAL at 03:11

## 2017-05-03 NOTE — PROGRESS NOTE ADULT - SUBJECTIVE AND OBJECTIVE BOX
56 yr old female w  metastatic cerv CA(treated w cisplatin and taxol and RT to pelvis) to bone (s/p emergency fixation for pathologic fx ), recent enlarged lobe of liver (presented at conference ; follicular hyperplasia w dysfunction),  S/P recent SBO discharged from   admitted  with worsening sob since Friday evening along with pedal edema, denies cough, denies chills but here febrile to 101, elevated wbc ct to 16.1, CT with scattered groundglass opacities concerning for infection vs inflammation vs drug toxicity, pt was given IV vancomycin/cefepime d/t concern of infection.        RRT 5/3 am, HR noted to be in 180s-200s with SVT  transferred to CCU for further monitoring  intubated d/t resp distress/failure and started on pressor support  s/p tx for SVT with amio/adenosine/beta-blockade now in NSR  no fevers    MEDICATIONS  (STANDING):  heparin  Injectable 5000Unit(s) SubCutaneous every 8 hours  sodium chloride 0.9% lock flush 3milliLiter(s) IV Push every 8 hours  docusate sodium 100milliGRAM(s) Oral three times a day  cefepime  IVPB 1000milliGRAM(s) IV Intermittent every 12 hours  azithromycin  IVPB  IV Intermittent   azithromycin  IVPB 500milliGRAM(s) IV Intermittent every 24 hours  methylPREDNISolone sodium succinate Injectable 40milliGRAM(s) IV Push two times a day  ALBUTerol/ipratropium for Nebulization 3milliLiter(s) Nebulizer every 6 hours  buDESOnide   0.5 milliGRAM(s) Respule 0.5milliGRAM(s) Inhalation two times a day  calcium carbonate 1250 mG + Vitamin D (OsCal 500 + D) 1Tablet(s) Oral three times a day  potassium phosphate / sodium phosphate tablet (K-PHOS NEUTRAL) 2Tablet(s) Oral once  ALPRAZolam 0.5milliGRAM(s) Oral once  amiodarone IVPB 150milliGRAM(s) IV Intermittent once  metoprolol 50milliGRAM(s) Oral two times a day  phenylephrine    Infusion 0.5MICROgram(s)/kG/Min IV Continuous <Continuous>  calcitriol   Capsule 0.25MICROGram(s) Oral two times a day  propofol Infusion 10MICROgram(s)/kG/Min IV Continuous <Continuous>  chlorhexidine 0.12% Liquid 5milliLiter(s) Swish and Spit two times a day  pantoprazole  Injectable 40milliGRAM(s) IV Push daily      Vital Signs Last 24 Hrs  T(C): 36.2, Max: 37.2 ( @ 19:42)  T(F): 97.1, Max: 98.9 ( @ 19:42)  HR: 111 (66 - 114)  BP: 94/53 (85/51 - 111/58)  BP(mean): --  RR: 19 (19 - 19)  SpO2: 95% (92% - 97%)          PE:  Constitutional: frail looking, sedated/intubated  HEENT: NC/AT, EOMI, PERRLA  Neck: supple  Back: no tenderness  Respiratory: decreased breath sounds  Cardiovascular: S1S2 regular, no murmurs  Abdomen: soft, not tender, not distended, positive BS  Genitourinary: deferred  Rectal: deferred  Musculoskeletal: no muscle tenderness, no joint swelling or tenderness  Extremities: +pedal edema  Neurological:  no focal deficits  Skin: no rashes    Labs:                                   10.0   15.6  )-----------( 71       ( 02 May 2017 05:37 )             29.6     05-03    134<L>  |  99  |  17  ----------------------------<  113<H>  3.6   |  20<L>  |  0.51    Ca    5.9<LL>      03 May 2017 05:32  Phos  0.8     -  Mg     1.3     -        Urinalysis Basic - ( 2017 15:10 )    Color: Genie / Appearance: very cloudy / S.020 / pH: x  Gluc: x / Ketone: Trace  / Bili: Moderate / Urobili: 4 mg/dL   Blood: x / Protein: 30 mg/dL / Nitrite: Negative   Leuk Esterase: Trace / RBC: 0-2 /HPF / WBC 0-2   Sq Epi: x / Non Sq Epi: Occasional / Bacteria: Many        Culture - Blood (17 @ 12:48)    Specimen Source: .Blood None    Culture Results:   No growth to date.    Culture - Blood (17 @ 12:35)    Specimen Source: .Blood Blood-Peripheral    Culture Results:   No growth to date.          Radiology:    EXAM:  CHEST SINGLE VIEW FRONTAL                            PROCEDURE DATE:  2017        INTERPRETATION:  Views:1  Comparison: 17  History: Shortness of breath    The lungs are clear of infiltrates and effusions. An implanted port and   spinal fixation hardware are again noted. The cardiac and mediastinal   contours appear unremarkable.     Impression:  1. No evidence of acute pulmonary disease.          EXAM:  CT ANGIO CHEST PE PROTOCOL IC                            PROCEDURE DATE:  2017        INTERPRETATION:  CT Angiography of the chest for the evaluation of   pulmonary emboli           CPT 93704      CLINICAL INFORMATION:  Sudden onset ofdyspnea. Patient has history of   carcinoma        TECHNIQUE: Contiguous axial 1.0 mm sections were obtained through the   chest using single helical acquisition acquired during the rapid bolus   administration of 90 cc of Omnipaque 350. 10 cc were discarded.    This   data set was reconstructed as 5 mm sections through the chest and as   axial and coronal 2 mm sections at higher resolution to evaluate the   pulmonary arteries for thrombus. Maximal intensity projection images were   also obtained.    FINDINGS:   Prior CT of the chest dated 2017 is available for review.    No convincing pulmonary emboli are identified.  This evaluation includes   the main pulmonary artery, its right and left branches, and their   segmental branches. Subsegmental branches are incompletely evaluated but   where seen appear patent.    The lungs are are remarkable for small scattered nodular infiltrates and   groundglass infiltrates in both lungs. His are likely infectious or   inflammatory and are unchanged in comparison to the prior study.  The   trachea and major bronchi appear patent.    No enlarged axillary, hilar or mediastinal lymph nodes are found.   The   heart size is normal.   There is scoliosis rods in the thoracic or lumbar   spine.  There is a small hiatal hernia.  Limited evaluation of the upper abdomen is unremarkable.    IMPRESSION:    1.  No evidence of pulmonary emboli    2.  scattered nodular and groundglass opacities in both lungs unchanged   from the prior study, likely infectious or inflammatory.       EXAM:  US DPLX LWR EXT VEINS COMPL BI                            PROCEDURE DATE:  2017        INTERPRETATION:  US DPLX LWR EXT VEINS COMPL BI    HISTORY:  Shortness of breath, metastatic cervical CA, bilateral leg   swelling with history of metastatic cancer    Comparison: None.    Technique: Using gray scale with and without compression, color, and   spectral Doppler evaluation of the deep veins of the right and left leg,   the study was performed.    There is no sonographic evidence fordeep vein thrombosis in the in the   common femoral, femoral, and popliteal veins of the right and left leg.   The veins are patent compressible with normal venous waveforms and   augmentation. Calf veins are patent to the level of the proximal   posterior tibial vein.    IMPRESSION: Normal lower extremity venous Doppler of the femoral and   popliteal veins of the right and left leg to the level of the proximal   posterior tibial vein.    EXAM:  CT CHEST                            PROCEDURE DATE:  2017        INTERPRETATION:  CT CHEST    HISTORY:  fever and shortness of breath            TECHNIQUE: Noncontrast CT of the chest was performed. Coronal and   sagittal images were reconstructed. This study was performed using   automatic exposure control (radiation dose reduction software) to obtain   a diagnostic image quality scan with patient dose as low as reasonably   achievable.    COMPARISON: Chest x-ray from the same day, chest CT 2016    FINDINGS:    LUNGS, AIRWAYS: The central airways are patent. Interval development of   multiple faint groundglass opacities throughout both lungs. No localized   consolidation. Stable bilateral pulmonary nodules including a 3 mmnodule   in the anterior left upper lobe (series 3 image 29).    PLEURA: No pleural abnormality.    HEART AND VESSELS: Normal heart size. No pericardial effusion. Minimal   coronary artery calcification is noted. Ectatic ascending thoracic aorta.   Left chest wall infusion port is seen with the catheter tip in the SVC.     MEDIASTINUM AND VLADIMIR: No adenopathy.    UPPER ABDOMEN: Limited visualization again shows enlargement of the left   lobe of the liver.    BONES AND SOFT TISSUES: Extensive spinal fusion hardware.    IMPRESSION:     Interval development of multiple small groundglass opacities throughout   both lungs without localized airspace consolidation. Findings may be   infectious, inflammatory, or related to drug toxicity.    Advanced directives addressed: full resuscitation

## 2017-05-03 NOTE — DISCHARGE NOTE FOR THE EXPIRED PATIENT - SECONDARY DIAGNOSIS.
Acute respiratory failure with hypoxia Supraventricular tachycardia Hypotension, unspecified hypotension type

## 2017-05-03 NOTE — PROVIDER CONTACT NOTE (CHANGE IN STATUS NOTIFICATION) - ASSESSMENT
Increased shortness of breath since previous assessment, diaphoretic, tachypnea, using accessory muscles.

## 2017-05-03 NOTE — DISCHARGE NOTE FOR THE EXPIRED PATIENT - HOSPITAL COURSE
PATIENT ADMITTED WITH C/O DIFFICULTY BREATHING. PATIENT WITH HISTORY OF CERVICAL CA WITH METS. PATIENT WAS TRANSFERRED TO CCU TODAY 5/3/2017 AND WAS PLACED ON VENTILATORY. FAMILY-SISTER-HCP SIGNED DNR PAPERWORK. PATIENT  AT 20:21 WITH [id number] AT THE BEDSIDE. EMOTIONAL SUPPORT GIVEN.

## 2017-05-03 NOTE — PROVIDER CONTACT NOTE (CRITICAL VALUE NOTIFICATION) - NAME OF MD/NP/PA/DO NOTIFIED:
Dr. Mclean
Dr Henao
Dr Henao aware called Dr. Kim awaiting call back from Dr. Kim
Dr. Tavares
Dr. Telles
MD Longoria
MD Mclean
notified district nurse, she said that she will call MD covering the pt

## 2017-05-03 NOTE — PROVIDER CONTACT NOTE (CHANGE IN STATUS NOTIFICATION) - SITUATION
Received pt. at 1900 orally intubated, on pressors DNR status maintained family at bedside. At 2021 pt. with no pulse, emotional support given. Post mortem care rendered. Pt.'s eyeglasses and dentures

## 2017-05-03 NOTE — PROCEDURE NOTE - NSTRACHPOSTINTU_RESP_A_CORE
Breath sounds bilateral/Chest excursion noted/Chest X-Ray/Positive end tidal Co2 noted/Breath sounds equal/Appropriate capnography

## 2017-05-03 NOTE — PROGRESS NOTE ADULT - ASSESSMENT
PSVT - corrected with Adenosine  Dyspnea  Possible PNA  COPD  Severe PAH on Echo. Large Main pulm artery on CTA chest. Probably has had primary PAH and wore with current pulm compromise.  Hypoxemia  Hypokalemia  PAC / Ectopy noted on tele may be related to hypoxemia  Paroxysmal atrial tachycardia  Coronary calcification seen on CT chest  No ACS; doubt CHF clinically  Hypoalbuminemia may be contributing to tissue and pulm edema  Metastatic Cervical CA    Suggest:    Increase beta blockers if BP permits. Otherwise treat with Amiodarone or dig.  O2 supplement  ABx  Pulm stabilization. Bronchodilators with caution in view of sympathomimetic activity and tachycarrhythmias. Steroids  Electrolyte replacement.  K supplement.  Continue care in CCU.  Monitor respiratory status.  D/w family - sister.

## 2017-05-03 NOTE — PROVIDER CONTACT NOTE (CHANGE IN STATUS NOTIFICATION) - BACKGROUND
were left behind. Awilda Lewis , pt.s sister made aware. glasses and dentures will remain with the body.

## 2017-05-03 NOTE — PROGRESS NOTE ADULT - SUBJECTIVE AND OBJECTIVE BOX
extensive contact with patient and family over the course of the day    After pt brought to CCU pt HR controlled but pt still dyspneic despite increase FiO2 to 100% and trial of NIPPV.    Discussed with pt/family - required intubation due to work of breathing.    Sedated with diprivan and all issues/plans discussed with pts family.    Unfortunately pt continuing to deteriorate despite vent support and pressors.    ABG 7.15/25/380  -      STAT lactate, BMP, Mg, Phos ordered but this is unlikely to change course of disease process.    Dr Tavares and I sat with pts sister/father and discussed issues and prognosis and plans.    Suggested that pt would NOT benefit from CPR should she further deteriorate.    family in agreement with DNR at this time given rapid deterioration and poor prognosis for survival let alone meaningful recovery    A) severe metabolic acidosis, shock, acute respiratory failure - poor prognosis for survival let alone meaningful recovery    P) DNR in place, diludid for pain, keep sedated, vent support          Critical care time 60 min excluding procedures, teaching and/or routine family updates

## 2017-05-03 NOTE — PROGRESS NOTE ADULT - SUBJECTIVE AND OBJECTIVE BOX
Patient is a 56y Female SVT this AM, Adenosine to ccu. Adensine again ongoing now with improvement    REVIEW OF SYSTEMS:  + anxiety  stable SOB  Tachy/feels better with improved HR    MEDICATIONS  (STANDING):  heparin  Injectable 5000Unit(s) SubCutaneous every 8 hours  sodium chloride 0.9% lock flush 3milliLiter(s) IV Push every 8 hours  docusate sodium 100milliGRAM(s) Oral three times a day  cefepime  IVPB 1000milliGRAM(s) IV Intermittent every 12 hours  metoprolol 25milliGRAM(s) Oral two times a day  azithromycin  IVPB  IV Intermittent   azithromycin  IVPB 500milliGRAM(s) IV Intermittent every 24 hours  methylPREDNISolone sodium succinate Injectable 40milliGRAM(s) IV Push two times a day  ALBUTerol/ipratropium for Nebulization 3milliLiter(s) Nebulizer every 6 hours  buDESOnide   0.5 milliGRAM(s) Respule 0.5milliGRAM(s) Inhalation two times a day  calcium carbonate 1250 mG + Vitamin D (OsCal 500 + D) 1Tablet(s) Oral three times a day  potassium phosphate / sodium phosphate tablet (K-PHOS NEUTRAL) 2Tablet(s) Oral once  ALPRAZolam 0.5milliGRAM(s) Oral once  amiodarone IVPB 150milliGRAM(s) IV Intermittent once  sodium chloride 0.9% Bolus 500milliLiter(s) IV Bolus once  phenylephrine    Infusion 0.5MICROgram(s)/kG/Min IV Continuous <Continuous>  sodium phosphate IVPB 15milliMole(s) IV Intermittent once    MEDICATIONS  (PRN):  calcium carbonate 500 mG (Tums) Chewable 3Tablet(s) Chew every 6 hours PRN Dyspepsia  zolpidem 5milliGRAM(s) Oral at bedtime PRN Insomnia  acetaminophen   Tablet 650milliGRAM(s) Oral every 6 hours PRN For Temp greater than 38 C (100.4 F)  LORazepam     Tablet 0.5milliGRAM(s) Oral three times a day PRN Anxiety  morphine  - Injectable 2milliGRAM(s) IV Push every 6 hours PRN Severe Pain (7 - 10)  ondansetron Injectable 4milliGRAM(s) IV Push every 6 hours PRN Nausea and/or Vomiting        T(C): , Max: 37.2 (05-02 @ 19:42)  T(F): , Max: 98.9 (05-02 @ 19:42)  HR: 111  BP: 94/53  BP(mean): --  RR: 19  SpO2: 95%  Wt(kg): --    I & Os for current day (as of 05-03 @ 11:32)  =============================================  IN: 0 ml / OUT: 535 ml / NET: -535 ml      Weight (kg): 79.8 (05-03 @ 11:10)    PHYSICAL EXAM:    Constitutional: NRB in place  HEENT: PERRLA  Neck: No LAD, No JVD  Respiratory: ronchi b/l  Cardiovascular: S1 and S2, RRR  Gastrointestinal: BS+, soft, NT/ND  Extremities:  peripheral edema  Neurological: A/O x 3, no focal deficits  Psychiatric: Normal mood, normal affect  : No Dickinson  Skin: No rashes          LABS:                        10.0   15.6  )-----------( 71       ( 02 May 2017 05:37 )             29.6     02 May 2017 05:37    131    |  96     |  16     ----------------------------<  114    3.6     |  19     |  0.41   01 May 2017 06:09    130    |  95     |  15     ----------------------------<  78     3.2     |  19     |  0.51   30 Apr 2017 06:16    130    |  97     |  12     ----------------------------<  79     3.3     |  20     |  0.48   29 Apr 2017 12:35    132    |  95     |  16     ----------------------------<  113    3.7     |  21     |  0.66     Ca    5.7        02 May 2017 05:37  Ca    6.2        01 May 2017 06:09  Ca    6.1        30 Apr 2017 06:16  Ca    6.6        29 Apr 2017 12:35  Phos  0.8       03 May 2017 05:32  Mg     1.3       03 May 2017 05:32    TPro  5.7    /  Alb  2.1    /  TBili  3.2    /  DBili  x      /  AST  247    /  ALT  97     /  AlkPhos  476    29 Apr 2017 12:35          Urine Studies:    Protein/Creatinine Ratio Calculation: 1.2 Ratio (05-03 @ 09:45)  Creatinine, Random Urine: 70 mg/dL (05-03 @ 09:45)        RADIOLOGY & ADDITIONAL STUDIES:

## 2017-05-03 NOTE — PROGRESS NOTE ADULT - ASSESSMENT
56 yr old female w  metastatic cerv CA(treated w cisplatin and taxol and RT to pelvis) to bone (s/p emergency fixation for pathologic fx ), recent enlarged lobe of liver (presented at conference ; follicular hyperplasia w dysfunction),  S/P recent SBO discharged from  04-27 admitted 4/29 with worsening sob since Friday evening along with pedal edema, denies cough, denies chills but here febrile to 101, elevated wbc ct to 16.1, CT with scattered groundglass opacities concerning for infection vs inflammation vs drug toxicity, pt was given IV vancomycin/cefepime d/t concern of infection.    1. septic shock/hypoxic resp failure/SVT/pna/copd/emphysema/immunocompromised host    - course c/b SVT s/p adenosine/amio/beta blockers back in NSR/resp failure s/p mech ventilation, on pressor support    - CT with scattered groundglass opacities + fever + wbc ct ? probable pna and chf findings d/t hepatic congestion from enlarged liver vs lymphangitic spread     - no recent chemo to suggest drug toxicity as recently discharged from hospital    - PE ruled out by CT angio and LE dopplers (-)    - continue with IV cefepime [id number] for gram (-) resistant coverage day#5    - continue with IV azithromycin 500mg q24h, day#3/3    - blood cx no growth    - on IV steroids per pulm    -supportive care    - monitor temps    -tolerating abx well so far; no side effects noted    -reason for abx use and side effects reviewed with patient    - f/u CBC

## 2017-05-03 NOTE — PROVIDER CONTACT NOTE (CHANGE IN STATUS NOTIFICATION) - SITUATION
Patient with increased SOB from previous assessment. States, "I cant breathe" Dr. Tavares on unit. Called into room to assess patient.

## 2017-05-03 NOTE — PROGRESS NOTE ADULT - ASSESSMENT
A) 55yo F suffering from metastatic cervical cancer who is now in acute hypoxic respiratory failure due to tachyarrhythmia/SVT  transferred to CCU and treated aggressively to control HR as this is likely source of the majority of her respiratory distress  hypotension likely as consequence of rate control agents    plan at this time is for continued B-blocker administration  phenylephrine to support BP in the short term  supplement calcium/phosphorus  O2 as needed  repeat lytes at 1800  GI and DVT prophylaxis as appropriate  morphine PRN dyspnea  supportive care    case discussed in detail with pts sister/aunt/father and all questions answered. A) 57yo F suffering from metastatic cervical cancer who is now in acute hypoxic respiratory failure due to tachyarrhythmia/SVT  transferred to CCU and treated aggressively to control HR as this is likely source of the majority of her respiratory distress  hypotension likely as consequence of rate control agents    plan at this time is for continued B-blocker administration  phenylephrine to support BP in the short term  supplement calcium/phosphorus  O2 as needed  repeat lytes at 1800  GI and DVT prophylaxis as appropriate  morphine PRN dyspnea  calcitriol 0.25mg PO BID  cardiology, hospitalist, and nephrology input appreciated  supportive care    case discussed in detail with pts sister/aunt/father and all questions answered.

## 2017-05-03 NOTE — PROVIDER CONTACT NOTE (CRITICAL VALUE NOTIFICATION) - PERSON GIVING RESULT:
Duc Estrada
[facility]
core lab at [facility]
lab
lab [facility]ime [facility]
owen Sanches
petritray
Lesli

## 2017-05-03 NOTE — PROGRESS NOTE ADULT - PROVIDER SPECIALTY LIST ADULT
Cardiology
Critical Care
Critical Care
Family Medicine
Hospitalist
Hospitalist
Infectious Disease
Infectious Disease
Nephrology
Pulmonology
Pulmonology
Hospitalist
Hospitalist

## 2017-05-03 NOTE — PROGRESS NOTE ADULT - SUBJECTIVE AND OBJECTIVE BOX
Pt is a 57 y/o/f with past medical history of pmhx of COPD (ex smoker quit 2016 1ppd), metastatic cervical CA (treated w cisplatin and taxol last on  and RT to pelvis) to bone (s/p emergency fixation for pathologic fx ), recent enlarged lobe of liver (presented at conference ; follicular hyperplasia w dysfunction), hx of rt pelvis radiation,  s/p recent SBO discharged from  04-27 presented to  ED complaining of new onset SOB and pedal edema    5/3: morphine 2 mg IV x1, EKG - SVT, did not respond to increased oxygen. recommended transfer to CCU. Required IV amiodarone, IV adonesine 6mh x1 (no response), 12 mg x1 convered to sinus tachycardia. patient extremely sick, deocnditioned, respiratory distress. Given advanced dz - poor prognosis.     Physical Exam:   GENERAL APPEARANCE:  hemodynamically unstable with SVT 200s, deconditioned, sick  HEENT:  Head is normocephalic    Skin:  Warm and dry without any rash   NECK:  Supple without lymphadenopathy.   HEART:  tachycardia  LUNGS:  respiratory distress  ABDOMEN:  Soft, nontender, nondistended with good bowel sounds heard  EXTREMITIES:  Without cyanosis, clubbing or edema.   NEUROLOGICAL:  Gross nonfocal       CBC Full  -  ( 02 May 2017 05:37 )  WBC Count : 15.6 K/uL  Hemoglobin : 10.0 g/dL  Hematocrit : 29.6 %  Platelet Count - Automated : 71 K/uL      131<L>  |  96  |  16  ----------------------------<  114<H>  3.6   |  19<L>  |  0.41<L>    Ca    5.7<LL>      02 May 2017 05:37  Phos  0.8     05-03  Mg     1.3     05-03        # Acute respiratory failure in a setting of SVT  - CC monitor  - IV amiodarone / IV adonesine 6mg, IV adenosine 12 mg x1  - CTA - no PE  - Cefepime / Zithromax IV day # 5   - Lactic acid acidosis likley seconary to underlying malignancy vs metabolic acidosis   - start solumederol 40 iv q12h    # Pedal edema-   hypoalbuminemia (malnutrition)  - echo pending - prelim discussion with Dr. Torres - with elevated pulmonary pressures.   - c/w optimial nutrition as patient tolerates  - nutritional consult    # hyponatremia- possibly hypovolemic hyponatremia  second poor oral intake  - Na 131  - likely chronic in nature    # hypokalemia-supplemented  - repeat bmp in am    # thrombocytopenia  - likely secondary to malignancy and bm suppression  - 97->71 trending down    #  met SCC to bone s/p spine fixation for pathologic fx  - pain management  - percocet / morphine

## 2017-05-03 NOTE — PROGRESS NOTE ADULT - ASSESSMENT
56y Female who presented to the hospital with COPD (ex smoker quit 2016 1ppd), metastatic cervical CA s/p recent SBO new onset SOB and pedal edema setting of PNA, renal evaluation of hypocalcemia. Hypocalcemia and multiple electrolytes abnormalities I suspect is secondary to Xgeva therapy.     Hypocalcemia  -Follow up vitamin D levels, PTH, Ionized ca++ values  -Continue oscal + D bid if can take po  -Maintain off IVF unless hypotensive  -AM ca++ values pending  -Replete with IV ca++ to improve serum values with SVT. Can give oral Vitamin D as well if not responding to IV ca therapy, calcitriol .25 mg po bid    Hypophos  -Agressive IV repletion of K phos/Na phos    Hypomagnesemia  -IV mg given, will likely need repeat dose  -Low mg+ contributes to low Ca++ as well    Volume  -CVS following  -Keep off IVF as above    d/c with staff  d/c with medical team  d/c with CVS  d/c with ICU team

## 2017-05-03 NOTE — PROGRESS NOTE ADULT - SUBJECTIVE AND OBJECTIVE BOX
HPI: 56 yr old female w  metastatic cervical  CA (treated w cisplatin and taxol and RT to pelvis) mets to bone (s/p emergency fixation for pathologic fx ), recent enlarged lobe of liver. S/P recent SBO discharged from  04-27 presented to  ED complaining of sudden onset SOB that has been progressive. Increasing pedal edema over the last few weeks. Had temp to 101 here and was unaware of fever.     5/1/17 - Dyspnea. Being transferred to tele.    5/2/17 - Feels better with treatment. No new events. HR is better. Less dyspnea    5/3/17 - Noted to be in respiratory distress. Tachypneic, uncomfortable. No chest pain.     PAST MEDICAL & SURGICAL HISTORY:  Omphalocele: hx of multiple abdominal surgeries  SBO (small bowel obstruction): 04-24 - 04-  Cervical ca  Spinal surgery in prior 3 months: August 2016  Encounter for central line placement: has mediport L ant chest  H/O hernia repair  Personal history of spine surgery: August 2016      PREVIOUS CARDIAC WORKUP:   Echo: LV normal size, contraction w LVEF 60-65%, tr MR, tr TR       CURRENT CARDIAC WORKUP:       Echo: 5/1/17   The left ventricle is normal in size. Function is good. Endocardium not always well seen.   Estimated left ventricular ejection fraction is 60-65 % assuming no regional wall motion abnormality.   The left atrium is mildly dilated.   The right atrium appears mildly dilated.   Right ventricle is enlarged in apical views with decreased function.   Normal aortic valve structure and function.   Trace mitral regurgitation is present.   Mild (1+) tricuspid valve regurgitation is present. Severe pulmonary hypertension.   Trace -1+ pulmonic valvular regurgitation is present.   Possible trace pericardial effusion not well defined.   No pleural comment made.   IVC not well seen    Allergies:   No Known Allergies      MEDICATIONS  (STANDING):  heparin  Injectable 5000Unit(s) SubCutaneous every 8 hours  sodium chloride 0.9% lock flush 3milliLiter(s) IV Push every 8 hours  docusate sodium 100milliGRAM(s) Oral three times a day  cefepime  IVPB 1000milliGRAM(s) IV Intermittent every 12 hours  metoprolol 25milliGRAM(s) Oral two times a day  azithromycin  IVPB  IV Intermittent   azithromycin  IVPB 500milliGRAM(s) IV Intermittent every 24 hours  methylPREDNISolone sodium succinate Injectable 40milliGRAM(s) IV Push two times a day  ALBUTerol/ipratropium for Nebulization 3milliLiter(s) Nebulizer every 6 hours  buDESOnide   0.5 milliGRAM(s) Respule 0.5milliGRAM(s) Inhalation two times a day  calcium carbonate 1250 mG + Vitamin D (OsCal 500 + D) 1Tablet(s) Oral three times a day  magnesium sulfate  IVPB 2Gram(s) IV Intermittent once  potassium phosphate / sodium phosphate tablet (K-PHOS NEUTRAL) 2Tablet(s) Oral once  ALPRAZolam 0.5milliGRAM(s) Oral once  amiodarone IVPB 150milliGRAM(s) IV Intermittent once  adenosine Injectable (ADENOCARD) 6milliGRAM(s) IV Push once  adenosine Injectable (ADENOCARD) 12milliGRAM(s) IV Push once    MEDICATIONS  (PRN):  calcium carbonate 500 mG (Tums) Chewable 3Tablet(s) Chew every 6 hours PRN Dyspepsia  zolpidem 5milliGRAM(s) Oral at bedtime PRN Insomnia  acetaminophen   Tablet 650milliGRAM(s) Oral every 6 hours PRN For Temp greater than 38 C (100.4 F)  LORazepam     Tablet 0.5milliGRAM(s) Oral three times a day PRN Anxiety  morphine  - Injectable 2milliGRAM(s) IV Push every 6 hours PRN Severe Pain (7 - 10)  ondansetron Injectable 4milliGRAM(s) IV Push every 6 hours PRN Nausea and/or Vomiting      ROS:     Detailed ten system ROS was performed and was negative except for history as eluded to above.    no fever  no chills  no nausea  no vomiting  no diarrhea  no constipation  no melena  no hematochezia  no chest pain  + palpitations  + sob  + dyspnea  no cough  no wheezing  no anorexia  no headache  no dizziness  no syncope  + weakness  no myalgia  no dysuria  no polyuria  no hematuria     Vital Signs Last 24 Hrs  T(C): 36.2, Max: 37.2 (05-02 @ 11:32)  T(F): 97.1, Max: 98.9 (05-02 @ 11:32)  HR: 111 (66 - 114)  BP: 94/53 (85/51 - 111/58)  BP(mean): --  RR: 19 (19 - 20)  SpO2: 95% (92% - 97%)    I&O's Summary    I & Os for current day (as of 03 May 2017 11:03)  =============================================  IN: 0 ml / OUT: 535 ml / NET: -535 ml      PHYSICAL EXAM:    General Appearance:    Uncomfortable, AAO X 3, in resp distress.   HEENT:                       Atraumatic, PERRLA, EOMI, conjunctiva clear.   Neck:                          Supple, no adenopathy, no thyromegaly, no JVD, no carotid bruit  Chest:                         B/L rhonchi, symmetric air entry, SpO2 96% on NRBM O2 100%  Heart:                          S1, S2 Tachycardic,  BPM AFlutter vs SVT - Better after IV adenosine. Now Sinus tachycardia.  Abdomen:                    Soft, non-tender, bowel sounds active. No palpable masses.   Extremities:                 no cyanosis, no edema, no joint swelling. Peripheral pulses normal  Skin:                           Skin color, texture normal. No rashes   Neurologic:                  Alert and oriented X3, cranial nerves intact, sensory and motor normal.      INTERPRETATION OF TELEMETRY:   SVT ---> Sinus tachy    ECG:   SVT. Sinus tachy, no ST T changes    LABS:                        10.0   15.6  )-----------( 71       ( 02 May 2017 05:37 )             29.6     05-02    131<L>  |  96  |  16  ----------------------------<  114<H>  3.6   |  19<L>  |  0.41<L>    Ca    5.7<LL>      02 May 2017 05:37  Phos  0.8     05-03  Mg     1.3     05-03 04-29 @ 14:15  Trop-I <0.015  CK     --  CK MB  --    04-29 @ 12:35  Trop-I <0.015  CK     --  CK MB  --    Pro BNP  2583 04-30 @ 14:14  Pro BNP  1966 04-29 @ 12:35

## 2017-05-03 NOTE — PROVIDER CONTACT NOTE (CRITICAL VALUE NOTIFICATION) - SITUATION
MD informed that patients calcium is 6.1, awaiting orders to be placed
notified Dr. Juliano Kim is who ordered test (PTH intact) called Dr. Kim's office at 3 PM awaiting call back

## 2017-05-03 NOTE — PROVIDER CONTACT NOTE (CRITICAL VALUE NOTIFICATION) - TEST AND RESULT REPORTED:
pH 7.15 pCO2 25 pO2 383 Bicarb 8 base excess -19
Lactate 5.2
Phos 0.8
calcium 5.9
calcium from PTH intact 6.1
calcium of 6.1
lactate 14.7

## 2017-05-03 NOTE — PROGRESS NOTE ADULT - SUBJECTIVE AND OBJECTIVE BOX
pt seen emergently on 3N at approx 10:15 am after request for assistance from Dr Tavares and Dr Torres.  Pt acute deterioration in respiratory function coupled with SVT rate 180-200/min.  On eval pt severely dyspneic despite supplemental O2.  Amiodarone 150mg IVP administered by Dr Torres.  Transferred immediately to CCU in prep for potential intubation.  Placed on 100% NRB mask. Aggressive treatment of SVT as this thought to be cause of abrupt respiratory decompensation.    after arrival in CCU pt rec'd Adenosine 6mg, then 12 mg - SVT broke to sinus tachycardia but pt then hypotensive - Given 500ml IVF bolus with pressure bag to improve infusion rate.  BP improved but pt again reverted to SVT with rate in 180's - additional adenosine administered with good result.  Lopressor 5mg IVP x 2 doses administered as well.  Pt Currently with HR 90's, Sat 100% on FM O2.  Pt feeling better overall.    Per records and discussion with pt, Dr Torres, Dr Tavares, pt is 56 yr old female suffering from metastatic cervical cancer s/p treatment w/ cisplatin and taxol and RT to pelvis.  Mets to bone present s/p emergency fixation for pathologic fx .  Pt also with recent enlarged lobe of liver , follicular hyperplasia w dysfunction,  S/P recent SBO discharged from  04-27 presented to  ED again on 4/29 with c/o SOB and pedal edema    In ED given IV fluids, cultured and treated with cefepime, and vanco.    ECHO  LV normal size, contraction w LVEF 60-65% tr MR, tr TR     Dr Tavares, Dr Torres and I all discussed current issues and plans with pts sister/father/and aunt.    STAT Add on BMP ordered and results.       PAST MEDICAL & SURGICAL HISTORY:  Omphalocele: hx of multiple abdominal surgeries  SBO (small bowel obstruction): 04-24 - 04-  Cervical ca  Spinal surgery in prior 3 months: August 2016  Encounter for central line placement: has mediport L ant chest  H/O hernia repair  Personal history of spine surgery: August 2016      FAMILY HISTORY:  No pertinent family history in first degree relatives      Social Hx:    Allergies    No Known Allergies    Intolerances        56y      Weight (kg): 79.8 (05-03 @ 11:10)    ICU Vital Signs Last 24 Hrs  T(C): 36.2, Max: 37.2 (05-02 @ 19:42)  T(F): 97.1, Max: 98.9 (05-02 @ 19:42)  HR: 111 (66 - 114)  BP: 94/53 (85/51 - 111/58)  BP(mean): --  ABP: --  ABP(mean): --  RR: 19 (19 - 19)  SpO2: 95% (92% - 97%)          I&O's Summary    I & Os for current day (as of 03 May 2017 11:53)  =============================================  IN: 0 ml / OUT: 535 ml / NET: -535 ml                            10.0   15.6  )-----------( 71       ( 02 May 2017 05:37 )             29.6       05-03    134<L>  |  99  |  17  ----------------------------<  113<H>  3.6   |  20<L>  |  0.51    Ca    5.9<LL>      03 May 2017 05:32  Phos  0.8     05-03  Mg     1.3     05-03        MEDICATIONS  (STANDING):  heparin  Injectable 5000Unit(s) SubCutaneous every 8 hours  sodium chloride 0.9% lock flush 3milliLiter(s) IV Push every 8 hours  docusate sodium 100milliGRAM(s) Oral three times a day  cefepime  IVPB 1000milliGRAM(s) IV Intermittent every 12 hours  azithromycin  IVPB  IV Intermittent   azithromycin  IVPB 500milliGRAM(s) IV Intermittent every 24 hours  methylPREDNISolone sodium succinate Injectable 40milliGRAM(s) IV Push two times a day  ALBUTerol/ipratropium for Nebulization 3milliLiter(s) Nebulizer every 6 hours  buDESOnide   0.5 milliGRAM(s) Respule 0.5milliGRAM(s) Inhalation two times a day  calcium carbonate 1250 mG + Vitamin D (OsCal 500 + D) 1Tablet(s) Oral three times a day  potassium phosphate / sodium phosphate tablet (K-PHOS NEUTRAL) 2Tablet(s) Oral once  ALPRAZolam 0.5milliGRAM(s) Oral once  amiodarone IVPB 150milliGRAM(s) IV Intermittent once  metoprolol 50milliGRAM(s) Oral two times a day  phenylephrine    Infusion 0.5MICROgram(s)/kG/Min IV Continuous <Continuous>  sodium phosphate IVPB 15milliMole(s) IV Intermittent once  calcitriol   Capsule 0.25MICROGram(s) Oral two times a day    MEDICATIONS  (PRN):  calcium carbonate 500 mG (Tums) Chewable 3Tablet(s) Chew every 6 hours PRN Dyspepsia  zolpidem 5milliGRAM(s) Oral at bedtime PRN Insomnia  acetaminophen   Tablet 650milliGRAM(s) Oral every 6 hours PRN For Temp greater than 38 C (100.4 F)  LORazepam     Tablet 0.5milliGRAM(s) Oral three times a day PRN Anxiety  morphine  - Injectable 2milliGRAM(s) IV Push every 6 hours PRN Severe Pain (7 - 10)  ondansetron Injectable 4milliGRAM(s) IV Push every 6 hours PRN Nausea and/or Vomiting          DVT Prophylaxis:    Advanced Directives:  Discussed with:    Visit Information:    ** Time is exclusive of billed procedures and/or teaching and/or routine family updates. pt seen emergently on 3N at approx 10:15 am after request for assistance from Dr Tavares and Dr Torres.  Pt acute deterioration in respiratory function coupled with SVT rate 180-200/min.  On eval pt severely dyspneic despite supplemental O2.  Amiodarone 150mg IVP administered by Dr Torres.  Transferred immediately to CCU in prep for potential intubation.  Placed on 100% NRB mask. Aggressive treatment of SVT as this thought to be cause of abrupt respiratory decompensation.    after arrival in CCU pt rec'd Adenosine 6mg, then 12 mg - SVT broke to sinus tachycardia but pt then hypotensive - Given 500ml IVF bolus with pressure bag to improve infusion rate.  BP improved but pt again reverted to SVT with rate in 180's - additional adenosine administered with good result.  Lopressor 5mg IVP x 2 doses administered as well.  Pt Currently with HR 90's, Sat 100% on FM O2.  Pt feeling better overall.    Per records and discussion with pt, Dr Torres, Dr Tavares, pt is 56 yr old female suffering from metastatic cervical cancer s/p treatment w/ cisplatin and taxol and RT to pelvis.  Mets to bone present s/p emergency fixation for pathologic fx .  Pt also with recent enlarged lobe of liver , follicular hyperplasia w dysfunction,  S/P recent SBO discharged from  04-27 presented to  ED again on 4/29 with c/o SOB and pedal edema    In ED given IV fluids, cultured and treated with cefepime, and vanco.    ECHO  LV normal size, contraction w LVEF 60-65% tr MR, tr TR     Dr Tavares, Dr Torres and I all discussed current issues and plans with pts sister/father/and aunt.    STAT Add on BMP ordered and results.       PAST MEDICAL & SURGICAL HISTORY:  Omphalocele: hx of multiple abdominal surgeries  SBO (small bowel obstruction): 04-24 - 04-  Cervical ca  Spinal surgery in prior 3 months: August 2016  Encounter for central line placement: has mediport L ant chest  H/O hernia repair  Personal history of spine surgery: August 2016      FAMILY HISTORY:  No pertinent family history in first degree relatives      Social Hx:    Allergies    No Known Allergies    Intolerances        56y      Weight (kg): 79.8 (05-03 @ 11:10)    ICU Vital Signs Last 24 Hrs  T(C): 36.2, Max: 37.2 (05-02 @ 19:42)  T(F): 97.1, Max: 98.9 (05-02 @ 19:42)  HR: 111 (66 - 114)  BP: 94/53 (85/51 - 111/58)  BP(mean): --  ABP: --  ABP(mean): --  RR: 19 (19 - 19)  SpO2: 95% (92% - 97%)          I&O's Summary    I & Os for current day (as of 03 May 2017 11:53)  =============================================  IN: 0 ml / OUT: 535 ml / NET: -535 ml                            10.0   15.6  )-----------( 71       ( 02 May 2017 05:37 )             29.6       05-03    134<L>  |  99  |  17  ----------------------------<  113<H>  3.6   |  20<L>  |  0.51    Ca    5.9<LL>      03 May 2017 05:32  Phos  0.8     05-03  Mg     1.3     05-03        MEDICATIONS  (STANDING):  heparin  Injectable 5000Unit(s) SubCutaneous every 8 hours  sodium chloride 0.9% lock flush 3milliLiter(s) IV Push every 8 hours  docusate sodium 100milliGRAM(s) Oral three times a day  cefepime  IVPB 1000milliGRAM(s) IV Intermittent every 12 hours  azithromycin  IVPB  IV Intermittent   azithromycin  IVPB 500milliGRAM(s) IV Intermittent every 24 hours  methylPREDNISolone sodium succinate Injectable 40milliGRAM(s) IV Push two times a day  ALBUTerol/ipratropium for Nebulization 3milliLiter(s) Nebulizer every 6 hours  buDESOnide   0.5 milliGRAM(s) Respule 0.5milliGRAM(s) Inhalation two times a day  calcium carbonate 1250 mG + Vitamin D (OsCal 500 + D) 1Tablet(s) Oral three times a day  potassium phosphate / sodium phosphate tablet (K-PHOS NEUTRAL) 2Tablet(s) Oral once  ALPRAZolam 0.5milliGRAM(s) Oral once  amiodarone IVPB 150milliGRAM(s) IV Intermittent once  metoprolol 50milliGRAM(s) Oral two times a day  phenylephrine    Infusion 0.5MICROgram(s)/kG/Min IV Continuous <Continuous>  sodium phosphate IVPB 15milliMole(s) IV Intermittent once  calcitriol   Capsule 0.25MICROGram(s) Oral two times a day    MEDICATIONS  (PRN):  calcium carbonate 500 mG (Tums) Chewable 3Tablet(s) Chew every 6 hours PRN Dyspepsia  zolpidem 5milliGRAM(s) Oral at bedtime PRN Insomnia  acetaminophen   Tablet 650milliGRAM(s) Oral every 6 hours PRN For Temp greater than 38 C (100.4 F)  LORazepam     Tablet 0.5milliGRAM(s) Oral three times a day PRN Anxiety  morphine  - Injectable 2milliGRAM(s) IV Push every 6 hours PRN Severe Pain (7 - 10)  ondansetron Injectable 4milliGRAM(s) IV Push every 6 hours PRN Nausea and/or Vomiting          DVT Prophylaxis: SQ heparin    Advanced Directives: FULL  Discussed with: patient    Visit Information: Critical care time 75 min.    ** Time is exclusive of billed procedures and/or teaching and/or routine family updates.

## 2017-05-03 NOTE — PROGRESS NOTE ADULT - ASSESSMENT
PROBLEMS:    SOB-secodnary to Pneumonia/scattered nodular and groundglass opacities in both lungs unchanged   from the prior study, likely infectious or inflammatory/lymphangitic spread  COPD/EMPHYSEMA Flare  Metastatic SCC CA to bone s/p spine fixation for pathologic fx   Recent SBO  Leg swelling    PLAN;    IV ABX-azithromycin -cefepime  Taper IV solumedrol 40mg q12hr  AEROSOL  SUPPORTIVE CARE  PULSE OXIMETRY  dvt PROPHYLASIX

## 2017-05-03 NOTE — PROGRESS NOTE ADULT - SUBJECTIVE AND OBJECTIVE BOX
Subjective:    pat better, less sob. lying comfortably.    MEDICATIONS  (STANDING):  heparin  Injectable 5000Unit(s) SubCutaneous every 8 hours  sodium chloride 0.9% lock flush 3milliLiter(s) IV Push every 8 hours  docusate sodium 100milliGRAM(s) Oral three times a day  cefepime  IVPB 1000milliGRAM(s) IV Intermittent every 12 hours  metoprolol 25milliGRAM(s) Oral two times a day  azithromycin  IVPB  IV Intermittent   azithromycin  IVPB 500milliGRAM(s) IV Intermittent every 24 hours  methylPREDNISolone sodium succinate Injectable 40milliGRAM(s) IV Push two times a day  ALBUTerol/ipratropium for Nebulization 3milliLiter(s) Nebulizer every 6 hours  buDESOnide   0.5 milliGRAM(s) Respule 0.5milliGRAM(s) Inhalation two times a day  calcium carbonate 1250 mG + Vitamin D (OsCal 500 + D) 1Tablet(s) Oral three times a day  magnesium sulfate  IVPB 2Gram(s) IV Intermittent once  potassium phosphate / sodium phosphate tablet (K-PHOS NEUTRAL) 2Tablet(s) Oral once    MEDICATIONS  (PRN):  calcium carbonate 500 mG (Tums) Chewable 3Tablet(s) Chew every 6 hours PRN Dyspepsia  zolpidem 5milliGRAM(s) Oral at bedtime PRN Insomnia  acetaminophen   Tablet 650milliGRAM(s) Oral every 6 hours PRN For Temp greater than 38 C (100.4 F)  LORazepam     Tablet 0.5milliGRAM(s) Oral three times a day PRN Anxiety  morphine  - Injectable 2milliGRAM(s) IV Push every 6 hours PRN Severe Pain (7 - 10)  ondansetron Injectable 4milliGRAM(s) IV Push every 6 hours PRN Nausea and/or Vomiting      Allergies    No Known Allergies    Intolerances        Vital Signs Last 24 Hrs  T(C): 36.7, Max: 37.2 (05-02 @ 11:32)  T(F): 98.1, Max: 98.9 (05-02 @ 11:32)  HR: 109 (66 - 114)  BP: 111/58 (85/51 - 111/58)  BP(mean): --  RR: 20 (20 - 20)  SpO2: 92% (92% - 97%)    PHYSICAL EXAMINATION:    NECK:  Supple. No lymphadenopathy. Jugular venous pressure not elevated. Carotids equal.   HEART:   The cardiac impulse has a normal quality. Reg., Nl S1 and S2.  There are no murmurs, rubs or gallops noted  CHEST:  Chest rhonchi to auscultation. Normal respiratory effort.  ABDOMEN:  Soft and nontender.   EXTREMITIES:  There is no edema.       LABS:                        10.0   15.6  )-----------( 71       ( 02 May 2017 05:37 )             29.6     05-02    131<L>  |  96  |  16  ----------------------------<  114<H>  3.6   |  19<L>  |  0.41<L>    Ca    5.7<LL>      02 May 2017 05:37  Phos  0.8     05-03  Mg     1.3     05-03

## 2017-05-04 LAB
CULTURE RESULTS: SIGNIFICANT CHANGE UP
CULTURE RESULTS: SIGNIFICANT CHANGE UP
PTH-INTACT FLD-MCNC: 271 PG/ML — HIGH (ref 15–65)
SPECIMEN SOURCE: SIGNIFICANT CHANGE UP
SPECIMEN SOURCE: SIGNIFICANT CHANGE UP

## 2017-05-09 DIAGNOSIS — R65.21 SEVERE SEPSIS WITH SEPTIC SHOCK: ICD-10-CM

## 2017-05-09 DIAGNOSIS — C79.51 SECONDARY MALIGNANT NEOPLASM OF BONE: ICD-10-CM

## 2017-05-09 DIAGNOSIS — E88.09 OTHER DISORDERS OF PLASMA-PROTEIN METABOLISM, NOT ELSEWHERE CLASSIFIED: ICD-10-CM

## 2017-05-09 DIAGNOSIS — R16.0 HEPATOMEGALY, NOT ELSEWHERE CLASSIFIED: ICD-10-CM

## 2017-05-09 DIAGNOSIS — M19.90 UNSPECIFIED OSTEOARTHRITIS, UNSPECIFIED SITE: ICD-10-CM

## 2017-05-09 DIAGNOSIS — E87.6 HYPOKALEMIA: ICD-10-CM

## 2017-05-09 DIAGNOSIS — I47.1 SUPRAVENTRICULAR TACHYCARDIA: ICD-10-CM

## 2017-05-09 DIAGNOSIS — J15.6 PNEUMONIA DUE TO OTHER GRAM-NEGATIVE BACTERIA: ICD-10-CM

## 2017-05-09 DIAGNOSIS — J44.1 CHRONIC OBSTRUCTIVE PULMONARY DISEASE WITH (ACUTE) EXACERBATION: ICD-10-CM

## 2017-05-09 DIAGNOSIS — E87.1 HYPO-OSMOLALITY AND HYPONATREMIA: ICD-10-CM

## 2017-05-09 DIAGNOSIS — D69.6 THROMBOCYTOPENIA, UNSPECIFIED: ICD-10-CM

## 2017-05-09 DIAGNOSIS — F41.9 ANXIETY DISORDER, UNSPECIFIED: ICD-10-CM

## 2017-05-09 DIAGNOSIS — J96.01 ACUTE RESPIRATORY FAILURE WITH HYPOXIA: ICD-10-CM

## 2017-05-09 DIAGNOSIS — Z66 DO NOT RESUSCITATE: ICD-10-CM

## 2017-05-09 DIAGNOSIS — Z98.890 OTHER SPECIFIED POSTPROCEDURAL STATES: ICD-10-CM

## 2017-05-09 DIAGNOSIS — E87.2 ACIDOSIS: ICD-10-CM

## 2017-05-09 DIAGNOSIS — Z87.891 PERSONAL HISTORY OF NICOTINE DEPENDENCE: ICD-10-CM

## 2017-05-09 DIAGNOSIS — E83.42 HYPOMAGNESEMIA: ICD-10-CM

## 2017-05-09 DIAGNOSIS — A41.9 SEPSIS, UNSPECIFIED ORGANISM: ICD-10-CM

## 2017-05-09 DIAGNOSIS — E83.39 OTHER DISORDERS OF PHOSPHORUS METABOLISM: ICD-10-CM

## 2017-05-09 DIAGNOSIS — Z87.19 PERSONAL HISTORY OF OTHER DISEASES OF THE DIGESTIVE SYSTEM: ICD-10-CM

## 2017-05-09 DIAGNOSIS — C53.9 MALIGNANT NEOPLASM OF CERVIX UTERI, UNSPECIFIED: ICD-10-CM

## 2017-05-09 DIAGNOSIS — R09.02 HYPOXEMIA: ICD-10-CM

## 2017-05-09 DIAGNOSIS — I95.9 HYPOTENSION, UNSPECIFIED: ICD-10-CM

## 2017-05-09 DIAGNOSIS — R60.0 LOCALIZED EDEMA: ICD-10-CM

## 2017-05-09 DIAGNOSIS — E83.51 HYPOCALCEMIA: ICD-10-CM

## 2017-05-12 ENCOUNTER — APPOINTMENT (OUTPATIENT)
Dept: ORTHOPEDIC SURGERY | Facility: HOSPITAL | Age: 57
End: 2017-05-12

## 2017-05-31 NOTE — PROGRESS NOTE ADULT - EYES
Pt had episode of fecal incontinence x 2. Assisted to shower. Diaper on. VSS.. Call light within reach. Instructed to call for assisit to get on BSC.   detailed exam EOMI/PERRL

## 2017-08-03 ENCOUNTER — APPOINTMENT (OUTPATIENT)
Dept: GYNECOLOGIC ONCOLOGY | Facility: CLINIC | Age: 57
End: 2017-08-03

## 2018-08-30 NOTE — PATIENT PROFILE ADULT. - NS PRO OT REFERRAL QUES 2 YN
Patient had INR drawn today in coag clinic. Past INR and dosing reviewed with patient. INR today sub therapeutic. Patient denies any increased bruising/bleeding at this time. Patient admits to a couple missed doses this past week while on vacation. Will continue current warfarin dosing and recheck INR in 10 days. Patient denies any changes to meds or health. Reports vitamin K intake has been consistent. Patient reminded to call with any changes. Warfarin dosed per protocol. On site provider today Adal[facility] DAUGHERTY.    
no

## 2024-01-09 NOTE — ED ADULT NURSE NOTE - THOUGHTS OF HOMICIDE/VIOLENCE TOWARDS OTHERS YN, MLM
No
DO NOT take any Tylenol (Acetaminophen) or narcotics containing Tylenol until after  10:00pm . You received Tylenol during your operation and it can cause damage to your liver if too much is taken within a 24 hour time period.

## 2024-03-29 NOTE — PROGRESS NOTE ADULT - PROBLEM SELECTOR PROBLEM 3
HR=82 bpm, XGIE=090/64 mmhg, SpO2=97.0 %, Resp=13 B/min, EtCO2=43 mmHg, Apnea=2 Seconds, Pain=0, Rik=10, [place]=2 Hepatomegaly

## 2024-12-30 NOTE — ED STATDOCS - CHIEF COMPLAINT
Medication: Wellbutrin  Last office visit date: 11/29/24  Has follow up scheduled  Medication Refill Protocol Failed.  Protocol approved due to: identified sig mis match, same prescription.   
The patient is a 56y year old Female complaining of abdominal pain.

## 2025-07-21 NOTE — PROGRESS NOTE ADULT - PROBLEM/PLAN-2
Heather Delaney needs refill of   Requested Prescriptions     Pending Prescriptions Disp Refills    topiramate (TOPAMAX) 50 MG tablet [Pharmacy Med Name: Topiramate 50 MG Oral Tablet] 180 tablet 0     Sig: Take 1 tablet by mouth twice daily       Last Filled on: 1/29/2025 #180 R-1 sent to [facility] in [facility] by Amanda Tompkins DO.      Last Visit Date:  1/29/2025    Next Visit Date:  7/29/2025   
DISPLAY PLAN FREE TEXT